# Patient Record
Sex: FEMALE | Race: WHITE | NOT HISPANIC OR LATINO | Employment: OTHER | URBAN - METROPOLITAN AREA
[De-identification: names, ages, dates, MRNs, and addresses within clinical notes are randomized per-mention and may not be internally consistent; named-entity substitution may affect disease eponyms.]

---

## 2017-02-01 RX ORDER — LEVOCETIRIZINE DIHYDROCHLORIDE 5 MG/1
TABLET, FILM COATED ORAL
Qty: 90 TAB | Refills: 3 | Status: SHIPPED | OUTPATIENT
Start: 2017-02-01 | End: 2017-11-14

## 2017-02-09 DIAGNOSIS — R60.9 EDEMA, UNSPECIFIED TYPE: ICD-10-CM

## 2017-02-09 RX ORDER — FUROSEMIDE 20 MG/1
10 TABLET ORAL DAILY
Qty: 45 TAB | Refills: 3 | Status: CANCELLED | OUTPATIENT
Start: 2017-02-09

## 2017-02-09 RX ORDER — FUROSEMIDE 20 MG/1
10 TABLET ORAL DAILY
Qty: 45 TAB | Refills: 3 | OUTPATIENT
Start: 2017-02-09 | End: 2018-04-02 | Stop reason: SDUPTHER

## 2017-04-11 ENCOUNTER — HOSPITAL ENCOUNTER (OUTPATIENT)
Dept: LAB | Facility: MEDICAL CENTER | Age: 77
End: 2017-04-11
Attending: NURSE PRACTITIONER
Payer: MEDICARE

## 2017-04-11 DIAGNOSIS — I10 ESSENTIAL HYPERTENSION: ICD-10-CM

## 2017-04-11 DIAGNOSIS — Z00.00 ROUTINE HEALTH MAINTENANCE: ICD-10-CM

## 2017-04-11 DIAGNOSIS — I25.119 CORONARY ARTERY DISEASE INVOLVING NATIVE CORONARY ARTERY OF NATIVE HEART WITH ANGINA PECTORIS (HCC): ICD-10-CM

## 2017-04-11 DIAGNOSIS — R79.89 ELEVATED TSH: ICD-10-CM

## 2017-04-11 LAB
25(OH)D3 SERPL-MCNC: 31 NG/ML (ref 30–100)
ALBUMIN SERPL BCP-MCNC: 4.3 G/DL (ref 3.2–4.9)
ALBUMIN/GLOB SERPL: 1.7 G/DL
ALP SERPL-CCNC: 51 U/L (ref 30–99)
ALT SERPL-CCNC: 16 U/L (ref 2–50)
ANION GAP SERPL CALC-SCNC: 6 MMOL/L (ref 0–11.9)
AST SERPL-CCNC: 20 U/L (ref 12–45)
BASOPHILS # BLD AUTO: 0.6 % (ref 0–1.8)
BASOPHILS # BLD: 0.05 K/UL (ref 0–0.12)
BILIRUB SERPL-MCNC: 0.5 MG/DL (ref 0.1–1.5)
BUN SERPL-MCNC: 28 MG/DL (ref 8–22)
CALCIUM SERPL-MCNC: 9.9 MG/DL (ref 8.5–10.5)
CHLORIDE SERPL-SCNC: 106 MMOL/L (ref 96–112)
CHOLEST SERPL-MCNC: 176 MG/DL (ref 100–199)
CO2 SERPL-SCNC: 27 MMOL/L (ref 20–33)
CREAT SERPL-MCNC: 1.18 MG/DL (ref 0.5–1.4)
EOSINOPHIL # BLD AUTO: 0.19 K/UL (ref 0–0.51)
EOSINOPHIL NFR BLD: 2.4 % (ref 0–6.9)
ERYTHROCYTE [DISTWIDTH] IN BLOOD BY AUTOMATED COUNT: 50.4 FL (ref 35.9–50)
GFR SERPL CREATININE-BSD FRML MDRD: 44 ML/MIN/1.73 M 2
GLOBULIN SER CALC-MCNC: 2.5 G/DL (ref 1.9–3.5)
GLUCOSE SERPL-MCNC: 104 MG/DL (ref 65–99)
HCT VFR BLD AUTO: 38.8 % (ref 37–47)
HDLC SERPL-MCNC: 79 MG/DL
HGB BLD-MCNC: 12.3 G/DL (ref 12–16)
IMM GRANULOCYTES # BLD AUTO: 0.03 K/UL (ref 0–0.11)
IMM GRANULOCYTES NFR BLD AUTO: 0.4 % (ref 0–0.9)
LDLC SERPL CALC-MCNC: 77 MG/DL
LYMPHOCYTES # BLD AUTO: 2.54 K/UL (ref 1–4.8)
LYMPHOCYTES NFR BLD: 31.9 % (ref 22–41)
MCH RBC QN AUTO: 29.1 PG (ref 27–33)
MCHC RBC AUTO-ENTMCNC: 31.7 G/DL (ref 33.6–35)
MCV RBC AUTO: 91.7 FL (ref 81.4–97.8)
MONOCYTES # BLD AUTO: 0.76 K/UL (ref 0–0.85)
MONOCYTES NFR BLD AUTO: 9.5 % (ref 0–13.4)
NEUTROPHILS # BLD AUTO: 4.4 K/UL (ref 2–7.15)
NEUTROPHILS NFR BLD: 55.2 % (ref 44–72)
NRBC # BLD AUTO: 0 K/UL
NRBC BLD AUTO-RTO: 0 /100 WBC
PLATELET # BLD AUTO: 190 K/UL (ref 164–446)
PMV BLD AUTO: 11.6 FL (ref 9–12.9)
POTASSIUM SERPL-SCNC: 5.1 MMOL/L (ref 3.6–5.5)
PROT SERPL-MCNC: 6.8 G/DL (ref 6–8.2)
RBC # BLD AUTO: 4.23 M/UL (ref 4.2–5.4)
SODIUM SERPL-SCNC: 139 MMOL/L (ref 135–145)
TRIGL SERPL-MCNC: 99 MG/DL (ref 0–149)
TSH SERPL DL<=0.005 MIU/L-ACNC: 3.34 UIU/ML (ref 0.3–3.7)
WBC # BLD AUTO: 8 K/UL (ref 4.8–10.8)

## 2017-04-11 PROCEDURE — 80053 COMPREHEN METABOLIC PANEL: CPT

## 2017-04-11 PROCEDURE — 84443 ASSAY THYROID STIM HORMONE: CPT

## 2017-04-11 PROCEDURE — 82306 VITAMIN D 25 HYDROXY: CPT | Mod: GZ

## 2017-04-11 PROCEDURE — 36415 COLL VENOUS BLD VENIPUNCTURE: CPT

## 2017-04-11 PROCEDURE — 80061 LIPID PANEL: CPT

## 2017-04-11 PROCEDURE — 85025 COMPLETE CBC W/AUTO DIFF WBC: CPT

## 2017-04-18 ENCOUNTER — OFFICE VISIT (OUTPATIENT)
Dept: MEDICAL GROUP | Facility: PHYSICIAN GROUP | Age: 77
End: 2017-04-18
Payer: MEDICARE

## 2017-04-18 VITALS
RESPIRATION RATE: 12 BRPM | HEART RATE: 47 BPM | TEMPERATURE: 96.1 F | WEIGHT: 160 LBS | BODY MASS INDEX: 31.41 KG/M2 | SYSTOLIC BLOOD PRESSURE: 156 MMHG | DIASTOLIC BLOOD PRESSURE: 70 MMHG | OXYGEN SATURATION: 94 % | HEIGHT: 60 IN

## 2017-04-18 DIAGNOSIS — Z91.81 AT RISK FOR FALLS: ICD-10-CM

## 2017-04-18 DIAGNOSIS — R00.1 BRADYCARDIA WITH 41-50 BEATS PER MINUTE: ICD-10-CM

## 2017-04-18 DIAGNOSIS — Z59.9 FINANCIAL DIFFICULTIES: ICD-10-CM

## 2017-04-18 DIAGNOSIS — E66.9 OBESITY (BMI 30.0-34.9): ICD-10-CM

## 2017-04-18 DIAGNOSIS — R79.89 ELEVATED TSH: ICD-10-CM

## 2017-04-18 DIAGNOSIS — E78.5 DYSLIPIDEMIA: ICD-10-CM

## 2017-04-18 DIAGNOSIS — R73.01 ELEVATED FASTING GLUCOSE: ICD-10-CM

## 2017-04-18 DIAGNOSIS — I10 ESSENTIAL HYPERTENSION: ICD-10-CM

## 2017-04-18 LAB
HBA1C MFR BLD: 6 % (ref ?–5.8)
INT CON NEG: ABNORMAL
INT CON POS: ABNORMAL

## 2017-04-18 PROCEDURE — 83036 HEMOGLOBIN GLYCOSYLATED A1C: CPT | Performed by: NURSE PRACTITIONER

## 2017-04-18 PROCEDURE — 4040F PNEUMOC VAC/ADMIN/RCVD: CPT | Performed by: NURSE PRACTITIONER

## 2017-04-18 PROCEDURE — 99214 OFFICE O/P EST MOD 30 MIN: CPT | Performed by: NURSE PRACTITIONER

## 2017-04-18 PROCEDURE — G8419 CALC BMI OUT NRM PARAM NOF/U: HCPCS | Performed by: NURSE PRACTITIONER

## 2017-04-18 PROCEDURE — 3288F FALL RISK ASSESSMENT DOCD: CPT | Mod: 8P | Performed by: NURSE PRACTITIONER

## 2017-04-18 PROCEDURE — 1036F TOBACCO NON-USER: CPT | Performed by: NURSE PRACTITIONER

## 2017-04-18 PROCEDURE — G8432 DEP SCR NOT DOC, RNG: HCPCS | Performed by: NURSE PRACTITIONER

## 2017-04-18 PROCEDURE — 0518F FALL PLAN OF CARE DOCD: CPT | Mod: 8P | Performed by: NURSE PRACTITIONER

## 2017-04-18 PROCEDURE — G8599 NO ASA/ANTIPLAT THER USE RNG: HCPCS | Performed by: NURSE PRACTITIONER

## 2017-04-18 PROCEDURE — 1100F PTFALLS ASSESS-DOCD GE2>/YR: CPT | Performed by: NURSE PRACTITIONER

## 2017-04-18 RX ORDER — LISINOPRIL 20 MG/1
20 TABLET ORAL
Refills: 3 | COMMUNITY
Start: 2017-03-08 | End: 2018-03-30

## 2017-04-18 SDOH — ECONOMIC STABILITY - INCOME SECURITY: PROBLEM RELATED TO HOUSING AND ECONOMIC CIRCUMSTANCES, UNSPECIFIED: Z59.9

## 2017-04-18 ASSESSMENT — PATIENT HEALTH QUESTIONNAIRE - PHQ9: CLINICAL INTERPRETATION OF PHQ2 SCORE: 0

## 2017-04-18 NOTE — ASSESSMENT & PLAN NOTE
Reports she has not been able to walk much this winter d/t feeling tired and the weather. She does not have a kitchen, therefore is difficult to have meals. I have contacted  for assistance already for nutrition help, but meals on wheels was not an option.

## 2017-04-18 NOTE — ASSESSMENT & PLAN NOTE
Chronic problem well controlled with high intensity atorvastatin 80 mg daily without myalgias.     Ref. Range 4/14/2016 10:54 4/11/2017 11:30   Cholesterol,Tot Latest Ref Range: 100-199 mg/dL 154 176   Triglycerides Latest Ref Range: 0-149 mg/dL 71 99   HDL Latest Ref Range: >=40 mg/dL 75 79   LDL Latest Ref Range: <100 mg/dL 65 77

## 2017-04-18 NOTE — MR AVS SNAPSHOT
Valorie Sierra   2017 1:00 PM   Office Visit   MRN: [id number]    Department:  [facility]   Dept Phone:  [phone]    Description:  Female : 1940   Provider:  HAFSA Najera           Reason for Visit     Follow-Up 6 months      Allergies as of 2017     Allergen Noted Reactions    Iodine 2014   Itching    Oxycodone-Acetaminophen 2014   Hives    Sulfa Drugs 2014   Vomiting      You were diagnosed with     Essential hypertension   [1186134]       Elevated fasting glucose   [506708]       Dyslipidemia   [201854]       Elevated TSH   [637318]       Risk for falls   [194465]       At risk for falls   [860452]       Obesity (BMI 30.0-34.9)   [833912]       Financial difficulties   [145214]         Vital Signs     Blood Pressure Pulse Temperature Respirations Height Weight    156/70 mmHg 47 35.6 °C (96.1 °F) 12 1.524 m (5') 72.576 kg (160 lb)    Body Mass Index Oxygen Saturation Smoking Status             31.25 kg/m2 94% Former Smoker         Basic Information     Date Of Birth Sex Race Ethnicity Preferred Language    1940 Female White Non- English      Your appointments     2017  1:00 PM   FOLLOW UP with Kyle Stanley M.D.   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   2014 - Present    Compression fracture of vertebrae (CMS-HCC) M48.50XA   2014 - Present    Coronary artery disease involving native coronary artery of native heart with angina pectoris (CMS-HCC) I25.119   2016 - Present    Edema R60.9   2016 -  Present    Obesity (BMI 30.0-34.9) E66.9   10/18/2016 - Present    Elevated TSH R94.6   10/18/2016 - Present    Osteopenia M85.80   11/1/2016 - Present    Risk for falls Z91.81   4/18/2017 - Present    At risk for falls Z91.81   4/18/2017 - Present    Financial difficulties Z59.8   4/18/2017 - Present      Health Maintenance        Date Due Completion Dates    BONE DENSITY 9/22/2018 9/22/2016 (Done)    Override on 9/22/2016: Done    IMM DTaP/Tdap/Td Vaccine (2 - Td) 8/23/2022 8/23/2012            Current Immunizations     13-VALENT PCV PREVNAR 9/9/2015    Influenza TIV (IM) 9/1/2013    Influenza Vaccine Quad Inj (Preserved) 9/3/2016    Pneumococcal polysaccharide vaccine (PPSV-23) 8/31/2005    SHINGLES VACCINE 8/23/2012    Tdap Vaccine 8/23/2012      Below and/or attached are the medications your provider expects you to take. Review all of your home medications and newly ordered medications with your provider and/or pharmacist. Follow medication instructions as directed by your provider and/or pharmacist. Please keep your medication list with you and share with your provider. Update the information when medications are discontinued, doses are changed, or new medications (including over-the-counter products) are added; and carry medication information at all times in the event of emergency situations     Allergies:  IODINE - Itching     OXYCODONE-ACETAMINOPHEN - Hives     SULFA DRUGS - Vomiting               Medications  Valid as of: April 18, 2017 -  1:38 PM    Generic Name Brand Name Tablet Size Instructions for use    Aspirin (Tablet Delayed Response) ECOTRIN 81 MG Take 1 Tab by mouth every day.        Atorvastatin Calcium (Tab) LIPITOR 80 MG Take 1 Tab by mouth every day. TAKE 1 TAB BY MOUTH EVERY DAY.        Budesonide-Formoterol Fumarate (Aerosol) SYMBICORT 80-4.5 MCG/ACT Inhale 2 Puffs by mouth 2 Times a Day.        Calcium Carbonate-Vit D-Min   Take  by mouth.        Cholecalciferol (Tab) cholecalciferol 1000  UNIT Take 1,000 Units by mouth every day.        Furosemide (Tab) LASIX 20 MG Take 0.5 Tabs by mouth every day.        Lactobacillus Rhamnosus (GG)   Take  by mouth every day.        Levocetirizine Dihydrochloride (Tab) Levocetirizine Dihydrochloride 5 MG TAKE 1 TAB BY MOUTH EVERY BEDTIME.        Lisinopril (Tab) PRINIVIL 20 MG 20 mg every day.        Metoprolol Tartrate (Tab) LOPRESSOR 25 MG Take 0.5 Tabs by mouth 2 Times a Day.        Potassium Chloride Luz CR (Tab CR) Kdur 20 MEQ Take 1 Tab by mouth every day.        .                 Medicines prescribed today were sent to:     [facility]Y #[id number] - [place], [place] - [address] [facility] AT [address] [place] [address]    Phone: [phone] Fax: [phone]    Open 24 Hours?: No      Medication refill instructions:       If your prescription bottle indicates you have medication refills left, it is not necessary to call your provider’s office. Please contact your pharmacy and they will refill your medication.    If your prescription bottle indicates you do not have any refills left, you may request refills at any time through one of the following ways: The online [employer] system (except Urgent Care), by calling your provider’s office, or by asking your pharmacy to contact your provider’s office with a refill request. Medication refills are processed only during regular business hours and may not be available until the next business day. Your provider may request additional information or to have a follow-up visit with you prior to refilling your medication.   *Please Note: Medication refills are assigned a new Rx number when refilled electronically. Your pharmacy may indicate that no refills were authorized even though a new prescription for the same medication is available at the pharmacy. Please request the medicine by name with the pharmacy before contacting your provider for a refill.        Other Notes About Your Plan     SIGNED  OA:   10/09/2014  LAST UDS: 11/11/2014             MyChart Status: Patient Declined

## 2017-04-18 NOTE — ASSESSMENT & PLAN NOTE
Established problem currently managed with lisinopril 20 mg and metoprolol 25 mg BID. Does monitor bp at home and states it is frequently in 150s. Lisinopril was recently increased. Denies any lightheadedness, headache, vision changes, or LE edema.

## 2017-04-18 NOTE — ASSESSMENT & PLAN NOTE
New problem. Denies any polyuria, polydipsia, polyphagia, blurred or cloudy vision, rash or thrush, or numbness or tingling of feet. Does not exercise regularly and does not have good diet intake because she relies on eating out and eating very cheap food   Ref. Range 4/18/2017 13:40   Glycohemoglobin Unknown 6.0

## 2017-04-18 NOTE — ASSESSMENT & PLAN NOTE
Established problem that has resolved.     Ref. Range 4/14/2016 10:54 4/11/2017 11:30   TSH Latest Ref Range: 0.300-3.700 uIU/mL 4.340 (H) 3.340

## 2017-04-19 ENCOUNTER — TELEPHONE (OUTPATIENT)
Dept: MEDICAL GROUP | Facility: PHYSICIAN GROUP | Age: 77
End: 2017-04-19

## 2017-04-19 NOTE — ASSESSMENT & PLAN NOTE
Patient has been having a lot of financial difficulties, especially now that her rent has been increased with her fixed income. She has hardly been in touch with the  to look for other option, but they were not really able to address anything that worked for the patient

## 2017-04-19 NOTE — TELEPHONE ENCOUNTER
Please call patient to ask her if [facility] is able to bring her to her May appointment with myself. If not, we need to discuss with Susan and/or aPrul about getting a taxi voucher as I really need patient to get back to clinic for follow up. Thank you.    LUCIE Najera.

## 2017-04-19 NOTE — TELEPHONE ENCOUNTER
----- Message from HAFSA Najera sent at 4/18/2017  1:26 PM PDT -----  Check if can get ride or if need taxi voucher

## 2017-04-19 NOTE — PROGRESS NOTES
Subjective:     Chief Complaint   Patient presents with   • Follow-Up     6 months       HPI  Valorie Sierra is a 76 y.o. female here today for routine follow up.     Dyslipidemia  Chronic problem well controlled with high intensity atorvastatin 80 mg daily without myalgias.     Ref. Range 4/14/2016 10:54 4/11/2017 11:30   Cholesterol,Tot Latest Ref Range: 100-199 mg/dL 154 176   Triglycerides Latest Ref Range: 0-149 mg/dL 71 99   HDL Latest Ref Range: >=40 mg/dL 75 79   LDL Latest Ref Range: <100 mg/dL 65 77       Elevated TSH  Established problem that has resolved.     Ref. Range 4/14/2016 10:54 4/11/2017 11:30   TSH Latest Ref Range: 0.300-3.700 uIU/mL 4.340 (H) 3.340       Essential hypertension  Established problem currently managed with lisinopril 20 mg and metoprolol 25 mg BID. Does monitor bp at home and states it is frequently in 150s. Lisinopril was recently increased. Denies any lightheadedness, headache, vision changes, or LE edema.     Obesity (BMI 30.0-34.9)  Reports she has not been able to walk much this winter d/t feeling tired and the weather. She does not have a kitchen, therefore is difficult to have meals. I have contacted  for assistance already for nutrition help, but meals on wheels was not an option.     Elevated fasting glucose  New problem. Denies any polyuria, polydipsia, polyphagia, blurred or cloudy vision, rash or thrush, or numbness or tingling of feet. Does not exercise regularly and does not have good diet intake because she relies on eating out and eating very cheap food   Ref. Range 4/18/2017 13:40   Glycohemoglobin Unknown 6.0       Risk for falls  Patient lives in a hotel. She does have a walker for assistive device. She has generalized weakness    Financial difficulties  Patient has been having a lot of financial difficulties, especially now that her rent has been increased with her fixed income. She has hardly been in touch with the  to look for  other option, but they were not really able to address anything that worked for the patient         Diagnoses of Essential hypertension, Bradycardia with 41-50 beats per minute, Dyslipidemia, Elevated fasting glucose, Elevated TSH, At risk for falls, Financial difficulties, and Obesity (BMI 30.0-34.9) were pertinent to this visit.    Allergies: Iodine; Oxycodone-acetaminophen; and Sulfa drugs  Current medicines (including changes today)  Current Outpatient Prescriptions   Medication Sig Dispense Refill   • lisinopril (PRINIVIL) 20 MG Tab 20 mg every day.  3   • metoprolol (LOPRESSOR) 25 MG Tab Take 0.5 Tabs by mouth 2 Times a Day. 60 Tab 11   • furosemide (LASIX) 20 MG Tab Take 0.5 Tabs by mouth every day. 45 Tab 3   • Levocetirizine Dihydrochloride 5 MG Tab TAKE 1 TAB BY MOUTH EVERY BEDTIME. 90 Tab 3   • atorvastatin (LIPITOR) 80 MG tablet Take 1 Tab by mouth every day. TAKE 1 TAB BY MOUTH EVERY DAY. 90 Tab 3   • vitamin D (CHOLECALCIFEROL) 1000 UNIT Tab Take 1,000 Units by mouth every day.     • Calcium Carbonate-Vit D-Min (CALCIUM 1200 PO) Take  by mouth.     • budesonide-formoterol (SYMBICORT) 80-4.5 MCG/ACT Aerosol Inhale 2 Puffs by mouth 2 Times a Day. 1 Inhaler 11   • potassium chloride SA (K-DUR) 20 MEQ Tab CR Take 1 Tab by mouth every day. 90 Tab 3   • Lactobacillus Rhamnosus, GG, (CULTURELLE PO) Take  by mouth every day.     • aspirin EC (ECOTRIN) 81 MG TBEC Take 1 Tab by mouth every day. 90 Tab 3     No current facility-administered medications for this visit.       She  has a past medical history of Anemia; Anxiety; Depression; Arrhythmia; Arthritis; Blood transfusion, without reported diagnosis; Unspecified cataract; CHF (congestive heart failure) (CMS-[facility]); Emphysema; Chronic airway obstruction, not elsewhere classified (CMS-[facility]); GERD (gastroesophageal reflux disease); Headache, classical migraine; Heart attack (CMS-[facility]); Heart murmur; Hyperlipidemia; Hypertension; IBD (inflammatory bowel disease);  Kidney disease; and Ulcer (CMS-HCC).    Health Maintenance: UTD    ROS  As stated in HPI and additionally  General: +fatigue. No fevers, chills, weight loss  CV: Denies any chest pain, palpitations,  THOMAS, syncope, pre-syncope, or LE edema.   Pulm: No sob or dyspnea       Objective:     Blood pressure 156/70, pulse 47, temperature 35.6 °C (96.1 °F), resp. rate 12, height 1.524 m (5'), weight 72.576 kg (160 lb), SpO2 94 %. Body mass index is 31.25 kg/(m^2).  Physical Exam:  General: Alert, oriented, in no acute distress.  Eye contact is good, speech goal directed, affect calm and pleasant   HEENT: conjunctiva non-injected, sclera non-icteric, EOMs intact.  Oral mucous membranes pink and moist with no lesions. Oropharynx without erythema, or exudate.   Neck: No adenopathy or masses in the cervical or supraclavicular regions.   Lungs: unlabored. clear to auscultation bilaterally with good excursion.  CV: bradycardic rate and rhythm. No murmurs  Ext: no edema, normal color and temperature.   Gait steady with walker.         Assessment and Plan:   Assessment/Plan:  1. Essential hypertension  bp slightly elevated today. We will first address bradycardia and re-eval blood pressure in 3 weeks as I suspect we will need to add additional medication   - metoprolol (LOPRESSOR) 25 MG Tab; Take 0.5 Tabs by mouth 2 Times a Day.  Dispense: 60 Tab; Refill: 11    2. Bradycardia with 41-50 beats per minute  Decrease metoprolol from 25 mg BID to 12.5 mg BID for 3 weeks then re-eval     3. Dyslipidemia  controlled    4. Elevated fasting glucose  New problem. Discussion on education will be deferred to next visit in 3 weeks  - POCT Hemoglobin A1C    5. Elevated TSH  resolved    6. At risk for falls  Patient declines physical therapy for strengthening stating she cannot afford co-pays   - Patient identified as fall risk.  Appropriate orders and counseling given.    7. Financial difficulties  Already brought case to attention of social  worker. Problem persists. Will assist patient with medications and taxi vouchers when possible  - Patient identified as having weight management issue.  Appropriate orders and counseling given.    8. Obesity BMI 30-39.9  Obesity counseling. Difficult d/t financial constraints and generalized weakness        Follow up:  Return in about 3 weeks (around 5/9/2017).    Educated in proper administration of medication(s) ordered today including safety, possible SE, risks, benefits, rationale and alternatives to therapy.       Please note that this dictation was created using voice recognition software. I have made every reasonable attempt to correct obvious errors, but I expect that there are errors of grammar and possibly content that I did not discover before finalizing the note.    Followup: Return in about 3 weeks (around 5/9/2017). sooner should new symptoms or problems arise.

## 2017-04-19 NOTE — ASSESSMENT & PLAN NOTE
Patient lives in a hotel. She does have a walker for assistive device. She has generalized weakness

## 2017-04-28 DIAGNOSIS — R60.9 EDEMA, UNSPECIFIED TYPE: ICD-10-CM

## 2017-04-28 RX ORDER — DILTIAZEM HYDROCHLORIDE 60 MG/1
TABLET, FILM COATED ORAL
Qty: 10.2 INHALER | Refills: 3 | Status: SHIPPED | OUTPATIENT
Start: 2017-04-28 | End: 2017-10-09 | Stop reason: SDUPTHER

## 2017-04-28 RX ORDER — POTASSIUM CHLORIDE 20 MEQ/1
20 TABLET, EXTENDED RELEASE ORAL DAILY
Qty: 90 TAB | Refills: 2 | Status: CANCELLED | OUTPATIENT
Start: 2017-04-28

## 2017-05-01 DIAGNOSIS — R60.1 GENERALIZED EDEMA: ICD-10-CM

## 2017-05-01 RX ORDER — POTASSIUM CHLORIDE 20 MEQ/1
20 TABLET, EXTENDED RELEASE ORAL DAILY
Qty: 90 TAB | Refills: 2 | OUTPATIENT
Start: 2017-05-01 | End: 2017-11-14

## 2017-05-09 ENCOUNTER — OFFICE VISIT (OUTPATIENT)
Dept: MEDICAL GROUP | Facility: PHYSICIAN GROUP | Age: 77
End: 2017-05-09
Payer: MEDICARE

## 2017-05-09 VITALS
HEIGHT: 60 IN | HEART RATE: 53 BPM | BODY MASS INDEX: 31.41 KG/M2 | DIASTOLIC BLOOD PRESSURE: 72 MMHG | SYSTOLIC BLOOD PRESSURE: 138 MMHG | TEMPERATURE: 97 F | OXYGEN SATURATION: 95 % | WEIGHT: 160 LBS | RESPIRATION RATE: 16 BRPM

## 2017-05-09 DIAGNOSIS — R00.1 BRADYCARDIA: ICD-10-CM

## 2017-05-09 DIAGNOSIS — R73.03 PREDIABETES: ICD-10-CM

## 2017-05-09 DIAGNOSIS — I10 ESSENTIAL HYPERTENSION: ICD-10-CM

## 2017-05-09 PROCEDURE — 99213 OFFICE O/P EST LOW 20 MIN: CPT | Performed by: NURSE PRACTITIONER

## 2017-05-09 PROCEDURE — 1100F PTFALLS ASSESS-DOCD GE2>/YR: CPT | Performed by: NURSE PRACTITIONER

## 2017-05-09 PROCEDURE — G8432 DEP SCR NOT DOC, RNG: HCPCS | Performed by: NURSE PRACTITIONER

## 2017-05-09 PROCEDURE — 1036F TOBACCO NON-USER: CPT | Performed by: NURSE PRACTITIONER

## 2017-05-09 PROCEDURE — 0518F FALL PLAN OF CARE DOCD: CPT | Mod: 8P | Performed by: NURSE PRACTITIONER

## 2017-05-09 PROCEDURE — G8419 CALC BMI OUT NRM PARAM NOF/U: HCPCS | Performed by: NURSE PRACTITIONER

## 2017-05-09 PROCEDURE — G8599 NO ASA/ANTIPLAT THER USE RNG: HCPCS | Performed by: NURSE PRACTITIONER

## 2017-05-09 PROCEDURE — 3288F FALL RISK ASSESSMENT DOCD: CPT | Mod: 8P | Performed by: NURSE PRACTITIONER

## 2017-05-09 PROCEDURE — 4040F PNEUMOC VAC/ADMIN/RCVD: CPT | Performed by: NURSE PRACTITIONER

## 2017-05-09 NOTE — MR AVS SNAPSHOT
Valorie Patricialamont Sierra   2017 2:00 PM   Office Visit   MRN: [id number]    Department:  [facility]   Dept Phone:  [phone]    Description:  Female : 1940   Provider:  HAFSA Najera           Reason for Visit     Follow-Up 3 weeks      Allergies as of 2017     Allergen Noted Reactions    Iodine 2014   Itching    Oxycodone-Acetaminophen 2014   Hives    Sulfa Drugs 2014   Vomiting      You were diagnosed with     Essential hypertension   [4039553]       Bradycardia   [660572]       Prediabetes   [763116]         Vital Signs     Blood Pressure Pulse Temperature Respirations Height Weight    138/72 mmHg 53 36.1 °C (97 °F) 16 1.524 m (5') 72.576 kg (160 lb)    Body Mass Index Oxygen Saturation Smoking Status             31.25 kg/m2 95% Former Smoker         Basic Information     Date Of Birth Sex Race Ethnicity Preferred Language    1940 Female White Non- English      Your appointments     2017  1:00 PM   FOLLOW UP with Kyle Stanley M.D.   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   2014 - Present    Compression fracture of vertebrae (CMS-HCC) M48.50XA   2014 - Present    Coronary artery disease involving native coronary artery of native heart with angina pectoris (CMS-HCC) I25.119   2016 - Present    Edema R60.9   2016 - Present    Obesity (BMI 30.0-34.9) E66.9   10/18/2016 - Present    Elevated TSH R94.6   10/18/2016 - Present    Osteopenia M85.80   2016 - Present    Risk for falls Z91.81   2017 -  Present    Financial difficulties Z59.8   4/18/2017 - Present    Prediabetes R73.03   4/18/2017 - Present    Bradycardia R00.1   5/9/2017 - Present      Health Maintenance        Date Due Completion Dates    BONE DENSITY 9/22/2018 9/22/2016 (Done)    Override on 9/22/2016: Done    IMM DTaP/Tdap/Td Vaccine (2 - Td) 8/23/2022 8/23/2012            Current Immunizations     13-VALENT PCV PREVNAR 9/9/2015    Influenza TIV (IM) 9/1/2013    Influenza Vaccine Quad Inj (Preserved) 9/3/2016    Pneumococcal polysaccharide vaccine (PPSV-23) 8/31/2005    SHINGLES VACCINE 8/23/2012    Tdap Vaccine 8/23/2012      Below and/or attached are the medications your provider expects you to take. Review all of your home medications and newly ordered medications with your provider and/or pharmacist. Follow medication instructions as directed by your provider and/or pharmacist. Please keep your medication list with you and share with your provider. Update the information when medications are discontinued, doses are changed, or new medications (including over-the-counter products) are added; and carry medication information at all times in the event of emergency situations     Allergies:  IODINE - Itching     OXYCODONE-ACETAMINOPHEN - Hives     SULFA DRUGS - Vomiting               Medications  Valid as of: May 09, 2017 -  2:31 PM    Generic Name Brand Name Tablet Size Instructions for use    Aspirin (Tablet Delayed Response) ECOTRIN 81 MG Take 1 Tab by mouth every day.        Atorvastatin Calcium (Tab) LIPITOR 80 MG Take 1 Tab by mouth every day. TAKE 1 TAB BY MOUTH EVERY DAY.        Budesonide-Formoterol Fumarate (Aerosol) SYMBICORT 80-4.5 MCG/ACT INHALE 2 PUFFS BY MOUTH 2 TIMES A DAY.        Calcium Carbonate-Vit D-Min   Take  by mouth.        Cholecalciferol (Tab) cholecalciferol 1000 UNIT Take 1,000 Units by mouth every day.        Furosemide (Tab) LASIX 20 MG Take 0.5 Tabs by mouth every day.        Lactobacillus Rhamnosus (GG)   Take   by mouth every day.        Levocetirizine Dihydrochloride (Tab) Levocetirizine Dihydrochloride 5 MG TAKE 1 TAB BY MOUTH EVERY BEDTIME.        Lisinopril (Tab) PRINIVIL 20 MG 20 mg every day.        Metoprolol Tartrate (Tab) LOPRESSOR 25 MG Take 0.5 Tabs by mouth 2 Times a Day.        Potassium Chloride Luz CR (Tab CR) Kdur 20 MEQ Take 1 Tab by mouth every day.        .                 Medicines prescribed today were sent to:     [facility]Y #[id number] - [place], [place] - [address] AT [address] [place] [address]    Phone: [phone] Fax: [phone]    Open 24 Hours?: No      Medication refill instructions:       If your prescription bottle indicates you have medication refills left, it is not necessary to call your provider’s office. Please contact your pharmacy and they will refill your medication.    If your prescription bottle indicates you do not have any refills left, you may request refills at any time through one of the following ways: The online [employer] system (except Urgent Care), by calling your provider’s office, or by asking your pharmacy to contact your provider’s office with a refill request. Medication refills are processed only during regular business hours and may not be available until the next business day. Your provider may request additional information or to have a follow-up visit with you prior to refilling your medication.   *Please Note: Medication refills are assigned a new Rx number when refilled electronically. Your pharmacy may indicate that no refills were authorized even though a new prescription for the same medication is available at the pharmacy. Please request the medicine by name with the pharmacy before contacting your provider for a refill.        Your To Do List     Future Labs/Procedures Complete By Expires    COMP METABOLIC PANEL  As directed 5/10/2018    HEMOGLOBIN A1C  As directed 5/10/2018      Other Notes About Your Plan     SIGNED  OA:   10/09/2014  LAST UDS: 11/11/2014             MyChart Status: Patient Declined

## 2017-05-09 NOTE — ASSESSMENT & PLAN NOTE
She was decreased from metoprolol 25 mg BID to now 12.5 mg BID d/t HR 47 last visit. She is doing well with this. HR is stable. Denies any dizziness, lightheadedness, or excessive fatigue.

## 2017-05-10 NOTE — ASSESSMENT & PLAN NOTE
New problem with April 2017 A1C of 6.0. Has poor diet d/t eating out frequently with current situation. Does not exercise. Denies any polyuria, polydipsia, polyphagia.

## 2017-05-10 NOTE — PROGRESS NOTES
Subjective:     Chief Complaint   Patient presents with   • Follow-Up     3 weeks       HPI  Valorie Sierra is a 76 y.o. female here today for 3 week f/u after med changes     Bradycardia  She was decreased from metoprolol 25 mg BID to now 12.5 mg BID d/t HR 47 last visit. She is doing well with this. HR is stable. Denies any dizziness, lightheadedness, or excessive fatigue.     Prediabetes  New problem with April 2017 A1C of 6.0. Has poor diet d/t eating out frequently with current situation. Does not exercise. Denies any polyuria, polydipsia, polyphagia.     Essential hypertension  3 weeks ago metoprolol was decreased. bp remains stable with lisinopril 20 mg daily.        Diagnoses of Essential hypertension, Bradycardia, and Prediabetes were pertinent to this visit.    Allergies: Iodine; Oxycodone-acetaminophen; and Sulfa drugs  Current medicines (including changes today)  Current Outpatient Prescriptions   Medication Sig Dispense Refill   • potassium chloride SA (KDUR) 20 MEQ Tab CR Take 1 Tab by mouth every day. 90 Tab 2   • SYMBICORT 80-4.5 MCG/ACT Aerosol INHALE 2 PUFFS BY MOUTH 2 TIMES A DAY. 10.2 Inhaler 3   • lisinopril (PRINIVIL) 20 MG Tab 20 mg every day.  3   • metoprolol (LOPRESSOR) 25 MG Tab Take 0.5 Tabs by mouth 2 Times a Day. 60 Tab 11   • furosemide (LASIX) 20 MG Tab Take 0.5 Tabs by mouth every day. 45 Tab 3   • Levocetirizine Dihydrochloride 5 MG Tab TAKE 1 TAB BY MOUTH EVERY BEDTIME. 90 Tab 3   • atorvastatin (LIPITOR) 80 MG tablet Take 1 Tab by mouth every day. TAKE 1 TAB BY MOUTH EVERY DAY. 90 Tab 3   • vitamin D (CHOLECALCIFEROL) 1000 UNIT Tab Take 1,000 Units by mouth every day.     • Calcium Carbonate-Vit D-Min (CALCIUM 1200 PO) Take  by mouth.     • Lactobacillus Rhamnosus, GG, (CULTURELLE PO) Take  by mouth every day.     • aspirin EC (ECOTRIN) 81 MG TBEC Take 1 Tab by mouth every day. 90 Tab 3     No current facility-administered medications for this visit.       She  has a past  medical history of Anemia; Anxiety; Depression; Arrhythmia; Arthritis; Blood transfusion, without reported diagnosis; Unspecified cataract; CHF (congestive heart failure) (CMS-[facility]); Emphysema; Chronic airway obstruction, not elsewhere classified; GERD (gastroesophageal reflux disease); Headache, classical migraine; Heart attack (CMS-[facility]); Heart murmur; Hyperlipidemia; Hypertension; IBD (inflammatory bowel disease); Kidney disease; and Ulcer (CMS-HCC).    Health Maintenance: UTD    ROS  As stated in HPI      Objective:     Blood pressure 138/72, pulse 53, temperature 36.1 °C (97 °F), resp. rate 16, height 1.524 m (5'), weight 72.576 kg (160 lb), SpO2 95 %. Body mass index is 31.25 kg/(m^2).  Physical Exam:  General: Alert, oriented, in no acute distress.  Eye contact is good, speech goal directed, affect calm  HEENT: conjunctiva non-injected, sclera non-icteric, EOMs intact. Gross hearing intact.  CV: sindhu rate and rhythm. No murmurs.   Ext: no edema  Gait steady.     Assessment and Plan:   Assessment/Plan:  1. Essential hypertension  Controlled on current medications.  - COMP METABOLIC PANEL; Future    2. Bradycardia  Improved. Continue metoprolol 12.5 mg BID    3. Prediabetes  Discussed diet/exercise changes. Monitor in 6 months.   - HEMOGLOBIN A1C; Future    The total time spent seeing the patient in consultation, and formulating an action plan for this visit was 15 minutes.  Greater than 50% of this time was spent face to face counseling, discussing problems documented above, coordinating care and answering questions by the provider.        Follow up:  Return in about 6 months (around 11/9/2017).    Educated in proper administration of medication(s) ordered today including safety, possible SE, risks, benefits, rationale and alternatives to therapy.   Supportive care, differential diagnoses, and indications for immediate follow-up discussed with patient.    Pathogenesis of diagnosis discussed including typical  length and natural progression.    Instructed to return to clinic or nearest emergency department for any change in condition, further concerns, or worsening of symptoms.  Patient states understanding of the plan of care and discharge instructions.      Please note that this dictation was created using voice recognition software. I have made every reasonable attempt to correct obvious errors, but I expect that there are errors of grammar and possibly content that I did not discover before finalizing the note.    Followup: Return in about 6 months (around 11/9/2017). sooner should new symptoms or problems arise.

## 2017-07-06 ENCOUNTER — OFFICE VISIT (OUTPATIENT)
Dept: CARDIOLOGY | Facility: MEDICAL CENTER | Age: 77
End: 2017-07-06
Payer: MEDICARE

## 2017-07-06 VITALS
SYSTOLIC BLOOD PRESSURE: 140 MMHG | WEIGHT: 157 LBS | HEART RATE: 54 BPM | DIASTOLIC BLOOD PRESSURE: 80 MMHG | OXYGEN SATURATION: 96 % | BODY MASS INDEX: 30.82 KG/M2 | HEIGHT: 60 IN

## 2017-07-06 DIAGNOSIS — I10 ESSENTIAL HYPERTENSION: ICD-10-CM

## 2017-07-06 DIAGNOSIS — Z95.1 S/P CABG X 5: ICD-10-CM

## 2017-07-06 DIAGNOSIS — E78.5 DYSLIPIDEMIA: ICD-10-CM

## 2017-07-06 PROCEDURE — 99214 OFFICE O/P EST MOD 30 MIN: CPT | Performed by: INTERNAL MEDICINE

## 2017-07-06 ASSESSMENT — ENCOUNTER SYMPTOMS
PALPITATIONS: 0
FEVER: 0
VOMITING: 0
BACK PAIN: 1
HEARTBURN: 0
BRUISES/BLEEDS EASILY: 0
SHORTNESS OF BREATH: 0
NEUROLOGICAL NEGATIVE: 1
NAUSEA: 0
WEIGHT LOSS: 0
NERVOUS/ANXIOUS: 1

## 2017-07-06 NOTE — PROGRESS NOTES
Subjective:   Valorie Sierra is a 76 y.o. female who presents today for follow-up of coronary artery disease and CABG. She underwent bypass surgery ×5 vessels in April, 2015. She's had no angina. Most recent lab reviewed with her LDL is at target. Potassium is high at 5.1. She has a difficult living situation. She recently staining in a weekly renting motel as they lost her apartment. She cannot afford to get into an apartment right now and so has limited ability to cook for herself.  She uses a cane for ambulation because of balance and back problems.  Her primary care provider recently reduced her metoprolol because of relative bradycardia.  Past Medical History   Diagnosis Date   • Anemia    • Anxiety    • Depression    • Arrhythmia    • Arthritis    • Blood transfusion, without reported diagnosis    • Unspecified cataract    • CHF (congestive heart failure) (CMS-HCC)    • Emphysema    • Chronic airway obstruction, not elsewhere classified    • GERD (gastroesophageal reflux disease)    • Headache, classical migraine    • Heart attack (CMS-HCC)    • Heart murmur    • Hyperlipidemia    • Hypertension    • IBD (inflammatory bowel disease)    • Kidney disease    • Ulcer (CMS-HCC)      Past Surgical History   Procedure Laterality Date   • Multiple coronary artery bypass endo vein harvest  9/1/2014     Performed by Stephen Nowak M.D. at SURGERY [facility] ORS   • Abdominal hysterectomy total       fibroids   • Bladder neck contracture exicision  1970   • Ganglion excision  1969     left wrist   • Abdominal exploration     • Appendectomy     • Dental extraction(s)       Family History   Problem Relation Age of Onset   • Stroke Mother    • Hyperlipidemia Mother    • Hypertension Mother    • Lung Disease Mother      smoker   • Heart Disease Father    • Hypertension Father    • Hyperlipidemia Father    • Lung Disease Father      smoker   • Alcohol/Drug Father      etoh   • Other Father      aneurysm   • Genetic  Maternal Aunt      Parkinsons   • Heart Disease Paternal Aunt    • Hypertension Paternal Aunt    • Hyperlipidemia Paternal Aunt    • Psychiatry Paternal Aunt      dementia   • Lung Disease Paternal Aunt      smoker   • Heart Disease Paternal Uncle    • Hypertension Paternal Uncle    • Hyperlipidemia Paternal Uncle    • Arthritis Maternal Grandmother      osteoporosis   • Lung Disease Maternal Grandmother      smoker   • Heart Disease Paternal Grandmother    • Hypertension Paternal Grandmother    • Hyperlipidemia Paternal Grandmother    • Stroke Paternal Grandmother      dvt     History   Smoking status   • Former Smoker -- 3.00 packs/day for 55 years   • Types: Cigarettes   • Quit date: 08/29/2007   Smokeless tobacco   • Never Used     Allergies   Allergen Reactions   • Iodine Itching   • Oxycodone-Acetaminophen Hives   • Sulfa Drugs Vomiting     Outpatient Encounter Prescriptions as of 7/6/2017   Medication Sig Dispense Refill   • potassium chloride SA (KDUR) 20 MEQ Tab CR Take 1 Tab by mouth every day. 90 Tab 2   • SYMBICORT 80-4.5 MCG/ACT Aerosol INHALE 2 PUFFS BY MOUTH 2 TIMES A DAY. 10.2 Inhaler 3   • lisinopril (PRINIVIL) 20 MG Tab 20 mg every day.  3   • metoprolol (LOPRESSOR) 25 MG Tab Take 0.5 Tabs by mouth 2 Times a Day. 60 Tab 11   • furosemide (LASIX) 20 MG Tab Take 0.5 Tabs by mouth every day. 45 Tab 3   • Levocetirizine Dihydrochloride 5 MG Tab TAKE 1 TAB BY MOUTH EVERY BEDTIME. 90 Tab 3   • atorvastatin (LIPITOR) 80 MG tablet Take 1 Tab by mouth every day. TAKE 1 TAB BY MOUTH EVERY DAY. 90 Tab 3   • vitamin D (CHOLECALCIFEROL) 1000 UNIT Tab Take 1,000 Units by mouth every day.     • Calcium Carbonate-Vit D-Min (CALCIUM 1200 PO) Take  by mouth.     • Lactobacillus Rhamnosus, GG, (CULTURELLE PO) Take  by mouth every day.     • aspirin EC (ECOTRIN) 81 MG TBEC Take 1 Tab by mouth every day. 90 Tab 3     No facility-administered encounter medications on file as of 7/6/2017.     Review of Systems    Constitutional: Positive for malaise/fatigue. Negative for fever and weight loss.   HENT: Negative for hearing loss.    Respiratory: Negative for shortness of breath.    Cardiovascular: Negative for chest pain and palpitations.   Gastrointestinal: Negative for heartburn, nausea and vomiting.   Musculoskeletal: Positive for back pain.   Skin: Negative for rash.   Neurological: Negative.    Endo/Heme/Allergies: Does not bruise/bleed easily.   Psychiatric/Behavioral: The patient is nervous/anxious.         Objective:   /80 mmHg  Pulse 54  Ht 1.524 m (5')  Wt 71.215 kg (157 lb)  BMI 30.66 kg/m2  SpO2 96%    Physical Exam   Constitutional: She is oriented to person, place, and time. She appears well-developed and well-nourished. No distress.   HENT:   Head: Atraumatic.   Eyes: Conjunctivae and EOM are normal. Pupils are equal, round, and reactive to light.   Neck: Neck supple. No JVD present.   Cardiovascular: Normal rate and regular rhythm.    No murmur heard.  Pulmonary/Chest: Effort normal and breath sounds normal. No respiratory distress. She has no wheezes. She has no rales.   Abdominal: Soft. There is no tenderness.   Musculoskeletal: She exhibits no edema.   Mild kyphosis   Neurological: She is alert and oriented to person, place, and time.   Skin: Skin is warm and dry. She is not diaphoretic.       Assessment:     1. S/P CABG x 5     2. Essential hypertension     3. Dyslipidemia         Medical Decision Making:  Today's Assessment / Status / Plan:     The patient is doing well at little over 3 years post CABG. No angina. LDL is at target. Blood pressure is controlled. At this time would recommend she stop potassium altogether as her potassium is 5.1 patient is also given written instructions to reduce her furosemide to Monday, Wednesday, and Friday only. Her heart rate is 60 bpm on the lower dose of metoprolol. Continue current medications. Return 6 months.

## 2017-07-06 NOTE — Clinical Note
[facility]   [address], [address]  [place] [address]  Phone: [phone]  Fax: [phone]              Valorie Sierra  1940    Encounter Date: 7/6/2017    Kyle Stanley M.D.          PROGRESS NOTE:  Subjective:   Valorie Sierra is a 76 y.o. female who presents today for follow-up of coronary artery disease and CABG. She underwent bypass surgery ×5 vessels in April, 2015. She's had no angina. Most recent lab reviewed with her LDL is at target. Potassium is high at 5.1. She has a difficult living situation. She recently staining in a weekly renting motel as they lost her apartment. She cannot afford to get into an apartment right now and so has limited ability to cook for herself.  She uses a cane for ambulation because of balance and back problems.  Her primary care provider recently reduced her metoprolol because of relative bradycardia.  Past Medical History   Diagnosis Date   • Anemia    • Anxiety    • Depression    • Arrhythmia    • Arthritis    • Blood transfusion, without reported diagnosis    • Unspecified cataract    • CHF (congestive heart failure) (CMS-[facility])    • Emphysema    • Chronic airway obstruction, not elsewhere classified    • GERD (gastroesophageal reflux disease)    • Headache, classical migraine    • Heart attack (CMS-[facility])    • Heart murmur    • Hyperlipidemia    • Hypertension    • IBD (inflammatory bowel disease)    • Kidney disease    • Ulcer (CMS-[facility])      Past Surgical History   Procedure Laterality Date   • Multiple coronary artery bypass endo vein harvest  9/1/2014     Performed by Stephen Nowak M.D. at SURGERY [facility] ORS   • Abdominal hysterectomy total       fibroids   • Bladder neck contracture exicision  1970   • Ganglion excision  1969     left wrist   • Abdominal exploration     • Appendectomy     • Dental extraction(s)       Family History   Problem Relation Age of Onset   • Stroke Mother    • Hyperlipidemia  Mother    • Hypertension Mother    • Lung Disease Mother      smoker   • Heart Disease Father    • Hypertension Father    • Hyperlipidemia Father    • Lung Disease Father      smoker   • Alcohol/Drug Father      etoh   • Other Father      aneurysm   • Genetic Maternal Aunt      Parkinsons   • Heart Disease Paternal Aunt    • Hypertension Paternal Aunt    • Hyperlipidemia Paternal Aunt    • Psychiatry Paternal Aunt      dementia   • Lung Disease Paternal Aunt      smoker   • Heart Disease Paternal Uncle    • Hypertension Paternal Uncle    • Hyperlipidemia Paternal Uncle    • Arthritis Maternal Grandmother      osteoporosis   • Lung Disease Maternal Grandmother      smoker   • Heart Disease Paternal Grandmother    • Hypertension Paternal Grandmother    • Hyperlipidemia Paternal Grandmother    • Stroke Paternal Grandmother      dvt     History   Smoking status   • Former Smoker -- 3.00 packs/day for 55 years   • Types: Cigarettes   • Quit date: 08/29/2007   Smokeless tobacco   • Never Used     Allergies   Allergen Reactions   • Iodine Itching   • Oxycodone-Acetaminophen Hives   • Sulfa Drugs Vomiting     Outpatient Encounter Prescriptions as of 7/6/2017   Medication Sig Dispense Refill   • potassium chloride SA (KDUR) 20 MEQ Tab CR Take 1 Tab by mouth every day. 90 Tab 2   • SYMBICORT 80-4.5 MCG/ACT Aerosol INHALE 2 PUFFS BY MOUTH 2 TIMES A DAY. 10.2 Inhaler 3   • lisinopril (PRINIVIL) 20 MG Tab 20 mg every day.  3   • metoprolol (LOPRESSOR) 25 MG Tab Take 0.5 Tabs by mouth 2 Times a Day. 60 Tab 11   • furosemide (LASIX) 20 MG Tab Take 0.5 Tabs by mouth every day. 45 Tab 3   • Levocetirizine Dihydrochloride 5 MG Tab TAKE 1 TAB BY MOUTH EVERY BEDTIME. 90 Tab 3   • atorvastatin (LIPITOR) 80 MG tablet Take 1 Tab by mouth every day. TAKE 1 TAB BY MOUTH EVERY DAY. 90 Tab 3   • vitamin D (CHOLECALCIFEROL) 1000 UNIT Tab Take 1,000 Units by mouth every day.     • Calcium Carbonate-Vit D-Min (CALCIUM 1200 PO) Take  by mouth.       • Lactobacillus Rhamnosus, GG, (CULTURELLE PO) Take  by mouth every day.     • aspirin EC (ECOTRIN) 81 MG TBEC Take 1 Tab by mouth every day. 90 Tab 3     No facility-administered encounter medications on file as of 7/6/2017.     Review of Systems   Constitutional: Positive for malaise/fatigue. Negative for fever and weight loss.   HENT: Negative for hearing loss.    Respiratory: Negative for shortness of breath.    Cardiovascular: Negative for chest pain and palpitations.   Gastrointestinal: Negative for heartburn, nausea and vomiting.   Musculoskeletal: Positive for back pain.   Skin: Negative for rash.   Neurological: Negative.    Endo/Heme/Allergies: Does not bruise/bleed easily.   Psychiatric/Behavioral: The patient is nervous/anxious.         Objective:   /80 mmHg  Pulse 54  Ht 1.524 m (5')  Wt 71.215 kg (157 lb)  BMI 30.66 kg/m2  SpO2 96%    Physical Exam   Constitutional: She is oriented to person, place, and time. She appears well-developed and well-nourished. No distress.   HENT:   Head: Atraumatic.   Eyes: Conjunctivae and EOM are normal. Pupils are equal, round, and reactive to light.   Neck: Neck supple. No JVD present.   Cardiovascular: Normal rate and regular rhythm.    No murmur heard.  Pulmonary/Chest: Effort normal and breath sounds normal. No respiratory distress. She has no wheezes. She has no rales.   Abdominal: Soft. There is no tenderness.   Musculoskeletal: She exhibits no edema.   Mild kyphosis   Neurological: She is alert and oriented to person, place, and time.   Skin: Skin is warm and dry. She is not diaphoretic.       Assessment:     1. S/P CABG x 5     2. Essential hypertension     3. Dyslipidemia         Medical Decision Making:  Today's Assessment / Status / Plan:     The patient is doing well at little over 3 years post CABG. No angina. LDL is at target. Blood pressure is controlled. At this time would recommend she stop potassium altogether as her potassium is 5.1  patient is also given written instructions to reduce her furosemide to Monday, Wednesday, and Friday only. Her heart rate is 60 bpm on the lower dose of metoprolol. Continue current medications. Return 6 months.      HAFSA Najera  [address] [address]  VIA In Basket

## 2017-07-06 NOTE — MR AVS SNAPSHOT
Valorie Sierra   2017 1:00 PM   Office Visit   MRN: [id number]    Department:  [facility]   Dept Phone:  [phone]    Description:  Female : 1940   Provider:  Kyle Stanley M.D.           Reason for Visit     Follow-Up           Allergies as of 2017     Allergen Noted Reactions    Iodine 2014   Itching    Oxycodone-Acetaminophen 2014   Hives    Sulfa Drugs 2014   Vomiting      Vital Signs     Blood Pressure Pulse Height Weight Body Mass Index Oxygen Saturation    140/80 mmHg 54 1.524 m (5') 71.215 kg (157 lb) 30.66 kg/m2 96%    Smoking Status                   Former Smoker           Basic Information     Date Of Birth Sex Race Ethnicity Preferred Language    1940 Female White Non- English      Your appointments     2017 12:00 PM   Established Patient with HAFSA Najera   [facility]    [address] [place] [id number] [phone]           You will be receiving a confirmation call a few days before your appointment from our automated call confirmation system.            2018  1:20 PM   FOLLOW UP with Kyle Stanley M.D.   [facility] (--)    [address], [facility] [address]  [facility] [address] [phone]              Problem List              ICD-10-CM Priority Class Noted - Resolved    Diastolic CHF (CMS-HCC) I50.30   9/10/2014 - Present    Dyslipidemia E78.5   2014 - Present    COPD (chronic obstructive pulmonary disease) (CMS-HCC) J44.9   2014 - Present    Essential hypertension I10   2014 - Present    S/P CABG x 5 Z95.1   2014 - Present    Compression fracture of vertebrae (CMS-HCC) M48.50XA   2014 - Present    Coronary artery disease involving native coronary artery of native heart with angina pectoris (CMS-HCC) I25.119   2016 - Present    Edema R60.9   2016 - Present    Obesity (BMI 30.0-34.9) E66.9   10/18/2016 -  Present    Elevated TSH R94.6   10/18/2016 - Present    Osteopenia M85.80   11/1/2016 - Present    Risk for falls Z91.81   4/18/2017 - Present    Financial difficulties Z59.8   4/18/2017 - Present    Prediabetes R73.03   4/18/2017 - Present    Bradycardia R00.1   5/9/2017 - Present      Health Maintenance        Date Due Completion Dates    IMM INFLUENZA (1) 9/1/2017 9/3/2016, 9/1/2013    BONE DENSITY 9/22/2018 9/22/2016 (Done)    Override on 9/22/2016: Done    IMM DTaP/Tdap/Td Vaccine (2 - Td) 8/23/2022 8/23/2012            Current Immunizations     13-VALENT PCV PREVNAR 9/9/2015    Influenza TIV (IM) 9/1/2013    Influenza Vaccine Quad Inj (Preserved) 9/3/2016    Pneumococcal polysaccharide vaccine (PPSV-23) 8/31/2005    SHINGLES VACCINE 8/23/2012    Tdap Vaccine 8/23/2012      Below and/or attached are the medications your provider expects you to take. Review all of your home medications and newly ordered medications with your provider and/or pharmacist. Follow medication instructions as directed by your provider and/or pharmacist. Please keep your medication list with you and share with your provider. Update the information when medications are discontinued, doses are changed, or new medications (including over-the-counter products) are added; and carry medication information at all times in the event of emergency situations     Allergies:  IODINE - Itching     OXYCODONE-ACETAMINOPHEN - Hives     SULFA DRUGS - Vomiting               Medications  Valid as of: July 06, 2017 -  1:09 PM    Generic Name Brand Name Tablet Size Instructions for use    Aspirin (Tablet Delayed Response) ECOTRIN 81 MG Take 1 Tab by mouth every day.        Atorvastatin Calcium (Tab) LIPITOR 80 MG Take 1 Tab by mouth every day. TAKE 1 TAB BY MOUTH EVERY DAY.        Budesonide-Formoterol Fumarate (Aerosol) SYMBICORT 80-4.5 MCG/ACT INHALE 2 PUFFS BY MOUTH 2 TIMES A DAY.        Calcium Carbonate-Vit D-Min   Take  by mouth.         Cholecalciferol (Tab) cholecalciferol 1000 UNIT Take 1,000 Units by mouth every day.        Furosemide (Tab) LASIX 20 MG Take 0.5 Tabs by mouth every day.        Lactobacillus Rhamnosus (GG)   Take  by mouth every day.        Levocetirizine Dihydrochloride (Tab) Levocetirizine Dihydrochloride 5 MG TAKE 1 TAB BY MOUTH EVERY BEDTIME.        Lisinopril (Tab) PRINIVIL 20 MG 20 mg every day.        Metoprolol Tartrate (Tab) LOPRESSOR 25 MG Take 0.5 Tabs by mouth 2 Times a Day.        Potassium Chloride Luz CR (Tab CR) Kdur 20 MEQ Take 1 Tab by mouth every day.        .                 Medicines prescribed today were sent to:     [id number] - [place], [place] - [address] AT [address] [place] [address]    Phone: [phone] Fax: [phone]    Open 24 Hours?: No      Medication refill instructions:       If your prescription bottle indicates you have medication refills left, it is not necessary to call your provider’s office. Please contact your pharmacy and they will refill your medication.    If your prescription bottle indicates you do not have any refills left, you may request refills at any time through one of the following ways: The online [employer] system (except Urgent Care), by calling your provider’s office, or by asking your pharmacy to contact your provider’s office with a refill request. Medication refills are processed only during regular business hours and may not be available until the next business day. Your provider may request additional information or to have a follow-up visit with you prior to refilling your medication.   *Please Note: Medication refills are assigned a new Rx number when refilled electronically. Your pharmacy may indicate that no refills were authorized even though a new prescription for the same medication is available at the pharmacy. Please request the medicine by name with the pharmacy before contacting your provider for a refill.           Other Notes About Your Plan     SIGNED OA:   10/09/2014  LAST UDS: 11/11/2014             MyChart Status: Patient Declined

## 2017-10-09 RX ORDER — DILTIAZEM HYDROCHLORIDE 60 MG/1
TABLET, FILM COATED ORAL
Qty: 1 INHALER | Refills: 3 | Status: SHIPPED | OUTPATIENT
Start: 2017-10-09 | End: 2017-10-16 | Stop reason: SDUPTHER

## 2017-10-16 RX ORDER — BUDESONIDE AND FORMOTEROL FUMARATE DIHYDRATE 80; 4.5 UG/1; UG/1
AEROSOL RESPIRATORY (INHALATION)
Qty: 10.2 INHALER | Refills: 3 | Status: SHIPPED | OUTPATIENT
Start: 2017-10-16 | End: 2018-03-30

## 2017-10-30 DIAGNOSIS — E78.5 DYSLIPIDEMIA: ICD-10-CM

## 2017-10-31 RX ORDER — ATORVASTATIN CALCIUM 80 MG/1
TABLET, FILM COATED ORAL
Qty: 90 TAB | Refills: 3 | Status: SHIPPED | OUTPATIENT
Start: 2017-10-31 | End: 2018-10-26 | Stop reason: SDUPTHER

## 2017-11-07 ENCOUNTER — HOSPITAL ENCOUNTER (OUTPATIENT)
Dept: LAB | Facility: MEDICAL CENTER | Age: 77
End: 2017-11-07
Attending: NURSE PRACTITIONER
Payer: MEDICARE

## 2017-11-07 DIAGNOSIS — R73.03 PREDIABETES: ICD-10-CM

## 2017-11-07 DIAGNOSIS — I10 ESSENTIAL HYPERTENSION: ICD-10-CM

## 2017-11-07 LAB
ALBUMIN SERPL BCP-MCNC: 4.2 G/DL (ref 3.2–4.9)
ALBUMIN/GLOB SERPL: 1.8 G/DL
ALP SERPL-CCNC: 46 U/L (ref 30–99)
ALT SERPL-CCNC: 14 U/L (ref 2–50)
ANION GAP SERPL CALC-SCNC: 9 MMOL/L (ref 0–11.9)
AST SERPL-CCNC: 16 U/L (ref 12–45)
BILIRUB SERPL-MCNC: 0.5 MG/DL (ref 0.1–1.5)
BUN SERPL-MCNC: 24 MG/DL (ref 8–22)
CALCIUM SERPL-MCNC: 9.4 MG/DL (ref 8.5–10.5)
CHLORIDE SERPL-SCNC: 106 MMOL/L (ref 96–112)
CO2 SERPL-SCNC: 25 MMOL/L (ref 20–33)
CREAT SERPL-MCNC: 0.92 MG/DL (ref 0.5–1.4)
EST. AVERAGE GLUCOSE BLD GHB EST-MCNC: 131 MG/DL
GFR SERPL CREATININE-BSD FRML MDRD: 59 ML/MIN/1.73 M 2
GLOBULIN SER CALC-MCNC: 2.4 G/DL (ref 1.9–3.5)
GLUCOSE SERPL-MCNC: 96 MG/DL (ref 65–99)
HBA1C MFR BLD: 6.2 % (ref 0–5.6)
POTASSIUM SERPL-SCNC: 4.5 MMOL/L (ref 3.6–5.5)
PROT SERPL-MCNC: 6.6 G/DL (ref 6–8.2)
SODIUM SERPL-SCNC: 140 MMOL/L (ref 135–145)

## 2017-11-07 PROCEDURE — 80053 COMPREHEN METABOLIC PANEL: CPT

## 2017-11-07 PROCEDURE — 83036 HEMOGLOBIN GLYCOSYLATED A1C: CPT | Mod: GA

## 2017-11-07 PROCEDURE — 36415 COLL VENOUS BLD VENIPUNCTURE: CPT

## 2017-11-14 ENCOUNTER — OFFICE VISIT (OUTPATIENT)
Dept: MEDICAL GROUP | Facility: PHYSICIAN GROUP | Age: 77
End: 2017-11-14
Payer: MEDICARE

## 2017-11-14 VITALS
OXYGEN SATURATION: 60 % | HEIGHT: 60 IN | WEIGHT: 155 LBS | DIASTOLIC BLOOD PRESSURE: 86 MMHG | TEMPERATURE: 98 F | RESPIRATION RATE: 20 BRPM | SYSTOLIC BLOOD PRESSURE: 138 MMHG | BODY MASS INDEX: 30.43 KG/M2 | HEART RATE: 95 BPM

## 2017-11-14 DIAGNOSIS — E66.9 OBESITY (BMI 30.0-34.9): ICD-10-CM

## 2017-11-14 DIAGNOSIS — R13.10 PAINFUL SWALLOWING: ICD-10-CM

## 2017-11-14 DIAGNOSIS — Z59.89 INSURANCE COVERAGE PROBLEMS: ICD-10-CM

## 2017-11-14 DIAGNOSIS — I10 ESSENTIAL HYPERTENSION: ICD-10-CM

## 2017-11-14 DIAGNOSIS — R49.0 HOARSENESS OF VOICE: ICD-10-CM

## 2017-11-14 DIAGNOSIS — R73.02 IMPAIRED GLUCOSE TOLERANCE: ICD-10-CM

## 2017-11-14 DIAGNOSIS — J43.9 PULMONARY EMPHYSEMA, UNSPECIFIED EMPHYSEMA TYPE (HCC): ICD-10-CM

## 2017-11-14 DIAGNOSIS — E78.5 DYSLIPIDEMIA: ICD-10-CM

## 2017-11-14 DIAGNOSIS — R00.1 BRADYCARDIA: ICD-10-CM

## 2017-11-14 DIAGNOSIS — N28.9 RENAL INSUFFICIENCY: ICD-10-CM

## 2017-11-14 DIAGNOSIS — R79.89 ELEVATED TSH: ICD-10-CM

## 2017-11-14 PROCEDURE — 99214 OFFICE O/P EST MOD 30 MIN: CPT | Performed by: NURSE PRACTITIONER

## 2017-11-14 RX ORDER — FLUTICASONE PROPIONATE 50 MCG
2 SPRAY, SUSPENSION (ML) NASAL DAILY
Qty: 16 G | Refills: 0 | Status: SHIPPED | OUTPATIENT
Start: 2017-11-14 | End: 2019-06-07

## 2017-11-14 RX ORDER — OMEPRAZOLE 20 MG/1
20 CAPSULE, DELAYED RELEASE ORAL DAILY
Qty: 30 CAP | Refills: 0 | Status: SHIPPED | OUTPATIENT
Start: 2017-11-14 | End: 2017-12-12 | Stop reason: SDUPTHER

## 2017-11-14 SDOH — ECONOMIC STABILITY - INCOME SECURITY: OTHER PROBLEMS RELATED TO HOUSING AND ECONOMIC CIRCUMSTANCES: Z59.89

## 2017-11-14 NOTE — ASSESSMENT & PLAN NOTE
Chronic problem well controlled on current medications. Does not monitor blood pressure at home.

## 2017-11-14 NOTE — ASSESSMENT & PLAN NOTE
New problem this year. Not on glucose lowering therapy. Has poor diet as she cannot control diet well d/t finances. She has been making efforts to eat less simple sugar though    Ref. Range 4/18/2017 13:40 11/7/2017 13:21   Glycohemoglobin Latest Ref Range: 0.0 - 5.6 % 6.0 6.2 (H)   Estim. Avg Glu Latest Units: mg/dL  131

## 2017-11-14 NOTE — ASSESSMENT & PLAN NOTE
Has had hoarseness of voice since heart surgery in 2014. It comes after talking a lot. She does have chronic runny nose. Denies any dysphagia or globus sensation, but states at least once a week during eating, swallowing food causes severe pain in the esophageal region when food gets stuck

## 2017-11-14 NOTE — ASSESSMENT & PLAN NOTE
Ongoing chronic problem that remains well controlled with Symbicort. Doing well. Does not use rescue inhaler at all. Due for PFT

## 2017-11-14 NOTE — ASSESSMENT & PLAN NOTE
Ongoing problem, improved since Lasix has been decreased to only three times weekly.    Ref. Range 4/11/2017 11:30 11/7/2017 13:21   Bun Latest Ref Range: 8 - 22 mg/dL 28 (H) 24 (H)   Creatinine Latest Ref Range: 0.50 - 1.40 mg/dL 1.18 0.92   GFR If  Latest Ref Range: >60 mL/min/1.73 m 2 54 (A) >60   GFR If Non  Latest Ref Range: >60 mL/min/1.73 m 2 44 (A) 59 (A)

## 2017-11-15 ENCOUNTER — PATIENT OUTREACH (OUTPATIENT)
Dept: HEALTH INFORMATION MANAGEMENT | Facility: OTHER | Age: 77
End: 2017-11-15

## 2017-11-15 NOTE — ASSESSMENT & PLAN NOTE
Ongoing problem. She has been in touch with  in regards to this matter and getting food for her. She currently has many questions about insurance and is unsure on who to call and how to get in touch with people in regards to changing her insurance plan.

## 2017-11-15 NOTE — PROGRESS NOTES
Subjective:     Chief Complaint   Patient presents with   • Follow-Up     6 month fv lab review       HPI  Valorie Sierra is a 77 y.o. female here today for routine f/u     Impaired glucose tolerance  New problem this year. Not on glucose lowering therapy. Has poor diet as she cannot control diet well d/t finances. She has been making efforts to eat less simple sugar though    Ref. Range 4/18/2017 13:40 11/7/2017 13:21   Glycohemoglobin Latest Ref Range: 0.0 - 5.6 % 6.0 6.2 (H)   Estim. Avg Glu Latest Units: mg/dL  131       Renal insufficiency  Ongoing problem, improved since Lasix has been decreased to only three times weekly.    Ref. Range 4/11/2017 11:30 11/7/2017 13:21   Bun Latest Ref Range: 8 - 22 mg/dL 28 (H) 24 (H)   Creatinine Latest Ref Range: 0.50 - 1.40 mg/dL 1.18 0.92   GFR If  Latest Ref Range: >60 mL/min/1.73 m 2 54 (A) >60   GFR If Non  Latest Ref Range: >60 mL/min/1.73 m 2 44 (A) 59 (A)       Essential hypertension  Chronic problem well controlled on current medications. Does not monitor blood pressure at home.       Bradycardia  This has resolved since metoprolol was decreased to 12.5 mg BID    COPD (chronic obstructive pulmonary disease)  Ongoing chronic problem that remains well controlled with Symbicort. Doing well. Does not use rescue inhaler at all. Due for PFT    Obesity (BMI 30.0-34.9)  Ongoing problem remains stable, but diet is poor. She does not exercise regularly.     Hoarseness of voice  Has had hoarseness of voice since heart surgery in 2014. It comes after talking a lot. She does have chronic runny nose. Denies any dysphagia or globus sensation, but states at least once a week during eating, swallowing food causes severe pain in the esophageal region when food gets stuck     Financial difficulties  Ongoing problem. She has been in touch with  in regards to this matter and getting food for her. She currently has many questions about  insurance and is unsure on who to call and how to get in touch with people in regards to changing her insurance plan.       Diagnoses of Impaired glucose tolerance, Renal insufficiency, Essential hypertension, Bradycardia, Pulmonary emphysema, unspecified emphysema type (CMS-[facility]), Hoarseness of voice, Obesity (BMI 30.0-34.9), Elevated TSH, Dyslipidemia, and Insurance coverage problems were pertinent to this visit.    Allergies: Iodine; Oxycodone-acetaminophen; and Sulfa drugs  Current medicines (including changes today)  Current Outpatient Prescriptions   Medication Sig Dispense Refill   • fluticasone (FLONASE) 50 MCG/ACT nasal spray Spray 2 Sprays in nose every day. 16 g 0   • omeprazole (PRILOSEC) 20 MG delayed-release capsule Take 1 Cap by mouth every day. 30 Cap 0   • atorvastatin (LIPITOR) 80 MG tablet TAKE 1 TABLET BY MOUTH ONCE DAILY 90 Tab 3   • budesonide-formoterol (SYMBICORT) 80-4.5 MCG/ACT Aerosol INHALE 2 PUFFS BY MOUTH 2 TIMES A DAY. 10.2 Inhaler 3   • lisinopril (PRINIVIL) 20 MG Tab 20 mg every day.  3   • metoprolol (LOPRESSOR) 25 MG Tab Take 0.5 Tabs by mouth 2 Times a Day. 60 Tab 11   • furosemide (LASIX) 20 MG Tab Take 0.5 Tabs by mouth every day. 45 Tab 3   • vitamin D (CHOLECALCIFEROL) 1000 UNIT Tab Take 1,000 Units by mouth every day.     • Calcium Carbonate-Vit D-Min (CALCIUM 1200 PO) Take  by mouth.     • Lactobacillus Rhamnosus, GG, (CULTURELLE PO) Take  by mouth every day.     • aspirin EC (ECOTRIN) 81 MG TBEC Take 1 Tab by mouth every day. 90 Tab 3     No current facility-administered medications for this visit.        She  has a past medical history of Anemia; Anxiety; Arrhythmia; Arthritis; Blood transfusion, without reported diagnosis; CHF (congestive heart failure) (CMS-[facility]); Chronic airway obstruction, not elsewhere classified; Depression; Emphysema; GERD (gastroesophageal reflux disease); Headache, classical migraine; Heart attack; Heart murmur; Hyperlipidemia; Hypertension; IBD  (inflammatory bowel disease); Kidney disease; Ulcer (CMS-HCC); and Unspecified cataract.    Health Maintenance: PFT due     ROS  As stated in HPI and additionally  Head/Neck: No unusual headache, dizziness  Nose: No discharge, sinus pain  Mouth/Throat: +hoarseness. No sores or lesions, sore throat  Lungs: No cough, sob, dyspnea, hemoptysis  Cardiac: No chest pain, palpitations, syncope or near syncope, THOMAS, LE edema  Endo: No polydipsia, polyuria       Objective:     Blood pressure 138/86, pulse 95, temperature 36.7 °C (98 °F), resp. rate 20, height 1.524 m (5'), weight 70.3 kg (155 lb), SpO2 (!) 60 %. Body mass index is 30.27 kg/m².  Physical Exam:  General: Alert, oriented, in no acute distress.  Eye contact is good, speech goal directed, affect calm  CNs grossly intact.  HEENT: conjunctiva non-injected, sclera non-icteric, EOMs intact. No lid edema or eye drainage.   Gross hearing intact.  Oral mucous membranes pink and moist with no lesions. Oropharynx with erythema, but no exudate.   Lungs: unlabored. clear to auscultation bilaterally with good excursion.  CV: regular rate and rhythm. No murmurs. No carotid bruits.   Ext: no edema, normal color and temperature.   Skin: No rashes or lesions in visible areas  Gait steady.     Assessment and Plan:   Assessment/Plan:  1. Impaired glucose tolerance  Stable. Continue without glucose lowering therapy. Continue with dietary modifications. Follow-up A1c in 5 months  - HEMOGLOBIN A1C; Future    2. Renal insufficiency  Stable. Continue avoidance of NSAIDs and adequate hydration. Continue follow-up with nephrology as recommended    3. Essential hypertension  Stable on current meds. Goal BP less than 150/90  - COMP METABOLIC PANEL; Future    4. Bradycardia  Resolved    5. Pulmonary emphysema, unspecified emphysema type (CMS-HCC)  Stable on medication. Pulmonary function test due   - CBC WITH DIFFERENTIAL; Future    6. Hoarseness of voice  Unsure of etiology, but concern  about episodes of painful eating. Start omeprazole daily. Start flonase. Refer to GI for consideration of endoscopy  - fluticasone (FLONASE) 50 MCG/ACT nasal spray; Spray 2 Sprays in nose every day.  Dispense: 16 g; Refill: 0  - omeprazole (PRILOSEC) 20 MG delayed-release capsule; Take 1 Cap by mouth every day.  Dispense: 30 Cap; Refill: 0  - CBC WITH DIFFERENTIAL; Future  - Referral to GI    7. Obesity (BMI 30.0-34.9)  - Patient identified as having weight management issue.  Appropriate orders and counseling given.    8. Elevated TSH  Not discussed but labs will be due in 5 months   - TSH WITH REFLEX TO FT4; Future    9. Dyslipidemia  Not discussed but labs will be due in 5 months   - LIPID PROFILE; Future    10. Insurance coverage problems  Refer to social work to help her get correct contact information to get insurance plan stabilized.   - REFERRAL TO COMPLEX CARE MANAGEMENT Services Requested: Care Manager to Evaluate and Recommend,        Follow up:  Return in about 5 months (around 4/14/2018).    Educated in proper administration of medication(s) ordered today including safety, possible SE, risks, benefits, rationale and alternatives to therapy.   Supportive care, differential diagnoses, and indications for immediate follow-up discussed with patient.    Pathogenesis of diagnosis discussed including typical length and natural progression.    Instructed to return to clinic or nearest emergency department for any change in condition, further concerns, or worsening of symptoms.  Patient states understanding of the plan of care and discharge instructions.      Please note that this dictation was created using voice recognition software. I have made every reasonable attempt to correct obvious errors, but I expect that there are errors of grammar and possibly content that I did not discover before finalizing the note.    Followup: Return in about 5 months (around 4/14/2018). sooner should new symptoms  or problems arise.

## 2017-11-22 DIAGNOSIS — J43.9 PULMONARY EMPHYSEMA, UNSPECIFIED EMPHYSEMA TYPE (HCC): ICD-10-CM

## 2017-12-12 ENCOUNTER — HOSPITAL ENCOUNTER (OUTPATIENT)
Dept: OTHER | Facility: MEDICAL CENTER | Age: 77
End: 2017-12-12
Attending: NURSE PRACTITIONER
Payer: MEDICARE

## 2017-12-12 DIAGNOSIS — R49.0 HOARSENESS OF VOICE: ICD-10-CM

## 2017-12-12 PROCEDURE — 94060 EVALUATION OF WHEEZING: CPT | Mod: 26 | Performed by: INTERNAL MEDICINE

## 2017-12-12 PROCEDURE — 94726 PLETHYSMOGRAPHY LUNG VOLUMES: CPT

## 2017-12-12 PROCEDURE — 94729 DIFFUSING CAPACITY: CPT

## 2017-12-12 PROCEDURE — 94060 EVALUATION OF WHEEZING: CPT

## 2017-12-12 PROCEDURE — 94729 DIFFUSING CAPACITY: CPT | Mod: 26 | Performed by: INTERNAL MEDICINE

## 2017-12-12 PROCEDURE — 94726 PLETHYSMOGRAPHY LUNG VOLUMES: CPT | Mod: 26 | Performed by: INTERNAL MEDICINE

## 2017-12-12 RX ORDER — OMEPRAZOLE 20 MG/1
20 CAPSULE, DELAYED RELEASE ORAL DAILY
Qty: 90 CAP | Refills: 3 | Status: SHIPPED | OUTPATIENT
Start: 2017-12-12 | End: 2019-01-03 | Stop reason: SDUPTHER

## 2017-12-12 ASSESSMENT — PULMONARY FUNCTION TESTS
FVC_PERCENT_PREDICTED: 71
FEV1/FVC: 69.57
FEV1/FVC_PERCENT_PREDICTED: 95
FEV1/FVC_PERCENT_PREDICTED: 92
FEV1_PREDICTED: 1.72
FEV1_PERCENT_CHANGE: 27
FEV1_PERCENT_PREDICTED: 65
FEV1: 1.12
FEV1: .91
FEV1/FVC: 72
FVC: 1.61
FVC: 1.27
FVC_PREDICTED: 2.24
FEV1_PERCENT_CHANGE: 22
FEV1_PERCENT_PREDICTED: 53
FVC_PERCENT_PREDICTED: 56
FEV1/FVC_PERCENT_PREDICTED: 77
FEV1/FVC_PERCENT_CHANGE: 81

## 2017-12-14 NOTE — PROCEDURES
TECHNICIAN NOTE:  The patient had good effort and cooperation.  The results of   the test meet ATS standards for acceptability and repeatability.    SPIROMETRY:  1.  FVC was 1.61 liters, 71% of predicted.  2.  FEV1 was 1.12 liters, 65% of predicted.  3.  FEV1/FVC ratio was 69%.  4.  There was very good response to bronchodilators with FEV1 increased by   22%.    LUNG VOLUMES:  1.  Total lung capacity was 3.84 liters, 86% of predicted.  2.  Residual volume was 2.03 liters, 94% of predicted.    Diffusion capacity was low normal at 84% of predicted.    IMPRESSION:  The patient has moderate obstructive ventilatory defect with FEV1   of 65% predicted and FEV1/FVC ratio was 69%.  These findings are consistent   with the patient's listed diagnosis of COPD.  Clinical correlation is   required.       ____________________________________     MD JESSICA Leigh / DARRYL    DD:  12/13/2017 17:07:08  DT:  12/13/2017 18:39:26    D#:  [id number]  Job#:  [id number]

## 2018-01-04 DIAGNOSIS — I10 ESSENTIAL HYPERTENSION: ICD-10-CM

## 2018-01-04 RX ORDER — LISINOPRIL 20 MG/1
TABLET ORAL
Qty: 90 TAB | Refills: 0 | Status: SHIPPED | OUTPATIENT
Start: 2018-01-04 | End: 2018-03-30 | Stop reason: SDUPTHER

## 2018-01-23 ENCOUNTER — OFFICE VISIT (OUTPATIENT)
Dept: CARDIOLOGY | Facility: MEDICAL CENTER | Age: 78
End: 2018-01-23
Payer: MEDICARE

## 2018-01-23 VITALS
HEART RATE: 64 BPM | HEIGHT: 60 IN | WEIGHT: 150 LBS | BODY MASS INDEX: 29.45 KG/M2 | DIASTOLIC BLOOD PRESSURE: 80 MMHG | SYSTOLIC BLOOD PRESSURE: 136 MMHG

## 2018-01-23 DIAGNOSIS — R00.1 BRADYCARDIA: ICD-10-CM

## 2018-01-23 DIAGNOSIS — E78.5 DYSLIPIDEMIA: ICD-10-CM

## 2018-01-23 DIAGNOSIS — I10 ESSENTIAL HYPERTENSION: ICD-10-CM

## 2018-01-23 DIAGNOSIS — Z95.1 S/P CABG X 5: ICD-10-CM

## 2018-01-23 DIAGNOSIS — J43.1 PANLOBULAR EMPHYSEMA (HCC): ICD-10-CM

## 2018-01-23 PROCEDURE — 99214 OFFICE O/P EST MOD 30 MIN: CPT | Performed by: INTERNAL MEDICINE

## 2018-01-23 ASSESSMENT — ENCOUNTER SYMPTOMS
VOMITING: 0
WEIGHT LOSS: 0
SHORTNESS OF BREATH: 0
BRUISES/BLEEDS EASILY: 0
BACK PAIN: 1
NERVOUS/ANXIOUS: 1
NAUSEA: 0
HEARTBURN: 0
PALPITATIONS: 0
NEUROLOGICAL NEGATIVE: 1
FEVER: 0

## 2018-01-23 NOTE — PROGRESS NOTES
Subjective:   Chief complaint: Prior CABG. Valorie Sierra is a 76 y.o. female who presents today for follow-up of coronary artery disease and CABG. She underwent bypass surgery ×5 vessels in April, 2015. She's had no chest pain, dizziness, or dyspnea. Last visit, her furosemide was cut down to thrice weekly. She has occasional mild edema but no major issue. Since last visit she's had pulmonary studies and has been diagnosed with COPD. Labs from November was reviewed with her   Past Medical History:   Diagnosis Date   • Anemia    • Anxiety    • Arrhythmia    • Arthritis    • Blood transfusion, without reported diagnosis    • CHF (congestive heart failure) (CMS-[facility])    • Chronic airway obstruction, not elsewhere classified    • Depression    • Emphysema    • GERD (gastroesophageal reflux disease)    • Headache, classical migraine    • Heart attack    • Heart murmur    • Hyperlipidemia    • Hypertension    • IBD (inflammatory bowel disease)    • Kidney disease    • Ulcer (CMS-[facility])    • Unspecified cataract      Past Surgical History:   Procedure Laterality Date   • MULTIPLE CORONARY ARTERY BYPASS ENDO VEIN HARVEST  9/1/2014    Performed by Stephen Nowak M.D. at SURGERY [facility] ORS   • BLADDER NECK CONTRACTURE EXICISION  1970   • GANGLION EXCISION  1969    left wrist   • ABDOMINAL EXPLORATION     • ABDOMINAL HYSTERECTOMY TOTAL      fibroids   • APPENDECTOMY     • DENTAL EXTRACTION(S)       Family History   Problem Relation Age of Onset   • Stroke Mother    • Hyperlipidemia Mother    • Hypertension Mother    • Lung Disease Mother      smoker   • Heart Disease Father    • Hypertension Father    • Hyperlipidemia Father    • Lung Disease Father      smoker   • Alcohol/Drug Father      etoh   • Other Father      aneurysm   • Genetic Maternal Aunt      Parkinsons   • Heart Disease Paternal Aunt    • Hypertension Paternal Aunt    • Hyperlipidemia Paternal Aunt    • Psychiatry Paternal Aunt      dementia   • Lung  Disease Paternal Aunt      smoker   • Heart Disease Paternal Uncle    • Hypertension Paternal Uncle    • Hyperlipidemia Paternal Uncle    • Arthritis Maternal Grandmother      osteoporosis   • Lung Disease Maternal Grandmother      smoker   • Heart Disease Paternal Grandmother    • Hypertension Paternal Grandmother    • Hyperlipidemia Paternal Grandmother    • Stroke Paternal Grandmother      dvt     History   Smoking Status   • Former Smoker   • Packs/day: 3.00   • Years: 55.00   • Types: Cigarettes   • Quit date: 8/29/2007   Smokeless Tobacco   • Never Used     Allergies   Allergen Reactions   • Iodine Itching   • Oxycodone-Acetaminophen Hives   • Sulfa Drugs Vomiting     Outpatient Encounter Prescriptions as of 1/23/2018   Medication Sig Dispense Refill   • lisinopril (PRINIVIL) 20 MG Tab TAKE 1 TABLET EVERY DAY 90 Tab 0   • omeprazole (PRILOSEC) 20 MG delayed-release capsule TAKE 1 CAP BY MOUTH EVERY DAY. 90 Cap 3   • fluticasone (FLONASE) 50 MCG/ACT nasal spray Spray 2 Sprays in nose every day. 16 g 0   • atorvastatin (LIPITOR) 80 MG tablet TAKE 1 TABLET BY MOUTH ONCE DAILY 90 Tab 3   • budesonide-formoterol (SYMBICORT) 80-4.5 MCG/ACT Aerosol INHALE 2 PUFFS BY MOUTH 2 TIMES A DAY. 10.2 Inhaler 3   • lisinopril (PRINIVIL) 20 MG Tab 20 mg every day.  3   • metoprolol (LOPRESSOR) 25 MG Tab Take 0.5 Tabs by mouth 2 Times a Day. 60 Tab 11   • furosemide (LASIX) 20 MG Tab Take 0.5 Tabs by mouth every day. 45 Tab 3   • vitamin D (CHOLECALCIFEROL) 1000 UNIT Tab Take 1,000 Units by mouth every day.     • Calcium Carbonate-Vit D-Min (CALCIUM 1200 PO) Take  by mouth.     • Lactobacillus Rhamnosus, GG, (CULTURELLE PO) Take  by mouth every day.     • aspirin EC (ECOTRIN) 81 MG TBEC Take 1 Tab by mouth every day. 90 Tab 3     No facility-administered encounter medications on file as of 1/23/2018.      Review of Systems   Constitutional: Positive for malaise/fatigue. Negative for fever and weight loss.   HENT: Negative for  hearing loss.    Respiratory: Negative for shortness of breath.    Cardiovascular: Negative for chest pain and palpitations.   Gastrointestinal: Negative for heartburn, nausea and vomiting.   Musculoskeletal: Positive for back pain.   Skin: Negative for rash.   Neurological: Negative.    Endo/Heme/Allergies: Does not bruise/bleed easily.   Psychiatric/Behavioral: The patient is nervous/anxious.         Objective:   /80   Pulse 64   Ht 1.524 m (5')   Wt 68 kg (150 lb)   BMI 29.29 kg/m²     Physical Exam   Constitutional: She is oriented to person, place, and time. She appears well-developed and well-nourished. No distress.   HENT:   Head: Atraumatic.   Eyes: Conjunctivae and EOM are normal. Pupils are equal, round, and reactive to light.   Neck: Neck supple. No JVD present.   Cardiovascular: Normal rate and regular rhythm.    No murmur heard.  Pulmonary/Chest: Effort normal and breath sounds normal. No respiratory distress. She has no wheezes. She has no rales.   Abdominal: Soft. There is no tenderness.   Musculoskeletal: She exhibits no edema.   Mild kyphosis   Neurological: She is alert and oriented to person, place, and time.   Skin: Skin is warm and dry. She is not diaphoretic.       Assessment:     1. Bradycardia  ECHOCARDIOGRAM COMP W/O CONT   2. S/P CABG x 5  ECHOCARDIOGRAM COMP W/O CONT   3. Essential hypertension  ECHOCARDIOGRAM COMP W/O CONT   4. Dyslipidemia  ECHOCARDIOGRAM COMP W/O CONT   5. Panlobular emphysema (CMS-HCC)  ECHOCARDIOGRAM COMP W/O CONT       Medical Decision Making:  Today's Assessment / Status / Plan:     She is now almost 3 and half years post CABG and doing well. Blood pressure is under good control. Last visit, her furosemide was reduced and 3 times a week without any problems. Her metoprolol has been reduced as well due to bradycardia. We'll consider reducing her medications further   An echo be obtained to reevaluate you wait her LV function and assess her pulmonary artery  pressures. For the moment, and occasional B The same. Return 6 months.

## 2018-03-30 DIAGNOSIS — I10 ESSENTIAL HYPERTENSION: ICD-10-CM

## 2018-03-30 RX ORDER — DILTIAZEM HYDROCHLORIDE 60 MG/1
TABLET, FILM COATED ORAL
Qty: 10.2 INHALER | Refills: 5 | Status: SHIPPED | OUTPATIENT
Start: 2018-03-30 | End: 2018-10-24 | Stop reason: SDUPTHER

## 2018-03-30 RX ORDER — LISINOPRIL 20 MG/1
TABLET ORAL
Qty: 90 TAB | Refills: 3 | Status: SHIPPED | OUTPATIENT
Start: 2018-03-30 | End: 2019-03-20 | Stop reason: SDUPTHER

## 2018-04-02 DIAGNOSIS — R60.9 EDEMA, UNSPECIFIED TYPE: ICD-10-CM

## 2018-04-03 RX ORDER — FUROSEMIDE 20 MG/1
TABLET ORAL
Qty: 45 TAB | Refills: 3 | Status: SHIPPED | OUTPATIENT
Start: 2018-04-03 | End: 2018-06-14 | Stop reason: SDUPTHER

## 2018-04-11 ENCOUNTER — HOSPITAL ENCOUNTER (OUTPATIENT)
Dept: LAB | Facility: MEDICAL CENTER | Age: 78
End: 2018-04-11
Attending: NURSE PRACTITIONER
Payer: MEDICARE

## 2018-04-11 DIAGNOSIS — E78.5 DYSLIPIDEMIA: ICD-10-CM

## 2018-04-11 DIAGNOSIS — R49.0 HOARSENESS OF VOICE: ICD-10-CM

## 2018-04-11 DIAGNOSIS — I10 ESSENTIAL HYPERTENSION: ICD-10-CM

## 2018-04-11 DIAGNOSIS — R79.89 ELEVATED TSH: ICD-10-CM

## 2018-04-11 DIAGNOSIS — R73.02 IMPAIRED GLUCOSE TOLERANCE: ICD-10-CM

## 2018-04-11 DIAGNOSIS — J43.9 PULMONARY EMPHYSEMA, UNSPECIFIED EMPHYSEMA TYPE (HCC): ICD-10-CM

## 2018-04-11 LAB
ALBUMIN SERPL BCP-MCNC: 4.1 G/DL (ref 3.2–4.9)
ALBUMIN/GLOB SERPL: 1.5 G/DL
ALP SERPL-CCNC: 73 U/L (ref 30–99)
ALT SERPL-CCNC: 16 U/L (ref 2–50)
ANION GAP SERPL CALC-SCNC: 8 MMOL/L (ref 0–11.9)
AST SERPL-CCNC: 19 U/L (ref 12–45)
BASOPHILS # BLD AUTO: 0.8 % (ref 0–1.8)
BASOPHILS # BLD: 0.06 K/UL (ref 0–0.12)
BILIRUB SERPL-MCNC: 0.6 MG/DL (ref 0.1–1.5)
BUN SERPL-MCNC: 23 MG/DL (ref 8–22)
CALCIUM SERPL-MCNC: 9.5 MG/DL (ref 8.5–10.5)
CHLORIDE SERPL-SCNC: 107 MMOL/L (ref 96–112)
CHOLEST SERPL-MCNC: 160 MG/DL (ref 100–199)
CO2 SERPL-SCNC: 27 MMOL/L (ref 20–33)
CREAT SERPL-MCNC: 0.97 MG/DL (ref 0.5–1.4)
EOSINOPHIL # BLD AUTO: 0.19 K/UL (ref 0–0.51)
EOSINOPHIL NFR BLD: 2.5 % (ref 0–6.9)
ERYTHROCYTE [DISTWIDTH] IN BLOOD BY AUTOMATED COUNT: 51.2 FL (ref 35.9–50)
EST. AVERAGE GLUCOSE BLD GHB EST-MCNC: 128 MG/DL
GLOBULIN SER CALC-MCNC: 2.7 G/DL (ref 1.9–3.5)
GLUCOSE SERPL-MCNC: 90 MG/DL (ref 65–99)
HBA1C MFR BLD: 6.1 % (ref 0–5.6)
HCT VFR BLD AUTO: 40.6 % (ref 37–47)
HDLC SERPL-MCNC: 62 MG/DL
HGB BLD-MCNC: 12.8 G/DL (ref 12–16)
IMM GRANULOCYTES # BLD AUTO: 0.02 K/UL (ref 0–0.11)
IMM GRANULOCYTES NFR BLD AUTO: 0.3 % (ref 0–0.9)
LDLC SERPL CALC-MCNC: 85 MG/DL
LYMPHOCYTES # BLD AUTO: 2.49 K/UL (ref 1–4.8)
LYMPHOCYTES NFR BLD: 32.7 % (ref 22–41)
MCH RBC QN AUTO: 29.3 PG (ref 27–33)
MCHC RBC AUTO-ENTMCNC: 31.5 G/DL (ref 33.6–35)
MCV RBC AUTO: 92.9 FL (ref 81.4–97.8)
MONOCYTES # BLD AUTO: 0.65 K/UL (ref 0–0.85)
MONOCYTES NFR BLD AUTO: 8.5 % (ref 0–13.4)
NEUTROPHILS # BLD AUTO: 4.2 K/UL (ref 2–7.15)
NEUTROPHILS NFR BLD: 55.2 % (ref 44–72)
NRBC # BLD AUTO: 0 K/UL
NRBC BLD-RTO: 0 /100 WBC
PLATELET # BLD AUTO: 252 K/UL (ref 164–446)
PMV BLD AUTO: 11.4 FL (ref 9–12.9)
POTASSIUM SERPL-SCNC: 4.4 MMOL/L (ref 3.6–5.5)
PROT SERPL-MCNC: 6.8 G/DL (ref 6–8.2)
RBC # BLD AUTO: 4.37 M/UL (ref 4.2–5.4)
SODIUM SERPL-SCNC: 142 MMOL/L (ref 135–145)
TRIGL SERPL-MCNC: 64 MG/DL (ref 0–149)
TSH SERPL DL<=0.005 MIU/L-ACNC: 3.5 UIU/ML (ref 0.38–5.33)
WBC # BLD AUTO: 7.6 K/UL (ref 4.8–10.8)

## 2018-04-11 PROCEDURE — 36415 COLL VENOUS BLD VENIPUNCTURE: CPT

## 2018-04-11 PROCEDURE — 80053 COMPREHEN METABOLIC PANEL: CPT

## 2018-04-11 PROCEDURE — 80061 LIPID PANEL: CPT

## 2018-04-11 PROCEDURE — 84443 ASSAY THYROID STIM HORMONE: CPT

## 2018-04-11 PROCEDURE — 85025 COMPLETE CBC W/AUTO DIFF WBC: CPT

## 2018-04-11 PROCEDURE — 83036 HEMOGLOBIN GLYCOSYLATED A1C: CPT | Mod: GA

## 2018-04-18 ENCOUNTER — APPOINTMENT (OUTPATIENT)
Dept: MEDICAL GROUP | Facility: PHYSICIAN GROUP | Age: 78
End: 2018-04-18
Payer: MEDICARE

## 2018-04-19 ENCOUNTER — OFFICE VISIT (OUTPATIENT)
Dept: MEDICAL GROUP | Facility: PHYSICIAN GROUP | Age: 78
End: 2018-04-19
Payer: MEDICARE

## 2018-04-19 VITALS
SYSTOLIC BLOOD PRESSURE: 128 MMHG | TEMPERATURE: 97.8 F | OXYGEN SATURATION: 95 % | DIASTOLIC BLOOD PRESSURE: 76 MMHG | HEART RATE: 51 BPM | BODY MASS INDEX: 29.84 KG/M2 | WEIGHT: 152 LBS | HEIGHT: 60 IN

## 2018-04-19 DIAGNOSIS — E78.5 DYSLIPIDEMIA: ICD-10-CM

## 2018-04-19 DIAGNOSIS — I50.32 CHRONIC DIASTOLIC CONGESTIVE HEART FAILURE (HCC): ICD-10-CM

## 2018-04-19 DIAGNOSIS — R73.02 IMPAIRED GLUCOSE TOLERANCE: ICD-10-CM

## 2018-04-19 DIAGNOSIS — I25.119 CORONARY ARTERY DISEASE INVOLVING NATIVE CORONARY ARTERY OF NATIVE HEART WITH ANGINA PECTORIS (HCC): ICD-10-CM

## 2018-04-19 DIAGNOSIS — J43.9 PULMONARY EMPHYSEMA, UNSPECIFIED EMPHYSEMA TYPE (HCC): ICD-10-CM

## 2018-04-19 DIAGNOSIS — F32.A MILD DEPRESSION: ICD-10-CM

## 2018-04-19 DIAGNOSIS — R79.89 ELEVATED TSH: ICD-10-CM

## 2018-04-19 PROBLEM — N28.9 RENAL INSUFFICIENCY: Status: RESOLVED | Noted: 2017-11-14 | Resolved: 2018-04-19

## 2018-04-19 PROBLEM — R49.0 HOARSENESS OF VOICE: Status: RESOLVED | Noted: 2017-11-14 | Resolved: 2018-04-19

## 2018-04-19 PROCEDURE — 99214 OFFICE O/P EST MOD 30 MIN: CPT | Performed by: NURSE PRACTITIONER

## 2018-04-19 NOTE — PROGRESS NOTES
Subjective:     Chief Complaint   Patient presents with   • Diabetes     lab review       HPI  Valorie Sierra is a 77 y.o. female here today for routine f/u.     Impaired glucose tolerance  Chronic problem that is stable without glucose lowering therapy. Denies any polyuria or polydipsia. She does try to manage her diet, but due to her living and finances, she does eat out most of the time as she lives in a hotel.    Ref. Range 4/18/2017 13:40 11/7/2017 13:21 4/11/2018 11:27   Glycohemoglobin Latest Ref Range: 0.0 - 5.6 % 6.0 6.2 (H) 6.1 (H)   Estim. Avg Glu Latest Units: mg/dL  131 128       Elevated TSH  This has resolved.    Ref. Range 4/11/2017 11:30 4/11/2018 11:27   TSH Latest Ref Range: 0.380 - 5.330 uIU/mL 3.340 3.500       Dyslipidemia  Chronic problem that is well controlled with atorvastatin. No myalgias.    Ref. Range 4/11/2017 11:30 4/11/2018 11:27   Cholesterol,Tot Latest Ref Range: 100 - 199 mg/dL 176 160   Triglycerides Latest Ref Range: 0 - 149 mg/dL 99 64   HDL Latest Ref Range: >=40 mg/dL 79 62   LDL Latest Ref Range: <100 mg/dL 77 85       COPD (chronic obstructive pulmonary disease)  Chronic problem that is currently managed with Symbicort. She does not use rescue inhaler. Denies sob, dyspnea, cough, wheezing, sputum, or hemoptysis. Denies any chest pain, LE, or THOMAS.     SPIROMETRY:  1.  FVC was 1.61 liters, 71% of predicted.  2.  FEV1 was 1.12 liters, 65% of predicted.  3.  FEV1/FVC ratio was 69%.  4.  There was very good response to bronchodilators with FEV1 increased by 22%.  LUNG VOLUMES:  1.  Total lung capacity was 3.84 liters, 86% of predicted.  2.  Residual volume was 2.03 liters, 94% of predicted.  Diffusion capacity was low normal at 84% of predicted.  IMPRESSION:  The patient has moderate obstructive ventilatory defect with FEV1of 65% predicted and FEV1/FVC ratio was 69%. These findings are consistent with the patient's listed diagnosis of COPD.  Clinical correlation is  required.    Coronary artery disease involving native coronary artery of native heart with angina pectoris  Chronic problem with hx of CABG managed with aspirin, atorvastatin, metoprolol, and lisinopril. Denies any episodes of chest pain since starting omeprazole. No chest pain, THOMAS, sob, or LE edema. Followed by cardiology     Diastolic CHF  Chronic problem that remains stable with lisinopril, metoprolol, and furosemide. This is followed by cardiology. She gets occasional edema of LE. No abnormal weight gain, sob, or dyspnea.     Mild depression (CMS-HCC)  Reports that she sometimes does experience depression and feels saddened about her life and getting older, but she states she can easily talk herself out of this.  She does not feel like she needs any medications for treatment. Denies any suicidal thoughts or ideations.        Diagnoses of Pulmonary emphysema, unspecified emphysema type (CMS-HCC), Impaired glucose tolerance, Coronary artery disease involving native coronary artery of native heart with angina pectoris (CMS-HCC), Chronic diastolic congestive heart failure (CMS-HCC), Dyslipidemia, Elevated TSH, and Mild depression (CMS-HCC) were pertinent to this visit.    Allergies: Iodine; Oxycodone-acetaminophen; and Sulfa drugs  Current medicines (including changes today)  Current Outpatient Prescriptions   Medication Sig Dispense Refill   • furosemide (LASIX) 20 MG Tab TAKE 1/2 TABLET BY MOUTH ONCE DAILY 45 Tab 3   • lisinopril (PRINIVIL) 20 MG Tab TAKE 1 TABLET BY MOUTH EVERY DAY 90 Tab 3   • SYMBICORT 80-4.5 MCG/ACT Aerosol INHALE 2 PUFFS BY MOUTH 2 TIMES A DAY. 10.2 Inhaler 5   • omeprazole (PRILOSEC) 20 MG delayed-release capsule TAKE 1 CAP BY MOUTH EVERY DAY. 90 Cap 3   • atorvastatin (LIPITOR) 80 MG tablet TAKE 1 TABLET BY MOUTH ONCE DAILY 90 Tab 3   • metoprolol (LOPRESSOR) 25 MG Tab Take 0.5 Tabs by mouth 2 Times a Day. 60 Tab 11   • vitamin D (CHOLECALCIFEROL) 1000 UNIT Tab Take 1,000 Units by mouth  every day.     • Calcium Carbonate-Vit D-Min (CALCIUM 1200 PO) Take  by mouth.     • Lactobacillus Rhamnosus, GG, (CULTURELLE PO) Take  by mouth every day.     • aspirin EC (ECOTRIN) 81 MG TBEC Take 1 Tab by mouth every day. 90 Tab 3   • fluticasone (FLONASE) 50 MCG/ACT nasal spray Spray 2 Sprays in nose every day. 16 g 0     No current facility-administered medications for this visit.        She  has a past medical history of Anemia; Anxiety; Arrhythmia; Arthritis; Blood transfusion, without reported diagnosis; CHF (congestive heart failure) (CMS-HCC); Chronic airway obstruction, not elsewhere classified; Depression; Emphysema; GERD (gastroesophageal reflux disease); Headache, classical migraine; Heart attack; Heart murmur; Hyperlipidemia; Hypertension; IBD (inflammatory bowel disease); Kidney disease; Ulcer (CMS-HCC); and Unspecified cataract.    Health Maintenance: UTD    ROS  As stated in HPI and additionally  Neuro: No unusual headache or dizziness  CV: see HPI   Pulm: see HPI   Endo: see HPI      Objective:     Blood pressure 128/76, pulse (!) 51, temperature 36.6 °C (97.8 °F), height 1.524 m (5'), weight 68.9 kg (152 lb), SpO2 95 %. Body mass index is 29.69 kg/m².  Physical Exam:  General: Alert, oriented, in no acute distress.  Eye contact is good, speech goal directed, affect calm  CNs grossly intact.  HEENT: conjunctiva non-injected, sclera non-icteric, EOMs intact. Glasses in place  Gross hearing intact.  Lungs: unlabored. clear to auscultation bilaterally with good excursion.  CV: sindhu rate and regular rhythm. No murmurs. No carotid bruits.   Ext: trace NP edema, normal color and temperature.   Skin: No rashes or lesions in visible areas  Gait steady with cane.     Assessment and Plan:   Assessment/Plan:  1. Pulmonary emphysema, unspecified emphysema type (CMS-HCC)  Stable continue Symbicort     2. Impaired glucose tolerance  Stable monitor A1C next visit. Continue monitoring carb intake    3. Coronary  artery disease involving native coronary artery of native heart with angina pectoris (CMS-[facility])  Stable continue f/u with cardiology and all current meds    4. Chronic diastolic congestive heart failure (CMS-[facility])  Stable continue f/u with cardiology and all current meds    5. Dyslipidemia  Stable continue statin therapy lipids due in one year    6. Elevated TSH  Resolved.     7. Mild depression (CMS-HCC)  Stable. Monitor.        Follow up:  Return in about 6 months (around 10/19/2018).    Educated in proper administration of medication(s) ordered today including safety, possible SE, risks, benefits, rationale and alternatives to therapy.   Supportive care, differential diagnoses, and indications for immediate follow-up discussed with patient.    Pathogenesis of diagnosis discussed including typical length and natural progression.    Instructed to return to clinic or nearest emergency department for any change in condition, further concerns, or worsening of symptoms.  Patient states understanding of the plan of care and discharge instructions.      Please note that this dictation was created using voice recognition software. I have made every reasonable attempt to correct obvious errors, but I expect that there are errors of grammar and possibly content that I did not discover before finalizing the note.    Followup: Return in about 6 months (around 10/19/2018). sooner should new symptoms or problems arise.

## 2018-04-19 NOTE — ASSESSMENT & PLAN NOTE
Chronic problem with hx of CABG managed with aspirin, atorvastatin, metoprolol, and lisinopril. Denies any episodes of chest pain since starting omeprazole. No chest pain, THOMAS, sob, or LE edema. Followed by cardiology

## 2018-04-19 NOTE — ASSESSMENT & PLAN NOTE
Reports that she sometimes does experience depression and feels saddened about her life and getting older, but she states she can easily talk herself out of this.  She does not feel like she needs any medications for treatment. Denies any suicidal thoughts or ideations.

## 2018-04-19 NOTE — ASSESSMENT & PLAN NOTE
Chronic problem that is well controlled with atorvastatin. No myalgias.    Ref. Range 4/11/2017 11:30 4/11/2018 11:27   Cholesterol,Tot Latest Ref Range: 100 - 199 mg/dL 176 160   Triglycerides Latest Ref Range: 0 - 149 mg/dL 99 64   HDL Latest Ref Range: >=40 mg/dL 79 62   LDL Latest Ref Range: <100 mg/dL 77 85

## 2018-04-19 NOTE — ASSESSMENT & PLAN NOTE
This has resolved.    Ref. Range 4/11/2017 11:30 4/11/2018 11:27   TSH Latest Ref Range: 0.380 - 5.330 uIU/mL 3.340 3.500

## 2018-04-19 NOTE — ASSESSMENT & PLAN NOTE
Chronic problem that remains stable with lisinopril, metoprolol, and furosemide. This is followed by cardiology. She gets occasional edema of LE. No abnormal weight gain, sob, or dyspnea.

## 2018-04-19 NOTE — ASSESSMENT & PLAN NOTE
Chronic problem that is currently managed with Symbicort. She does not use rescue inhaler. Denies sob, dyspnea, cough, wheezing, sputum, or hemoptysis. Denies any chest pain, LE, or THOMAS.     SPIROMETRY:  1.  FVC was 1.61 liters, 71% of predicted.  2.  FEV1 was 1.12 liters, 65% of predicted.  3.  FEV1/FVC ratio was 69%.  4.  There was very good response to bronchodilators with FEV1 increased by 22%.  LUNG VOLUMES:  1.  Total lung capacity was 3.84 liters, 86% of predicted.  2.  Residual volume was 2.03 liters, 94% of predicted.  Diffusion capacity was low normal at 84% of predicted.  IMPRESSION:  The patient has moderate obstructive ventilatory defect with FEV1of 65% predicted and FEV1/FVC ratio was 69%. These findings are consistent with the patient's listed diagnosis of COPD.  Clinical correlation is required.

## 2018-04-19 NOTE — ASSESSMENT & PLAN NOTE
Chronic problem that is stable without glucose lowering therapy. Denies any polyuria or polydipsia. She does try to manage her diet, but due to her living and finances, she does eat out most of the time as she lives in a hotel.    Ref. Range 4/18/2017 13:40 11/7/2017 13:21 4/11/2018 11:27   Glycohemoglobin Latest Ref Range: 0.0 - 5.6 % 6.0 6.2 (H) 6.1 (H)   Estim. Avg Glu Latest Units: mg/dL  131 128

## 2018-05-17 ENCOUNTER — HOSPITAL ENCOUNTER (OUTPATIENT)
Dept: CARDIOLOGY | Facility: MEDICAL CENTER | Age: 78
End: 2018-05-17
Attending: INTERNAL MEDICINE
Payer: MEDICARE

## 2018-05-17 DIAGNOSIS — R00.1 BRADYCARDIA: ICD-10-CM

## 2018-05-17 DIAGNOSIS — I10 ESSENTIAL HYPERTENSION: ICD-10-CM

## 2018-05-17 DIAGNOSIS — E78.5 DYSLIPIDEMIA: ICD-10-CM

## 2018-05-17 DIAGNOSIS — Z95.1 S/P CABG X 5: ICD-10-CM

## 2018-05-17 DIAGNOSIS — J43.1 PANLOBULAR EMPHYSEMA (HCC): ICD-10-CM

## 2018-05-17 PROCEDURE — 93306 TTE W/DOPPLER COMPLETE: CPT | Mod: 26 | Performed by: INTERNAL MEDICINE

## 2018-05-17 PROCEDURE — 93306 TTE W/DOPPLER COMPLETE: CPT

## 2018-05-20 LAB
LV EJECT FRACT  99904: 60
LV EJECT FRACT MOD 2C 99903: 61.51
LV EJECT FRACT MOD 4C 99902: 69
LV EJECT FRACT MOD BP 99901: 66.58

## 2018-06-14 ENCOUNTER — TELEPHONE (OUTPATIENT)
Dept: CARDIOLOGY | Facility: MEDICAL CENTER | Age: 78
End: 2018-06-14

## 2018-06-14 DIAGNOSIS — R60.9 EDEMA, UNSPECIFIED TYPE: ICD-10-CM

## 2018-06-14 RX ORDER — FUROSEMIDE 20 MG/1
TABLET ORAL
Qty: 45 TAB | Refills: 0
Start: 2018-06-14 | End: 2020-09-28

## 2018-06-14 NOTE — TELEPHONE ENCOUNTER
----- Message from Kyle Stanley M.D. sent at 6/13/2018  3:00 PM PDT -----  Echo shows good LV function, mild mitral insufficiency. Pulmonary artery pressure is elevated due to COPD.  If no Edema on  Her low dose lasix, she can stop it., and take it only when she develops edema

## 2018-06-14 NOTE — TELEPHONE ENCOUNTER
Called pt. To advise. She said Dr. Stanley told her to take Lasix 20mg 1/2 tablet 3 times a week for her swelling which seems to work well for her. She will continue this.

## 2018-07-31 ENCOUNTER — OFFICE VISIT (OUTPATIENT)
Dept: CARDIOLOGY | Facility: MEDICAL CENTER | Age: 78
End: 2018-07-31
Payer: MEDICARE

## 2018-07-31 VITALS
DIASTOLIC BLOOD PRESSURE: 80 MMHG | SYSTOLIC BLOOD PRESSURE: 118 MMHG | OXYGEN SATURATION: 56 % | HEIGHT: 60 IN | BODY MASS INDEX: 29.84 KG/M2 | HEART RATE: 94 BPM | WEIGHT: 152 LBS

## 2018-07-31 DIAGNOSIS — J43.1 PANLOBULAR EMPHYSEMA (HCC): ICD-10-CM

## 2018-07-31 DIAGNOSIS — I34.0 NON-RHEUMATIC MITRAL REGURGITATION: ICD-10-CM

## 2018-07-31 DIAGNOSIS — Z95.1 S/P CABG X 5: ICD-10-CM

## 2018-07-31 DIAGNOSIS — I27.20 PULMONARY HYPERTENSION (HCC): ICD-10-CM

## 2018-07-31 DIAGNOSIS — I10 ESSENTIAL HYPERTENSION: ICD-10-CM

## 2018-07-31 PROCEDURE — 99213 OFFICE O/P EST LOW 20 MIN: CPT | Performed by: INTERNAL MEDICINE

## 2018-07-31 ASSESSMENT — ENCOUNTER SYMPTOMS
BACK PAIN: 1
NEUROLOGICAL NEGATIVE: 1
FEVER: 0
VOMITING: 0
WEIGHT LOSS: 0
PALPITATIONS: 0
NAUSEA: 0
HEARTBURN: 0
BRUISES/BLEEDS EASILY: 0
SHORTNESS OF BREATH: 0

## 2018-07-31 NOTE — PROGRESS NOTES
Chief Complaint   Patient presents with   • Coronary Artery Bypass Graft (CABG)       Subjective:   Valorie Sierra is a 77 y.o. female who presents today for follow-up of CABG.  She underwent bypass graft surgery September 2014, with SOCRATES to diagonal, second diagonal was bypassed with a saphenous vein graft and the distal LAD was also bypassed with a saphenous vein graft.  There are also separate saphenous vein graft to the PDA and distal OM.  Recent echo shows preserved LV systolic function with EF of 60% and moderate mitral insufficiency.  Pulmonary artery pressures are elevated at 65 mmHg.  She is not to physically active but denies dyspnea or chest pain.  She has occasional very mild edema.  The patient is taking furosemide thrice weekly.    Past Medical History:   Diagnosis Date   • Anemia    • Anxiety    • Arrhythmia    • Arthritis    • Blood transfusion, without reported diagnosis    • CHF (congestive heart failure) (HCC)    • Chronic airway obstruction, not elsewhere classified    • Depression    • Emphysema    • GERD (gastroesophageal reflux disease)    • Headache, classical migraine    • Heart attack (HCC)    • Heart murmur    • Hyperlipidemia    • Hypertension    • IBD (inflammatory bowel disease)    • Kidney disease    • Ulcer    • Unspecified cataract      Past Surgical History:   Procedure Laterality Date   • MULTIPLE CORONARY ARTERY BYPASS ENDO VEIN HARVEST  9/1/2014    Performed by Stephen Nowak M.D. at SURGERY [facility] ORS   • BLADDER NECK CONTRACTURE EXICISION  1970   • GANGLION EXCISION  1969    left wrist   • ABDOMINAL EXPLORATION     • ABDOMINAL HYSTERECTOMY TOTAL      fibroids   • APPENDECTOMY     • DENTAL EXTRACTION(S)       Family History   Problem Relation Age of Onset   • Stroke Mother    • Hyperlipidemia Mother    • Hypertension Mother    • Lung Disease Mother         smoker   • Heart Disease Father    • Hypertension Father    • Hyperlipidemia Father    • Lung Disease Father          smoker   • Alcohol/Drug Father         etoh   • Other Father         aneurysm   • Genetic Maternal Aunt         Parkinsons   • Heart Disease Paternal Aunt    • Hypertension Paternal Aunt    • Hyperlipidemia Paternal Aunt    • Psychiatry Paternal Aunt         dementia   • Lung Disease Paternal Aunt         smoker   • Heart Disease Paternal Uncle    • Hypertension Paternal Uncle    • Hyperlipidemia Paternal Uncle    • Arthritis Maternal Grandmother         osteoporosis   • Lung Disease Maternal Grandmother         smoker   • Heart Disease Paternal Grandmother    • Hypertension Paternal Grandmother    • Hyperlipidemia Paternal Grandmother    • Stroke Paternal Grandmother         dvt     Social History     Social History   • Marital status:      Spouse name: N/A   • Number of children: N/A   • Years of education: N/A     Occupational History   • Not on file.     Social History Main Topics   • Smoking status: Former Smoker     Packs/day: 3.00     Years: 55.00     Types: Cigarettes     Quit date: 8/29/2007   • Smokeless tobacco: Never Used   • Alcohol use No   • Drug use: No   • Sexual activity: No     Other Topics Concern   • Not on file     Social History Narrative   • No narrative on file     Allergies   Allergen Reactions   • Iodine Itching   • Oxycodone-Acetaminophen Hives   • Sulfa Drugs Vomiting     Outpatient Encounter Prescriptions as of 7/31/2018   Medication Sig Dispense Refill   • furosemide (LASIX) 20 MG Tab Take 1/2 tablet 3 times a week 45 Tab 0   • metoprolol (LOPRESSOR) 25 MG Tab TAKE 1/2 TABLET BY MOUTH TWICE A DAY 90 Tab 3   • lisinopril (PRINIVIL) 20 MG Tab TAKE 1 TABLET BY MOUTH EVERY DAY 90 Tab 3   • SYMBICORT 80-4.5 MCG/ACT Aerosol INHALE 2 PUFFS BY MOUTH 2 TIMES A DAY. 10.2 Inhaler 5   • omeprazole (PRILOSEC) 20 MG delayed-release capsule TAKE 1 CAP BY MOUTH EVERY DAY. 90 Cap 3   • fluticasone (FLONASE) 50 MCG/ACT nasal spray Spray 2 Sprays in nose every day. 16 g 0   • atorvastatin  (LIPITOR) 80 MG tablet TAKE 1 TABLET BY MOUTH ONCE DAILY 90 Tab 3   • vitamin D (CHOLECALCIFEROL) 1000 UNIT Tab Take 1,000 Units by mouth every day.     • Calcium Carbonate-Vit D-Min (CALCIUM 1200 PO) Take  by mouth.     • Lactobacillus Rhamnosus, GG, (CULTURELLE PO) Take  by mouth every day.     • aspirin EC (ECOTRIN) 81 MG TBEC Take 1 Tab by mouth every day. 90 Tab 3     No facility-administered encounter medications on file as of 7/31/2018.      Review of Systems   Constitutional: Negative for fever, malaise/fatigue and weight loss.   HENT: Negative for hearing loss.    Respiratory: Negative for shortness of breath.    Cardiovascular: Negative for chest pain and palpitations.   Gastrointestinal: Negative for heartburn, nausea and vomiting.   Musculoskeletal: Positive for back pain.   Skin: Negative for rash.   Neurological: Negative.    Endo/Heme/Allergies: Does not bruise/bleed easily.        Objective:   /80   Pulse 94   Ht 1.524 m (5')   Wt 68.9 kg (152 lb)   SpO2 (!) 56%   BMI 29.69 kg/m²     Physical Exam   Constitutional: She is oriented to person, place, and time. No distress.   Walks with a cane   HENT:   Head: Normocephalic and atraumatic.   Eyes: Pupils are equal, round, and reactive to light. No scleral icterus.   Neck: No JVD present.   Cardiovascular: Normal rate and regular rhythm.    Murmur heard.  Pulmonary/Chest: No respiratory distress. She has no wheezes.   Abdominal: Soft. She exhibits no distension. There is no tenderness.   Musculoskeletal: She exhibits no edema.   Neurological: She is alert and oriented to person, place, and time.   Skin: Skin is warm.   Psychiatric: She has a normal mood and affect.       Assessment:     1. S/P CABG x 5     2. Panlobular emphysema (HCC)     3. Essential hypertension     4. Pulmonary hypertension (HCC)     5. Non-rheumatic mitral regurgitation         Medical Decision Making:  Today's Assessment / Status / Plan:   Status post CABG: She is now  nearly 4 years post bypass graft surgery and doing well.  No angina.    Mitral insufficiency: Recent echo personally reviewed.  The mitral insufficiency is moderate severity.    Pulmonary hypertension: Likely secondary to combination of valvular disease and COPD.  Continue current medical regimen.  She is not significantly symptomatic at this point.    Hypertension: Under good control.  Continue current medical regimen.  The lisinopril should help with afterload reduction and hopefully lessen the amount of mitral insufficiency.  Return in 6 months.

## 2018-07-31 NOTE — LETTER
[facility]   [address], [address]  [place] [address]  Phone: [phone]  Fax: [phone]              Valorie Sierra  1940    Encounter Date: 7/31/2018    Kyle Stanley M.D.          PROGRESS NOTE:  Chief Complaint   Patient presents with   • Coronary Artery Bypass Graft (CABG)       Subjective:   Valorie Sierra is a 77 y.o. female who presents today for follow-up of CABG.  She underwent bypass graft surgery September 2014, with SOCRATES to diagonal, second diagonal was bypassed with a saphenous vein graft and the distal LAD was also bypassed with a saphenous vein graft.  There are also separate saphenous vein graft to the PDA and distal OM.  Recent echo shows preserved LV systolic function with EF of 60% and moderate mitral insufficiency.  Pulmonary artery pressures are elevated at 65 mmHg.  She is not to physically active but denies dyspnea or chest pain.  She has occasional very mild edema.  The patient is taking furosemide thrice weekly.    Past Medical History:   Diagnosis Date   • Anemia    • Anxiety    • Arrhythmia    • Arthritis    • Blood transfusion, without reported diagnosis    • CHF (congestive heart failure) (HCC)    • Chronic airway obstruction, not elsewhere classified    • Depression    • Emphysema    • GERD (gastroesophageal reflux disease)    • Headache, classical migraine    • Heart attack (HCC)    • Heart murmur    • Hyperlipidemia    • Hypertension    • IBD (inflammatory bowel disease)    • Kidney disease    • Ulcer    • Unspecified cataract      Past Surgical History:   Procedure Laterality Date   • MULTIPLE CORONARY ARTERY BYPASS ENDO VEIN HARVEST  9/1/2014    Performed by Stephen Nowak M.D. at SURGERY [facility] ORS   • BLADDER NECK CONTRACTURE EXICISION  1970   • GANGLION EXCISION  1969    left wrist   • ABDOMINAL EXPLORATION     • ABDOMINAL HYSTERECTOMY TOTAL      fibroids   • APPENDECTOMY     • DENTAL EXTRACTION(S)            Family History   Problem Relation Age of Onset   • Stroke Mother    • Hyperlipidemia Mother    • Hypertension Mother    • Lung Disease Mother         smoker   • Heart Disease Father    • Hypertension Father    • Hyperlipidemia Father    • Lung Disease Father         smoker   • Alcohol/Drug Father         etoh   • Other Father         aneurysm   • Genetic Maternal Aunt         Parkinsons   • Heart Disease Paternal Aunt    • Hypertension Paternal Aunt    • Hyperlipidemia Paternal Aunt    • Psychiatry Paternal Aunt         dementia   • Lung Disease Paternal Aunt         smoker   • Heart Disease Paternal Uncle    • Hypertension Paternal Uncle    • Hyperlipidemia Paternal Uncle    • Arthritis Maternal Grandmother         osteoporosis   • Lung Disease Maternal Grandmother         smoker   • Heart Disease Paternal Grandmother    • Hypertension Paternal Grandmother    • Hyperlipidemia Paternal Grandmother    • Stroke Paternal Grandmother         dvt     Social History     Social History   • Marital status:      Spouse name: N/A   • Number of children: N/A   • Years of education: N/A     Occupational History   • Not on file.     Social History Main Topics   • Smoking status: Former Smoker     Packs/day: 3.00     Years: 55.00     Types: Cigarettes     Quit date: 8/29/2007   • Smokeless tobacco: Never Used   • Alcohol use No   • Drug use: No   • Sexual activity: No     Other Topics Concern   • Not on file     Social History Narrative   • No narrative on file     Allergies   Allergen Reactions   • Iodine Itching   • Oxycodone-Acetaminophen Hives   • Sulfa Drugs Vomiting     Outpatient Encounter Prescriptions as of 7/31/2018   Medication Sig Dispense Refill   • furosemide (LASIX) 20 MG Tab Take 1/2 tablet 3 times a week 45 Tab 0   • metoprolol (LOPRESSOR) 25 MG Tab TAKE 1/2 TABLET BY MOUTH TWICE A DAY 90 Tab 3   • lisinopril (PRINIVIL) 20 MG Tab TAKE 1 TABLET BY MOUTH EVERY DAY 90 Tab 3   • SYMBICORT 80-4.5 MCG/ACT  Aerosol INHALE 2 PUFFS BY MOUTH 2 TIMES A DAY. 10.2 Inhaler 5   • omeprazole (PRILOSEC) 20 MG delayed-release capsule TAKE 1 CAP BY MOUTH EVERY DAY. 90 Cap 3   • fluticasone (FLONASE) 50 MCG/ACT nasal spray Spray 2 Sprays in nose every day. 16 g 0   • atorvastatin (LIPITOR) 80 MG tablet TAKE 1 TABLET BY MOUTH ONCE DAILY 90 Tab 3   • vitamin D (CHOLECALCIFEROL) 1000 UNIT Tab Take 1,000 Units by mouth every day.     • Calcium Carbonate-Vit D-Min (CALCIUM 1200 PO) Take  by mouth.     • Lactobacillus Rhamnosus, GG, (CULTURELLE PO) Take  by mouth every day.     • aspirin EC (ECOTRIN) 81 MG TBEC Take 1 Tab by mouth every day. 90 Tab 3     No facility-administered encounter medications on file as of 7/31/2018.      Review of Systems   Constitutional: Negative for fever, malaise/fatigue and weight loss.   HENT: Negative for hearing loss.    Respiratory: Negative for shortness of breath.    Cardiovascular: Negative for chest pain and palpitations.   Gastrointestinal: Negative for heartburn, nausea and vomiting.   Musculoskeletal: Positive for back pain.   Skin: Negative for rash.   Neurological: Negative.    Endo/Heme/Allergies: Does not bruise/bleed easily.        Objective:   /80   Pulse 94   Ht 1.524 m (5')   Wt 68.9 kg (152 lb)   SpO2 (!) 56%   BMI 29.69 kg/m²      Physical Exam   Constitutional: She is oriented to person, place, and time. No distress.   Walks with a cane   HENT:   Head: Normocephalic and atraumatic.   Eyes: Pupils are equal, round, and reactive to light. No scleral icterus.   Neck: No JVD present.   Cardiovascular: Normal rate and regular rhythm.    Murmur heard.  Pulmonary/Chest: No respiratory distress. She has no wheezes.   Abdominal: Soft. She exhibits no distension. There is no tenderness.   Musculoskeletal: She exhibits no edema.   Neurological: She is alert and oriented to person, place, and time.   Skin: Skin is warm.   Psychiatric: She has a normal mood and affect.       Assessment:       1. S/P CABG x 5     2. Panlobular emphysema (HCC)     3. Essential hypertension     4. Pulmonary hypertension (HCC)     5. Non-rheumatic mitral regurgitation         Medical Decision Making:  Today's Assessment / Status / Plan:   Status post CABG: She is now nearly 4 years post bypass graft surgery and doing well.  No angina.    Mitral insufficiency: Recent echo personally reviewed.  The mitral insufficiency is moderate severity.    Pulmonary hypertension: Likely secondary to combination of valvular disease and COPD.  Continue current medical regimen.  She is not significantly symptomatic at this point.    Hypertension: Under good control.  Continue current medical regimen.  The lisinopril should help with afterload reduction and hopefully lessen the amount of mitral insufficiency.  Return in 6 months.      Lavon Rogers, SULYP.R.N.  [address] [address]  VIA In Basket

## 2018-10-24 RX ORDER — DILTIAZEM HYDROCHLORIDE 60 MG/1
TABLET, FILM COATED ORAL
Qty: 10.2 INHALER | Refills: 5 | Status: SHIPPED | OUTPATIENT
Start: 2018-10-24 | End: 2019-05-21 | Stop reason: SDUPTHER

## 2018-10-26 DIAGNOSIS — E78.5 DYSLIPIDEMIA: ICD-10-CM

## 2018-10-28 NOTE — TELEPHONE ENCOUNTER
Was the patient seen in the last year in this department? Yes 05/02/18    Does patient have an active prescription for medications requested? No     Received Request Via: Pharmacy

## 2018-10-29 RX ORDER — ATORVASTATIN CALCIUM 80 MG/1
TABLET, FILM COATED ORAL
Qty: 90 TAB | Refills: 3 | Status: SHIPPED | OUTPATIENT
Start: 2018-10-29 | End: 2019-05-21 | Stop reason: SDUPTHER

## 2019-01-03 DIAGNOSIS — R49.0 HOARSENESS OF VOICE: ICD-10-CM

## 2019-01-03 RX ORDER — OMEPRAZOLE 20 MG/1
20 CAPSULE, DELAYED RELEASE ORAL DAILY
Qty: 90 CAP | Refills: 3 | Status: SHIPPED | OUTPATIENT
Start: 2019-01-03 | End: 2020-01-13

## 2019-03-20 DIAGNOSIS — I10 ESSENTIAL HYPERTENSION: ICD-10-CM

## 2019-03-20 RX ORDER — LISINOPRIL 20 MG/1
TABLET ORAL
Qty: 90 TAB | Refills: 0 | Status: SHIPPED | OUTPATIENT
Start: 2019-03-20 | End: 2019-05-21 | Stop reason: SDUPTHER

## 2019-04-26 DIAGNOSIS — I10 ESSENTIAL HYPERTENSION: ICD-10-CM

## 2019-05-21 ENCOUNTER — OFFICE VISIT (OUTPATIENT)
Dept: MEDICAL GROUP | Facility: PHYSICIAN GROUP | Age: 79
End: 2019-05-21
Payer: MEDICARE

## 2019-05-21 ENCOUNTER — HOSPITAL ENCOUNTER (OUTPATIENT)
Dept: LAB | Facility: MEDICAL CENTER | Age: 79
End: 2019-05-21
Attending: NURSE PRACTITIONER
Payer: MEDICARE

## 2019-05-21 VITALS
OXYGEN SATURATION: 93 % | SYSTOLIC BLOOD PRESSURE: 126 MMHG | HEIGHT: 60 IN | BODY MASS INDEX: 30.23 KG/M2 | HEART RATE: 80 BPM | TEMPERATURE: 97.6 F | WEIGHT: 154 LBS | DIASTOLIC BLOOD PRESSURE: 80 MMHG

## 2019-05-21 DIAGNOSIS — J43.1 PANLOBULAR EMPHYSEMA (HCC): ICD-10-CM

## 2019-05-21 DIAGNOSIS — R73.02 IMPAIRED GLUCOSE TOLERANCE: ICD-10-CM

## 2019-05-21 DIAGNOSIS — E78.5 DYSLIPIDEMIA: ICD-10-CM

## 2019-05-21 DIAGNOSIS — I10 ESSENTIAL HYPERTENSION: ICD-10-CM

## 2019-05-21 PROBLEM — R00.1 BRADYCARDIA: Status: RESOLVED | Noted: 2017-05-09 | Resolved: 2019-05-21

## 2019-05-21 LAB
ALBUMIN SERPL BCP-MCNC: 4.1 G/DL (ref 3.2–4.9)
ALBUMIN/GLOB SERPL: 1.6 G/DL
ALP SERPL-CCNC: 61 U/L (ref 30–99)
ALT SERPL-CCNC: 15 U/L (ref 2–50)
ANION GAP SERPL CALC-SCNC: 7 MMOL/L (ref 0–11.9)
AST SERPL-CCNC: 20 U/L (ref 12–45)
BASOPHILS # BLD AUTO: 0.5 % (ref 0–1.8)
BASOPHILS # BLD: 0.04 K/UL (ref 0–0.12)
BILIRUB SERPL-MCNC: 0.5 MG/DL (ref 0.1–1.5)
BUN SERPL-MCNC: 24 MG/DL (ref 8–22)
CALCIUM SERPL-MCNC: 9.8 MG/DL (ref 8.5–10.5)
CHLORIDE SERPL-SCNC: 106 MMOL/L (ref 96–112)
CHOLEST SERPL-MCNC: 154 MG/DL (ref 100–199)
CO2 SERPL-SCNC: 27 MMOL/L (ref 20–33)
CREAT SERPL-MCNC: 1.02 MG/DL (ref 0.5–1.4)
CREAT UR-MCNC: 95 MG/DL
EOSINOPHIL # BLD AUTO: 0.17 K/UL (ref 0–0.51)
EOSINOPHIL NFR BLD: 2 % (ref 0–6.9)
ERYTHROCYTE [DISTWIDTH] IN BLOOD BY AUTOMATED COUNT: 51.8 FL (ref 35.9–50)
FASTING STATUS PATIENT QL REPORTED: NORMAL
GLOBULIN SER CALC-MCNC: 2.5 G/DL (ref 1.9–3.5)
GLUCOSE SERPL-MCNC: 102 MG/DL (ref 65–99)
HCT VFR BLD AUTO: 39.8 % (ref 37–47)
HDLC SERPL-MCNC: 72 MG/DL
HGB BLD-MCNC: 12.9 G/DL (ref 12–16)
IMM GRANULOCYTES # BLD AUTO: 0.01 K/UL (ref 0–0.11)
IMM GRANULOCYTES NFR BLD AUTO: 0.1 % (ref 0–0.9)
LDLC SERPL CALC-MCNC: 69 MG/DL
LYMPHOCYTES # BLD AUTO: 2.92 K/UL (ref 1–4.8)
LYMPHOCYTES NFR BLD: 34.6 % (ref 22–41)
MCH RBC QN AUTO: 31.2 PG (ref 27–33)
MCHC RBC AUTO-ENTMCNC: 32.4 G/DL (ref 33.6–35)
MCV RBC AUTO: 96.1 FL (ref 81.4–97.8)
MICROALBUMIN UR-MCNC: 4.9 MG/DL
MICROALBUMIN/CREAT UR: 52 MG/G (ref 0–30)
MONOCYTES # BLD AUTO: 0.8 K/UL (ref 0–0.85)
MONOCYTES NFR BLD AUTO: 9.5 % (ref 0–13.4)
NEUTROPHILS # BLD AUTO: 4.5 K/UL (ref 2–7.15)
NEUTROPHILS NFR BLD: 53.3 % (ref 44–72)
NRBC # BLD AUTO: 0 K/UL
NRBC BLD-RTO: 0 /100 WBC
PLATELET # BLD AUTO: 236 K/UL (ref 164–446)
PMV BLD AUTO: 11.3 FL (ref 9–12.9)
POTASSIUM SERPL-SCNC: 4.7 MMOL/L (ref 3.6–5.5)
PROT SERPL-MCNC: 6.6 G/DL (ref 6–8.2)
RBC # BLD AUTO: 4.14 M/UL (ref 4.2–5.4)
SODIUM SERPL-SCNC: 140 MMOL/L (ref 135–145)
TRIGL SERPL-MCNC: 63 MG/DL (ref 0–149)
WBC # BLD AUTO: 8.4 K/UL (ref 4.8–10.8)

## 2019-05-21 PROCEDURE — 85025 COMPLETE CBC W/AUTO DIFF WBC: CPT

## 2019-05-21 PROCEDURE — 82570 ASSAY OF URINE CREATININE: CPT

## 2019-05-21 PROCEDURE — 83036 HEMOGLOBIN GLYCOSYLATED A1C: CPT | Mod: GA

## 2019-05-21 PROCEDURE — 82043 UR ALBUMIN QUANTITATIVE: CPT

## 2019-05-21 PROCEDURE — 36415 COLL VENOUS BLD VENIPUNCTURE: CPT

## 2019-05-21 PROCEDURE — 99214 OFFICE O/P EST MOD 30 MIN: CPT | Performed by: NURSE PRACTITIONER

## 2019-05-21 PROCEDURE — 80053 COMPREHEN METABOLIC PANEL: CPT

## 2019-05-21 PROCEDURE — 80061 LIPID PANEL: CPT

## 2019-05-21 RX ORDER — LISINOPRIL 20 MG/1
20 TABLET ORAL
Qty: 90 TAB | Refills: 3 | Status: SHIPPED | OUTPATIENT
Start: 2019-05-21 | End: 2020-06-30

## 2019-05-21 RX ORDER — ALBUTEROL SULFATE 90 UG/1
2 AEROSOL, METERED RESPIRATORY (INHALATION) EVERY 6 HOURS PRN
Qty: 8.5 G | Refills: 3 | Status: SHIPPED | OUTPATIENT
Start: 2019-05-21 | End: 2020-12-15

## 2019-05-21 RX ORDER — ATORVASTATIN CALCIUM 80 MG/1
80 TABLET, FILM COATED ORAL EVERY EVENING
Qty: 90 TAB | Refills: 3 | Status: SHIPPED | OUTPATIENT
Start: 2019-05-21 | End: 2020-07-27 | Stop reason: SDUPTHER

## 2019-05-21 RX ORDER — BUDESONIDE AND FORMOTEROL FUMARATE DIHYDRATE 80; 4.5 UG/1; UG/1
2 AEROSOL RESPIRATORY (INHALATION) 2 TIMES DAILY
Qty: 10.2 INHALER | Refills: 11 | Status: SHIPPED | OUTPATIENT
Start: 2019-05-21 | End: 2019-06-07

## 2019-05-21 NOTE — ASSESSMENT & PLAN NOTE
Chronic problem that is currently managed with Symbicort. She does not use rescue inhaler she does not even have one. Denies sob, dyspnea, cough, wheezing, sputum, or hemoptysis. Denies any chest pain, LE, or THOMAS.     SPIROMETRY:  1.  FVC was 1.61 liters, 71% of predicted.  2.  FEV1 was 1.12 liters, 65% of predicted.  3.  FEV1/FVC ratio was 69%.  4.  There was very good response to bronchodilators with FEV1 increased by 22%.  LUNG VOLUMES:  1.  Total lung capacity was 3.84 liters, 86% of predicted.  2.  Residual volume was 2.03 liters, 94% of predicted.  Diffusion capacity was low normal at 84% of predicted.  IMPRESSION:  The patient has moderate obstructive ventilatory defect with FEV1of 65% predicted and FEV1/FVC ratio was 69%. These findings are consistent with the patient's listed diagnosis of COPD.  Clinical correlation is required.

## 2019-05-21 NOTE — ASSESSMENT & PLAN NOTE
Chronic problem that is well controlled with atorvastatin. No myalgias. She does not want to do labs today because she has not fasted and it is difficult for her to get here for appointments   Ref. Range 4/11/2017 11:30 4/11/2018 11:27   Cholesterol,Tot Latest Ref Range: 100 - 199 mg/dL 176 160   Triglycerides Latest Ref Range: 0 - 149 mg/dL 99 64   HDL Latest Ref Range: >=40 mg/dL 79 62   LDL Latest Ref Range: <100 mg/dL 77 85

## 2019-05-21 NOTE — PROGRESS NOTES
Subjective:     Chief Complaint   Patient presents with   • Medication Management     med refills   • Immunizations     as needed       HPI  Valorie Sierra is a 78 y.o. female here today for immunizations (none due) and medication refills.     Impaired glucose tolerance  Chronic problem that is stable without glucose lowering therapy. Denies any polyuria or polydipsia. She does try to manage her diet, but due to her living and finances, she does eat out most of the time as she lives in a hotel.    Ref. Range 4/18/2017 13:40 11/7/2017 13:21 4/11/2018 11:27   Glycohemoglobin Latest Ref Range: 0.0 - 5.6 % 6.0 6.2 (H) 6.1 (H)   Estim. Avg Glu Latest Units: mg/dL  131 128      Ref. Range 4/18/2017 13:40 11/7/2017 13:21 4/11/2018 11:27   Glycohemoglobin Latest Ref Range: 0.0 - 5.6 % 6.0 6.2 (H) 6.1 (H)       Dyslipidemia  Chronic problem that is well controlled with atorvastatin. No myalgias. She does not want to do labs today because she has not fasted and it is difficult for her to get here for appointments   Ref. Range 4/11/2017 11:30 4/11/2018 11:27   Cholesterol,Tot Latest Ref Range: 100 - 199 mg/dL 176 160   Triglycerides Latest Ref Range: 0 - 149 mg/dL 99 64   HDL Latest Ref Range: >=40 mg/dL 79 62   LDL Latest Ref Range: <100 mg/dL 77 85       COPD (chronic obstructive pulmonary disease)  Chronic problem that is currently managed with Symbicort. She does not use rescue inhaler she does not even have one. Denies sob, dyspnea, cough, wheezing, sputum, or hemoptysis. Denies any chest pain, LE, or THOMAS.     SPIROMETRY:  1.  FVC was 1.61 liters, 71% of predicted.  2.  FEV1 was 1.12 liters, 65% of predicted.  3.  FEV1/FVC ratio was 69%.  4.  There was very good response to bronchodilators with FEV1 increased by 22%.  LUNG VOLUMES:  1.  Total lung capacity was 3.84 liters, 86% of predicted.  2.  Residual volume was 2.03 liters, 94% of predicted.  Diffusion capacity was low normal at 84% of predicted.  IMPRESSION:  The  patient has moderate obstructive ventilatory defect with FEV1of 65% predicted and FEV1/FVC ratio was 69%. These findings are consistent with the patient's listed diagnosis of COPD.  Clinical correlation is required.    Essential hypertension  Chronic issue managed with lisinopril 20 mg daily and metoprolol 12.5 mg BID. bp stable.   Vitals 4/19/2018 7/31/2018 5/21/2019   SYSTOLIC 128 118 126   DIASTOLIC 76 80 80   PULSE 51 94 80       BMI 30.0-30.9,adult  Diet poor d/t financial restrictions and lack of availability to cook. Lifestyle sedentary. Weight does not increase though remains stable       Diagnoses of Impaired glucose tolerance, Essential hypertension, Dyslipidemia, Panlobular emphysema (HCC), and BMI 30.0-30.9,adult were pertinent to this visit.    Allergies: Iodine; Oxycodone-acetaminophen; and Sulfa drugs  Current medicines (including changes today)  Current Outpatient Prescriptions   Medication Sig Dispense Refill   • atorvastatin (LIPITOR) 80 MG tablet Take 1 Tab by mouth every evening. 90 Tab 3   • lisinopril (PRINIVIL) 20 MG Tab Take 1 Tab by mouth every day. 90 Tab 3   • metoprolol (LOPRESSOR) 25 MG Tab Take 0.5 Tabs by mouth 2 times a day. 90 Tab 3   • budesonide-formoterol (SYMBICORT) 80-4.5 MCG/ACT Aerosol Inhale 2 Puffs by mouth 2 times a day. 10.2 Inhaler 11   • albuterol 108 (90 Base) MCG/ACT Aero Soln inhalation aerosol Inhale 2 Puffs by mouth every 6 hours as needed for Shortness of Breath. 8.5 g 3   • omeprazole (PRILOSEC) 20 MG delayed-release capsule TAKE 1 CAP BY MOUTH EVERY DAY. 90 Cap 3   • furosemide (LASIX) 20 MG Tab Take 1/2 tablet 3 times a week 45 Tab 0   • fluticasone (FLONASE) 50 MCG/ACT nasal spray Spray 2 Sprays in nose every day. 16 g 0   • vitamin D (CHOLECALCIFEROL) 1000 UNIT Tab Take 1,000 Units by mouth every day.     • Calcium Carbonate-Vit D-Min (CALCIUM 1200 PO) Take  by mouth.     • Lactobacillus Rhamnosus, GG, (CULTURELLE PO) Take  by mouth every day.     • aspirin EC  (ECOTRIN) 81 MG TBEC Take 1 Tab by mouth every day. 90 Tab 3     No current facility-administered medications for this visit.        She  has a past medical history of Anemia; Anxiety; Arrhythmia; Arthritis; Blood transfusion, without reported diagnosis; CHF (congestive heart failure) (HCC); Chronic airway obstruction, not elsewhere classified; Depression; Emphysema; GERD (gastroesophageal reflux disease); Headache, classical migraine; Heart attack (HCC); Heart murmur; Hyperlipidemia; Hypertension; IBD (inflammatory bowel disease); Kidney disease; Ulcer; and Unspecified cataract.        ROS  As stated in HPI and additionally  Gen: +fatigue. No insomnia, weight loss/gain  Neuro; No unusual headache, dizziness  CV: No chest pain, THOMAS, palpitations, LE edema, syncope  GI: No abd pain, N/V     Objective:     /80 (BP Location: Left arm, Patient Position: Sitting)   Pulse 80   Temp 36.4 °C (97.6 °F) (Temporal)   Ht 1.524 m (5')   Wt 69.9 kg (154 lb)   SpO2 93%  Body mass index is 30.08 kg/m².  Physical Exam:  General: Alert, oriented, in no acute distress.  Eye contact is good, speech goal directed, affect calm  CNs grossly intact.  HEENT: conjunctiva non-injected, sclera non-icteric, EOMs intact.  No lid edema or eye drainage.   Gross hearing intact.  Oral mucous membranes pink and moist with no lesions. Oropharynx without erythema, or exudate.   Neck: Supple. No adenopathy or masses in the cervical or supraclavicular regions  Lungs: unlabored. clear to auscultation bilaterally with good excursion with only 2 faint expiratory wheezes heard  CV: regular rate and rhythm.   Ext: no edema, normal color and temperature.   Skin: No rashes or lesions in visible areas  Gait steady.     Assessment and Plan:   Assessment/Plan:  1. Impaired glucose tolerance  Stable. Monitor A1c annually due now   - HEMOGLOBIN A1C; Future  - MICROALBUMIN CREAT RATIO URINE; Future  - Comp Metabolic Panel; Future    2. Essential  hypertension  Stable on medication regimen   - lisinopril (PRINIVIL) 20 MG Tab; Take 1 Tab by mouth every day.  Dispense: 90 Tab; Refill: 3  - metoprolol (LOPRESSOR) 25 MG Tab; Take 0.5 Tabs by mouth 2 times a day.  Dispense: 90 Tab; Refill: 3  - Comp Metabolic Panel; Future    3. Dyslipidemia  Stable. Will skip lipid panel this year as she is not fasting today and cannot get ride back for labs  - atorvastatin (LIPITOR) 80 MG tablet; Take 1 Tab by mouth every evening.  Dispense: 90 Tab; Refill: 3    4. Panlobular emphysema (HCC)  Stable on symbicort.   - CBC WITH DIFFERENTIAL; Future  - budesonide-formoterol (SYMBICORT) 80-4.5 MCG/ACT Aerosol; Inhale 2 Puffs by mouth 2 times a day.  Dispense: 10.2 Inhaler; Refill: 11  - albuterol 108 (90 Base) MCG/ACT Aero Soln inhalation aerosol; Inhale 2 Puffs by mouth every 6 hours as needed for Shortness of Breath.  Dispense: 8.5 g; Refill: 3    5. BMI 30.0-30.9,adult  - Patient identified as having weight management issue.  Appropriate orders and counseling given.       Follow up:  Return establish .    Educated in proper administration of medication(s) ordered today including safety, possible SE, risks, benefits, rationale and alternatives to therapy.   Supportive care, differential diagnoses, and indications for immediate follow-up discussed with patient.    Pathogenesis of diagnosis discussed including typical length and natural progression.    Instructed to return to clinic or nearest emergency department for any change in condition, further concerns, or worsening of symptoms.  Patient states understanding of the plan of care and discharge instructions.      Please note that this dictation was created using voice recognition software. I have made every reasonable attempt to correct obvious errors, but I expect that there are errors of grammar and possibly content that I did not discover before finalizing the note.    Followup: Return establish . sooner should new symptoms  or problems arise.

## 2019-05-21 NOTE — ASSESSMENT & PLAN NOTE
Chronic problem that is stable without glucose lowering therapy. Denies any polyuria or polydipsia. She does try to manage her diet, but due to her living and finances, she does eat out most of the time as she lives in a hotel.    Ref. Range 4/18/2017 13:40 11/7/2017 13:21 4/11/2018 11:27   Glycohemoglobin Latest Ref Range: 0.0 - 5.6 % 6.0 6.2 (H) 6.1 (H)   Estim. Avg Glu Latest Units: mg/dL  131 128      Ref. Range 4/18/2017 13:40 11/7/2017 13:21 4/11/2018 11:27   Glycohemoglobin Latest Ref Range: 0.0 - 5.6 % 6.0 6.2 (H) 6.1 (H)

## 2019-05-21 NOTE — ASSESSMENT & PLAN NOTE
Chronic issue managed with lisinopril 20 mg daily and metoprolol 12.5 mg BID. bp stable.   Vitals 4/19/2018 7/31/2018 5/21/2019   SYSTOLIC 128 118 126   DIASTOLIC 76 80 80   PULSE 51 94 80

## 2019-05-21 NOTE — ASSESSMENT & PLAN NOTE
Diet poor d/t financial restrictions and lack of availability to cook. Lifestyle sedentary. Weight does not increase though remains stable

## 2019-05-22 LAB
EST. AVERAGE GLUCOSE BLD GHB EST-MCNC: 134 MG/DL
HBA1C MFR BLD: 6.3 % (ref 0–5.6)

## 2019-06-07 ENCOUNTER — OFFICE VISIT (OUTPATIENT)
Dept: MEDICAL GROUP | Facility: PHYSICIAN GROUP | Age: 79
End: 2019-06-07
Payer: MEDICARE

## 2019-06-07 VITALS
HEART RATE: 81 BPM | DIASTOLIC BLOOD PRESSURE: 70 MMHG | TEMPERATURE: 97.4 F | OXYGEN SATURATION: 94 % | WEIGHT: 156 LBS | SYSTOLIC BLOOD PRESSURE: 138 MMHG | BODY MASS INDEX: 30.63 KG/M2 | HEIGHT: 60 IN

## 2019-06-07 DIAGNOSIS — J43.1 PANLOBULAR EMPHYSEMA (HCC): ICD-10-CM

## 2019-06-07 DIAGNOSIS — I10 ESSENTIAL HYPERTENSION: ICD-10-CM

## 2019-06-07 DIAGNOSIS — E78.5 DYSLIPIDEMIA: ICD-10-CM

## 2019-06-07 DIAGNOSIS — I50.32 CHRONIC DIASTOLIC CONGESTIVE HEART FAILURE (HCC): ICD-10-CM

## 2019-06-07 DIAGNOSIS — N18.30 CKD (CHRONIC KIDNEY DISEASE) STAGE 3, GFR 30-59 ML/MIN (HCC): ICD-10-CM

## 2019-06-07 PROCEDURE — 99214 OFFICE O/P EST MOD 30 MIN: CPT | Performed by: FAMILY MEDICINE

## 2019-06-07 RX ORDER — DILTIAZEM HYDROCHLORIDE 60 MG/1
TABLET, FILM COATED ORAL
Qty: 1 INHALER | Refills: 0 | Status: SHIPPED | OUTPATIENT
Start: 2019-06-07 | End: 2020-01-30

## 2019-06-07 NOTE — PROGRESS NOTES
CC:Diagnoses of Essential hypertension, Dyslipidemia, Panlobular emphysema (HCC), Chronic diastolic congestive heart failure (HCC), and CKD (chronic kidney disease) stage 3, GFR 30-59 ml/min ([facility]) were pertinent to this visit.    HISTORY OF PRESENT ILLNESS: Patient is a 78 y.o. female patient who presents today to discuss her COPD and albuterol inhaler which was recently prescribed to her.  She is also establishing care.  He is to follow-up with Lavon Rogers.        COPD (chronic obstructive pulmonary disease)  Chronic medical diagnosis.  Continues to take Symbicort 2 puffs twice a day.  Recently started on albuterol as needed.  Oxygen saturation is stable at 94% on room air.  Does not complain of shortness of breath but does complain of occasional cough.    Diastolic CHF  Chronic medical problem for which she continues to follow-up with cardiology, Dr. Stanley.  Previous appointment with him was in July 2018.  Unfortunately she missed her six-month follow-up and is in the process of getting that scheduled.  Chronic medical problem.    Dyslipidemia  Chronic medical condition for which she is currently taking atorvastatin 80 mg daily.      Essential hypertension  Chronic medical diagnosis for which she continues to take Lasix 20 mg,Lisinopril 20 mg, and metoprolol 12.5 mg daily.  Blood pressure today is 138/70.  Denies any chest pain headaches.    CKD (chronic kidney disease) stage 3, GFR 30-59 ml/min ([facility])  Chronic medical problem.  Denies any hematuria or dysuria.  Most recent labs from May 2019 do show GFR 52 and microalbumin creatinine ratio of 52.      Patient Active Problem List    Diagnosis Date Noted   • Dyslipidemia 09/23/2014     Priority: High   • Essential hypertension 09/23/2014     Priority: High   • CKD (chronic kidney disease) stage 3, GFR 30-59 ml/min ([facility]) 06/07/2019   • BMI 30.0-30.9,adult 05/21/2019   • Pulmonary hypertension ([facility]) 07/31/2018   • Mitral insufficiency 07/31/2018   • Mild depression  ([facility]) 04/19/2018   • Risk for falls 04/18/2017   • Financial difficulties 04/18/2017   • Impaired glucose tolerance 04/18/2017   • Osteopenia 11/01/2016   • Coronary artery disease involving native coronary artery of native heart without angina pectoris 04/28/2016   • Compression fracture of vertebrae ([facility]) 12/29/2014   • COPD (chronic obstructive pulmonary disease) ([facility]) 09/23/2014   • Diastolic CHF ([facility]) 09/10/2014      Allergies:Iodine; Oxycodone-acetaminophen; and Sulfa drugs    Current Outpatient Prescriptions   Medication Sig Dispense Refill   • SYMBICORT 80-4.5 MCG/ACT Aerosol INHALE 2 PUFFS BY MOUTH 2 TIMES A DAY. 1 Inhaler 0   • atorvastatin (LIPITOR) 80 MG tablet Take 1 Tab by mouth every evening. 90 Tab 3   • lisinopril (PRINIVIL) 20 MG Tab Take 1 Tab by mouth every day. 90 Tab 3   • metoprolol (LOPRESSOR) 25 MG Tab Take 0.5 Tabs by mouth 2 times a day. 90 Tab 3   • omeprazole (PRILOSEC) 20 MG delayed-release capsule TAKE 1 CAP BY MOUTH EVERY DAY. 90 Cap 3   • furosemide (LASIX) 20 MG Tab Take 1/2 tablet 3 times a week 45 Tab 0   • Calcium Carbonate-Vit D-Min (CALCIUM 1200 PO) Take  by mouth.     • Lactobacillus Rhamnosus, GG, (CULTURELLE PO) Take  by mouth every day.     • aspirin EC (ECOTRIN) 81 MG TBEC Take 1 Tab by mouth every day. 90 Tab 3   • albuterol 108 (90 Base) MCG/ACT Aero Soln inhalation aerosol Inhale 2 Puffs by mouth every 6 hours as needed for Shortness of Breath. 8.5 g 3     No current facility-administered medications for this visit.        Social History   Substance Use Topics   • Smoking status: Former Smoker     Packs/day: 3.00     Years: 55.00     Types: Cigarettes     Quit date: 8/29/2007   • Smokeless tobacco: Never Used   • Alcohol use No      Comment:      Social History     Social History Narrative   • No narrative on file       Family History   Problem Relation Age of Onset   • Stroke Mother    • Hyperlipidemia Mother    • Hypertension Mother    • Lung Disease  Mother         smoker   • Heart Disease Father    • Hypertension Father    • Hyperlipidemia Father    • Lung Disease Father         smoker   • Alcohol/Drug Father         etoh   • Other Father         aneurysm   • Genetic Maternal Aunt         Parkinsons   • Heart Disease Paternal Aunt    • Hypertension Paternal Aunt    • Hyperlipidemia Paternal Aunt    • Psychiatry Paternal Aunt         dementia   • Lung Disease Paternal Aunt         smoker   • Heart Disease Paternal Uncle    • Hypertension Paternal Uncle    • Hyperlipidemia Paternal Uncle    • Arthritis Maternal Grandmother         osteoporosis   • Lung Disease Maternal Grandmother         smoker   • Heart Disease Paternal Grandmother    • Hypertension Paternal Grandmother    • Hyperlipidemia Paternal Grandmother    • Stroke Paternal Grandmother         dvt       Review of Systems:      - Constitutional: Negative for fever, chills, fatigue    - HEENT: Negative for sinus congestion or sore throat    - Respiratory: Negative for cough or SOB    - Cardiovascular: Negative for chest pain, palpitations, LE edema    - Gastrointestinal: Negative for heartburn, nausea, vomiting, abdominal pain,     - Genitourinary: Negative for dysuria,    - Musculoskeletal: Negative for myalgias, back pain, and joint pain.     - Skin: Negative for rash, itching,    - Neurological: Negative for dizziness, tremors, headaches    - Endo/Heme/Allergies: Does not bruise/bleed easily.     - Psychiatric/Behavioral: Negative for depression or anxiety    Exam:    Vitals: /70 (BP Location: Right arm, Patient Position: Sitting, BP Cuff Size: Adult)   Pulse 81   Temp 36.3 °C (97.4 °F) (Temporal)   Ht 1.524 m (5')   Wt 70.8 kg (156 lb)   SpO2 94%   BMI 30.47 kg/m²   General:  Alert, pleasant, NAD  Eyes:  normal inspection of conjunctivae and lids, EOMI,   ENMT:  External ears and nose are normal.   Neck  supple,   Heart:  Regular rate and rhythm,  No LE edema  Respiratory:  Normal  respiratory effort, Clear to auscultation bilaterally.  Abdomen:   soft, Non-distended,   Skin:  Warm, dry, no rashes,   Musculoskeletal: Ambulates with cane., Normal digits and nails.  Neurological: No tremors,   Psych:   Affect/mood is normal, judgement is good, memory is intact, grooming is appropriate.    Assessment/Plan:  1. Essential hypertension  Chronic medical condition.  Stable with meds.    2. Dyslipidemia  Chronic medical condition.  Stable with Lipitor.    3. Panlobular emphysema (HCC)  Chronic medical condition.  Stable on room air.  Continue with Symbicort.  Continue with albuterol as needed.    4. Chronic diastolic congestive heart failure (HCC)  Chronic medical condition.  Stable with meds.    5. CKD (chronic kidney disease) stage 3, GFR 30-59 ml/min (HCC)  Chronic medical condition.  We will continue to monitor.        Return in about 4 months (around 10/7/2019) for f/u Hypertension.      This note was created using voice recognition software (Dragon). The accuracy of the dictation is limited by the abilities of the software. I have reviewed the note prior to signing, however some errors in grammar and context are still possible. If you have any questions related to this note please do not hesitate to contact our office.

## 2019-06-07 NOTE — ASSESSMENT & PLAN NOTE
Chronic medical diagnosis for which she continues to take Lasix 20 mg,Lisinopril 20 mg, and metoprolol 12.5 mg daily.  Blood pressure today is 138/70.  Denies any chest pain headaches.

## 2019-06-07 NOTE — ASSESSMENT & PLAN NOTE
Chronic medical diagnosis.  Continues to take Symbicort 2 puffs twice a day.  Recently started on albuterol as needed.  Oxygen saturation is stable at 94% on room air.  Does not complain of shortness of breath but does complain of occasional cough.

## 2019-06-07 NOTE — ASSESSMENT & PLAN NOTE
Chronic medical problem.  Denies any hematuria or dysuria.  Most recent labs from May 2019 do show GFR 52 and microalbumin creatinine ratio of 52.

## 2019-06-07 NOTE — ASSESSMENT & PLAN NOTE
Chronic medical problem for which she continues to follow-up with cardiology, Dr. Stanley.  Previous appointment with him was in October 2018.  Unfortunately she missed her six-month follow-up and is in the process of getting that scheduled.  Chronic medical problem.

## 2019-09-05 ENCOUNTER — OFFICE VISIT (OUTPATIENT)
Dept: CARDIOLOGY | Facility: MEDICAL CENTER | Age: 79
End: 2019-09-05
Payer: MEDICARE

## 2019-09-05 VITALS
HEART RATE: 68 BPM | WEIGHT: 155 LBS | DIASTOLIC BLOOD PRESSURE: 66 MMHG | OXYGEN SATURATION: 94 % | BODY MASS INDEX: 30.43 KG/M2 | HEIGHT: 60 IN | SYSTOLIC BLOOD PRESSURE: 144 MMHG

## 2019-09-05 DIAGNOSIS — E78.5 DYSLIPIDEMIA: ICD-10-CM

## 2019-09-05 DIAGNOSIS — I27.20 PULMONARY HYPERTENSION (HCC): ICD-10-CM

## 2019-09-05 DIAGNOSIS — I25.10 CORONARY ARTERY DISEASE INVOLVING NATIVE CORONARY ARTERY OF NATIVE HEART WITHOUT ANGINA PECTORIS: ICD-10-CM

## 2019-09-05 DIAGNOSIS — Z95.1 HX OF CABG: ICD-10-CM

## 2019-09-05 PROCEDURE — 99214 OFFICE O/P EST MOD 30 MIN: CPT | Performed by: INTERNAL MEDICINE

## 2019-09-05 ASSESSMENT — ENCOUNTER SYMPTOMS
HEARTBURN: 0
NAUSEA: 0
SHORTNESS OF BREATH: 0
WEIGHT LOSS: 0
PALPITATIONS: 0
VOMITING: 0
BRUISES/BLEEDS EASILY: 0
CONSTITUTIONAL NEGATIVE: 1
CARDIOVASCULAR NEGATIVE: 1
GASTROINTESTINAL NEGATIVE: 1
FEVER: 0
NEUROLOGICAL NEGATIVE: 1
MUSCULOSKELETAL NEGATIVE: 1
RESPIRATORY NEGATIVE: 1

## 2019-09-05 NOTE — PROGRESS NOTES
Chief Complaint   Patient presents with   • Coronary Artery Bypass Graft (CABG)       Subjective:   Valorie Sierra is a 79 y.o. female who presents today for history of coronary disease and follow-up of bypass graft surgery in September, 2014.  She is had no angina at all.  Lab from May was reviewed and her LDL is at target.  She also has a history of pulmonary hypertension presently secondary to lung disease from smoking.  By most recent echo EF is normal but pulmonary pressure 65.    The patient has had no chest pain or palpitations.  She had mild edema in the summer that resolved.  She is not to physically active.  Patient complains of hypersomnolence and sleeps a lot.  She is having some stresses on the home front with her son who lives with her.    Past Medical History:   Diagnosis Date   • Anemia    • Anxiety    • Arrhythmia    • Arthritis    • Blood transfusion, without reported diagnosis    • CHF (congestive heart failure) (HCC)    • Chronic airway obstruction, not elsewhere classified    • Depression    • Emphysema    • GERD (gastroesophageal reflux disease)    • Headache, classical migraine    • Heart attack (HCC)    • Heart murmur    • Hyperlipidemia    • Hypertension    • IBD (inflammatory bowel disease)    • Kidney disease    • Ulcer    • Unspecified cataract      Past Surgical History:   Procedure Laterality Date   • MULTIPLE CORONARY ARTERY BYPASS ENDO VEIN HARVEST  9/1/2014    Performed by Stephen Nowak M.D. at [facility]   • BLADDER NECK CONTRACTURE EXICISION  1970   • GANGLION EXCISION  1969    left wrist   • ABDOMINAL EXPLORATION     • ABDOMINAL HYSTERECTOMY TOTAL      fibroids   • APPENDECTOMY     • DENTAL EXTRACTION(S)       Family History   Problem Relation Age of Onset   • Stroke Mother    • Hyperlipidemia Mother    • Hypertension Mother    • Lung Disease Mother         smoker   • Heart Disease Father    • Hypertension Father    • Hyperlipidemia Father    • Lung Disease  Father         smoker   • Alcohol/Drug Father         etoh   • Other Father         aneurysm   • Genetic Disorder Maternal Aunt         Parkinsons   • Heart Disease Paternal Aunt    • Hypertension Paternal Aunt    • Hyperlipidemia Paternal Aunt    • Psychiatric Illness Paternal Aunt         dementia   • Lung Disease Paternal Aunt         smoker   • Heart Disease Paternal Uncle    • Hypertension Paternal Uncle    • Hyperlipidemia Paternal Uncle    • Arthritis Maternal Grandmother         osteoporosis   • Lung Disease Maternal Grandmother         smoker   • Heart Disease Paternal Grandmother    • Hypertension Paternal Grandmother    • Hyperlipidemia Paternal Grandmother    • Stroke Paternal Grandmother         dvt     Social History     Socioeconomic History   • Marital status:      Spouse name: Not on file   • Number of children: Not on file   • Years of education: Not on file   • Highest education level: Not on file   Occupational History   • Not on file   Social Needs   • Financial resource strain: Not on file   • Food insecurity:     Worry: Not on file     Inability: Not on file   • Transportation needs:     Medical: Not on file     Non-medical: Not on file   Tobacco Use   • Smoking status: Former Smoker     Packs/day: 3.00     Years: 55.00     Pack years: 165.00     Types: Cigarettes     Last attempt to quit: 2007     Years since quittin.0   • Smokeless tobacco: Never Used   Substance and Sexual Activity   • Alcohol use: No     Alcohol/week: 0.0 oz     Comment:    • Drug use: No   • Sexual activity: Never     Comment: lives with son in a motel, dtr  in , ,    Lifestyle   • Physical activity:     Days per week: Not on file     Minutes per session: Not on file   • Stress: Not on file   Relationships   • Social connections:     Talks on phone: Not on file     Gets together: Not on file     Attends [employer] service: Not on file     Active member of club or organization:  Not on file     Attends meetings of clubs or organizations: Not on file     Relationship status: Not on file   • Intimate partner violence:     Fear of current or ex partner: Not on file     Emotionally abused: Not on file     Physically abused: Not on file     Forced sexual activity: Not on file   Other Topics Concern   • Not on file   Social History Narrative   • Not on file     Allergies   Allergen Reactions   • Iodine Itching   • Oxycodone-Acetaminophen Hives   • Sulfa Drugs Vomiting     Outpatient Encounter Medications as of 9/5/2019   Medication Sig Dispense Refill   • SYMBICORT 80-4.5 MCG/ACT Aerosol INHALE 2 PUFFS BY MOUTH 2 TIMES A DAY. 1 Inhaler 0   • atorvastatin (LIPITOR) 80 MG tablet Take 1 Tab by mouth every evening. 90 Tab 3   • lisinopril (PRINIVIL) 20 MG Tab Take 1 Tab by mouth every day. 90 Tab 3   • metoprolol (LOPRESSOR) 25 MG Tab Take 0.5 Tabs by mouth 2 times a day. 90 Tab 3   • albuterol 108 (90 Base) MCG/ACT Aero Soln inhalation aerosol Inhale 2 Puffs by mouth every 6 hours as needed for Shortness of Breath. 8.5 g 3   • omeprazole (PRILOSEC) 20 MG delayed-release capsule TAKE 1 CAP BY MOUTH EVERY DAY. 90 Cap 3   • furosemide (LASIX) 20 MG Tab Take 1/2 tablet 3 times a week 45 Tab 0   • Calcium Carbonate-Vit D-Min (CALCIUM 1200 PO) Take  by mouth.     • Lactobacillus Rhamnosus, GG, (CULTURELLE PO) Take  by mouth every day.     • aspirin EC (ECOTRIN) 81 MG TBEC Take 1 Tab by mouth every day. 90 Tab 3     No facility-administered encounter medications on file as of 9/5/2019.      Review of Systems   Constitutional: Negative.  Negative for fever, malaise/fatigue and weight loss.   HENT: Negative.  Negative for hearing loss.    Respiratory: Negative.  Negative for shortness of breath.    Cardiovascular: Negative.  Negative for chest pain and palpitations.   Gastrointestinal: Negative.  Negative for heartburn, nausea and vomiting.   Musculoskeletal: Negative.    Skin: Negative.  Negative for rash.    Neurological: Negative.    Endo/Heme/Allergies: Negative.  Does not bruise/bleed easily.   Psychiatric/Behavioral:        Patient states she is sleeping a lot.  She has frequent conflicts with her son who lives with her over air conditioning etc.        Objective:   /66 (BP Location: Left arm, Patient Position: Sitting, BP Cuff Size: Adult)   Pulse 68   Ht 1.524 m (5')   Wt 70.3 kg (155 lb)   SpO2 94%   BMI 30.27 kg/m²     Physical Exam   Constitutional: She is oriented to person, place, and time. No distress.   HENT:   Head: Normocephalic and atraumatic.   Eyes: Pupils are equal, round, and reactive to light. Conjunctivae are normal. No scleral icterus.   Neck: No JVD present.   Cardiovascular: Normal rate and regular rhythm.   Murmur heard.  Pulmonary/Chest: No respiratory distress. She has no wheezes.   Abdominal: Soft. She exhibits no distension. There is no tenderness.   Musculoskeletal: She exhibits no edema.   Neurological: She is alert and oriented to person, place, and time.   Skin: Skin is warm.   Psychiatric: She has a normal mood and affect.       Assessment:     1. Pulmonary hypertension (HCC)     2. Dyslipidemia     3. Coronary artery disease involving native coronary artery of native heart without angina pectoris     4. Hx of CABG         Medical Decision Making:  Today's Assessment / Status / Plan:   Status post CABG: She is now 5 years post bypass.  No heart failure on exam.  No angina.    Hyperlipidemia: LDL is at target.    Hypertension: Controlled on current medications.    Pulmonary hypertension: No evidence of cor pulmonale.  Suspect her pulmonary hypertension is secondary to underlying COPD from smoking.  Could also be from diastolic dysfunction.  Continue same medications.  She is doing well on thrice weekly diuretic.    Hypersomnolence: This may be secondary to depression and stress.    From a cardiac standpoint she is doing well.  Return one year

## 2019-09-11 DIAGNOSIS — R60.9 EDEMA, UNSPECIFIED TYPE: ICD-10-CM

## 2019-09-12 RX ORDER — FUROSEMIDE 20 MG/1
TABLET ORAL
Qty: 45 TAB | Refills: 3 | Status: SHIPPED | OUTPATIENT
Start: 2019-09-12 | End: 2019-10-01

## 2019-10-01 ENCOUNTER — OFFICE VISIT (OUTPATIENT)
Dept: MEDICAL GROUP | Facility: PHYSICIAN GROUP | Age: 79
End: 2019-10-01
Payer: MEDICARE

## 2019-10-01 VITALS
HEIGHT: 60 IN | WEIGHT: 159 LBS | DIASTOLIC BLOOD PRESSURE: 62 MMHG | HEART RATE: 60 BPM | TEMPERATURE: 97.3 F | OXYGEN SATURATION: 95 % | RESPIRATION RATE: 16 BRPM | BODY MASS INDEX: 31.22 KG/M2 | SYSTOLIC BLOOD PRESSURE: 124 MMHG

## 2019-10-01 DIAGNOSIS — N18.30 CKD (CHRONIC KIDNEY DISEASE) STAGE 3, GFR 30-59 ML/MIN (HCC): ICD-10-CM

## 2019-10-01 DIAGNOSIS — I50.32 CHRONIC DIASTOLIC CONGESTIVE HEART FAILURE (HCC): ICD-10-CM

## 2019-10-01 DIAGNOSIS — F32.A MILD DEPRESSION: ICD-10-CM

## 2019-10-01 DIAGNOSIS — J43.1 PANLOBULAR EMPHYSEMA (HCC): ICD-10-CM

## 2019-10-01 DIAGNOSIS — R73.02 IMPAIRED GLUCOSE TOLERANCE: ICD-10-CM

## 2019-10-01 DIAGNOSIS — I25.119 CORONARY ARTERY DISEASE INVOLVING NATIVE CORONARY ARTERY OF NATIVE HEART WITH ANGINA PECTORIS (HCC): ICD-10-CM

## 2019-10-01 DIAGNOSIS — I27.20 PULMONARY HYPERTENSION (HCC): ICD-10-CM

## 2019-10-01 PROBLEM — Z59.9 FINANCIAL DIFFICULTIES: Status: RESOLVED | Noted: 2017-04-18 | Resolved: 2019-10-01

## 2019-10-01 PROCEDURE — 99214 OFFICE O/P EST MOD 30 MIN: CPT | Performed by: FAMILY MEDICINE

## 2019-10-01 RX ORDER — INFLUENZA A VIRUS A/MICHIGAN/45/2015 X-275 (H1N1) ANTIGEN (FORMALDEHYDE INACTIVATED), INFLUENZA A VIRUS A/SINGAPORE/INFIMH-16-0019/2016 IVR-186 (H3N2) ANTIGEN (FORMALDEHYDE INACTIVATED), AND INFLUENZA B VIRUS B/MARYLAND/15/2016 BX-69A (A B/COLORADO/6/2017-LIKE VIRUS) ANTIGEN (FORMALDEHYDE INACTIVATED) 60; 60; 60 UG/.5ML; UG/.5ML; UG/.5ML
INJECTION, SUSPENSION INTRAMUSCULAR
Refills: 0 | COMMUNITY
Start: 2019-09-12 | End: 2019-10-01

## 2019-11-07 ENCOUNTER — HOSPITAL ENCOUNTER (EMERGENCY)
Facility: MEDICAL CENTER | Age: 79
End: 2019-11-07
Attending: EMERGENCY MEDICINE
Payer: MEDICARE

## 2019-11-07 ENCOUNTER — APPOINTMENT (OUTPATIENT)
Dept: RADIOLOGY | Facility: MEDICAL CENTER | Age: 79
End: 2019-11-07
Attending: EMERGENCY MEDICINE
Payer: MEDICARE

## 2019-11-07 VITALS
DIASTOLIC BLOOD PRESSURE: 53 MMHG | OXYGEN SATURATION: 95 % | HEIGHT: 60 IN | SYSTOLIC BLOOD PRESSURE: 183 MMHG | BODY MASS INDEX: 30.43 KG/M2 | RESPIRATION RATE: 16 BRPM | TEMPERATURE: 98 F | HEART RATE: 60 BPM | WEIGHT: 155 LBS

## 2019-11-07 DIAGNOSIS — J20.9 ACUTE BRONCHITIS, UNSPECIFIED ORGANISM: ICD-10-CM

## 2019-11-07 LAB
ALBUMIN SERPL BCP-MCNC: 3.5 G/DL (ref 3.2–4.9)
ALBUMIN/GLOB SERPL: 1.3 G/DL
ALP SERPL-CCNC: 64 U/L (ref 30–99)
ALT SERPL-CCNC: 14 U/L (ref 2–50)
ANION GAP SERPL CALC-SCNC: 9 MMOL/L (ref 0–11.9)
AST SERPL-CCNC: 16 U/L (ref 12–45)
BASOPHILS # BLD AUTO: 0.5 % (ref 0–1.8)
BASOPHILS # BLD: 0.05 K/UL (ref 0–0.12)
BILIRUB SERPL-MCNC: 0.5 MG/DL (ref 0.1–1.5)
BUN SERPL-MCNC: 16 MG/DL (ref 8–22)
CALCIUM SERPL-MCNC: 9.2 MG/DL (ref 8.5–10.5)
CHLORIDE SERPL-SCNC: 105 MMOL/L (ref 96–112)
CO2 SERPL-SCNC: 29 MMOL/L (ref 20–33)
CREAT SERPL-MCNC: 1.11 MG/DL (ref 0.5–1.4)
EOSINOPHIL # BLD AUTO: 0.14 K/UL (ref 0–0.51)
EOSINOPHIL NFR BLD: 1.5 % (ref 0–6.9)
ERYTHROCYTE [DISTWIDTH] IN BLOOD BY AUTOMATED COUNT: 51.4 FL (ref 35.9–50)
FLUAV RNA SPEC QL NAA+PROBE: NEGATIVE
FLUBV RNA SPEC QL NAA+PROBE: NEGATIVE
GLOBULIN SER CALC-MCNC: 2.6 G/DL (ref 1.9–3.5)
GLUCOSE SERPL-MCNC: 119 MG/DL (ref 65–99)
HCT VFR BLD AUTO: 34.3 % (ref 37–47)
HGB BLD-MCNC: 11.1 G/DL (ref 12–16)
IMM GRANULOCYTES # BLD AUTO: 0.04 K/UL (ref 0–0.11)
IMM GRANULOCYTES NFR BLD AUTO: 0.4 % (ref 0–0.9)
LACTATE BLD-SCNC: 1.1 MMOL/L (ref 0.5–2)
LYMPHOCYTES # BLD AUTO: 2.29 K/UL (ref 1–4.8)
LYMPHOCYTES NFR BLD: 24.3 % (ref 22–41)
MCH RBC QN AUTO: 30.5 PG (ref 27–33)
MCHC RBC AUTO-ENTMCNC: 32.4 G/DL (ref 33.6–35)
MCV RBC AUTO: 94.2 FL (ref 81.4–97.8)
MONOCYTES # BLD AUTO: 0.87 K/UL (ref 0–0.85)
MONOCYTES NFR BLD AUTO: 9.2 % (ref 0–13.4)
NEUTROPHILS # BLD AUTO: 6.02 K/UL (ref 2–7.15)
NEUTROPHILS NFR BLD: 64.1 % (ref 44–72)
NRBC # BLD AUTO: 0 K/UL
NRBC BLD-RTO: 0 /100 WBC
NT-PROBNP SERPL IA-MCNC: 871 PG/ML (ref 0–125)
PLATELET # BLD AUTO: 347 K/UL (ref 164–446)
PMV BLD AUTO: 9.4 FL (ref 9–12.9)
POTASSIUM SERPL-SCNC: 4.3 MMOL/L (ref 3.6–5.5)
PROT SERPL-MCNC: 6.1 G/DL (ref 6–8.2)
RBC # BLD AUTO: 3.64 M/UL (ref 4.2–5.4)
SODIUM SERPL-SCNC: 143 MMOL/L (ref 135–145)
WBC # BLD AUTO: 9.4 K/UL (ref 4.8–10.8)

## 2019-11-07 PROCEDURE — 80053 COMPREHEN METABOLIC PANEL: CPT

## 2019-11-07 PROCEDURE — 99284 EMERGENCY DEPT VISIT MOD MDM: CPT

## 2019-11-07 PROCEDURE — 83880 ASSAY OF NATRIURETIC PEPTIDE: CPT

## 2019-11-07 PROCEDURE — 87502 INFLUENZA DNA AMP PROBE: CPT

## 2019-11-07 PROCEDURE — 71045 X-RAY EXAM CHEST 1 VIEW: CPT

## 2019-11-07 PROCEDURE — 83605 ASSAY OF LACTIC ACID: CPT

## 2019-11-07 PROCEDURE — 87040 BLOOD CULTURE FOR BACTERIA: CPT | Mod: 91

## 2019-11-07 PROCEDURE — 85025 COMPLETE CBC W/AUTO DIFF WBC: CPT

## 2019-11-07 RX ORDER — AZITHROMYCIN 250 MG/1
TABLET, FILM COATED ORAL
Qty: 6 TAB | Refills: 0 | Status: SHIPPED | OUTPATIENT
Start: 2019-11-07 | End: 2020-01-08

## 2019-11-07 NOTE — ED TRIAGE NOTES
Patient presents via EMS from home with  for a cough for the past month. Yellow phlegm reported. Patient states she feels warm at home but no fever noted. Pt A&Ox4 and ambulatory with cane.   Hx of HTN asthma and emphysema. BP currently 200/47 - states she takes medication as rx but does not know what it normal is.

## 2019-11-08 NOTE — DISCHARGE INSTRUCTIONS

## 2019-11-08 NOTE — ED NOTES
Break RN: Patient hypertensive, has not taken 1600 home BP medication, ERP aware, ok for discharge, 95% on RA, PIV removed, patient given discharge instructions, follow up information, and prescription x 1, discharged to son.

## 2019-11-12 LAB
BACTERIA BLD CULT: NORMAL
BACTERIA BLD CULT: NORMAL
SIGNIFICANT IND 70042: NORMAL
SIGNIFICANT IND 70042: NORMAL
SITE SITE: NORMAL
SITE SITE: NORMAL
SOURCE SOURCE: NORMAL
SOURCE SOURCE: NORMAL

## 2020-01-08 ENCOUNTER — HOSPITAL ENCOUNTER (OUTPATIENT)
Dept: LAB | Facility: MEDICAL CENTER | Age: 80
End: 2020-01-08
Attending: FAMILY MEDICINE
Payer: MEDICARE

## 2020-01-08 ENCOUNTER — OFFICE VISIT (OUTPATIENT)
Dept: MEDICAL GROUP | Facility: PHYSICIAN GROUP | Age: 80
End: 2020-01-08
Payer: MEDICARE

## 2020-01-08 VITALS
TEMPERATURE: 98.6 F | HEIGHT: 60 IN | OXYGEN SATURATION: 97 % | RESPIRATION RATE: 16 BRPM | DIASTOLIC BLOOD PRESSURE: 84 MMHG | WEIGHT: 146 LBS | BODY MASS INDEX: 28.66 KG/M2 | HEART RATE: 62 BPM | SYSTOLIC BLOOD PRESSURE: 130 MMHG

## 2020-01-08 DIAGNOSIS — N18.30 CKD (CHRONIC KIDNEY DISEASE) STAGE 3, GFR 30-59 ML/MIN: ICD-10-CM

## 2020-01-08 DIAGNOSIS — R73.02 IMPAIRED GLUCOSE TOLERANCE: ICD-10-CM

## 2020-01-08 DIAGNOSIS — J43.1 PANLOBULAR EMPHYSEMA (HCC): ICD-10-CM

## 2020-01-08 DIAGNOSIS — I10 ESSENTIAL HYPERTENSION: ICD-10-CM

## 2020-01-08 DIAGNOSIS — E78.5 DYSLIPIDEMIA: ICD-10-CM

## 2020-01-08 LAB
ANION GAP SERPL CALC-SCNC: 11 MMOL/L (ref 0–11.9)
BUN SERPL-MCNC: 24 MG/DL (ref 8–22)
CALCIUM SERPL-MCNC: 9.2 MG/DL (ref 8.5–10.5)
CHLORIDE SERPL-SCNC: 104 MMOL/L (ref 96–112)
CO2 SERPL-SCNC: 25 MMOL/L (ref 20–33)
CREAT SERPL-MCNC: 0.95 MG/DL (ref 0.5–1.4)
GLUCOSE SERPL-MCNC: 94 MG/DL (ref 65–99)
HBA1C MFR BLD: 5.9 % (ref 0–5.6)
INT CON NEG: NEGATIVE
INT CON POS: POSITIVE
POTASSIUM SERPL-SCNC: 4 MMOL/L (ref 3.6–5.5)
SODIUM SERPL-SCNC: 140 MMOL/L (ref 135–145)

## 2020-01-08 PROCEDURE — 82570 ASSAY OF URINE CREATININE: CPT

## 2020-01-08 PROCEDURE — 99214 OFFICE O/P EST MOD 30 MIN: CPT | Performed by: FAMILY MEDICINE

## 2020-01-08 PROCEDURE — 82043 UR ALBUMIN QUANTITATIVE: CPT

## 2020-01-08 PROCEDURE — 36415 COLL VENOUS BLD VENIPUNCTURE: CPT

## 2020-01-08 PROCEDURE — 80048 BASIC METABOLIC PNL TOTAL CA: CPT

## 2020-01-08 PROCEDURE — 83036 HEMOGLOBIN GLYCOSYLATED A1C: CPT | Performed by: FAMILY MEDICINE

## 2020-01-08 NOTE — PROGRESS NOTES
cc: Hospital follow-up and lab results discussion    Subjective:     Valorie Sierra is a 79 y.o. female presenting to discuss her previous lab results    1. Essential hypertension  Chronic medical diagnosis.  Blood pressure today is 130/84.  Denies any headaches or chest pain.  Continues to take Lasix 20 mg and lisinopril 20 mg daily.  Also taking Lopressor 12.5 mg twice a day.    2. Dyslipidemia  Chronic medical diagnosis.  Currently taking atorvastatin 80 mg daily.  Recent labs from May 2019 at total cholesterol improved at 154, triglycerides 63, HDL 72, and LDL 69.    3. CKD (chronic kidney disease) stage 3, GFR 30-59 ml/min (HCC)  Chronic medical diagnosis.  Recent GFR from November was decreased at 47.  Denies any dysuria or hematuria.  Denies taking any NSAIDs.  Urine microalbumin creatinine ratio was elevated at 52.    4. Impaired glucose tolerance  Chronic medical diagnosis.  Previous hemoglobin A1c was 6.3%.  On recheck today it is 5.9%.    5. Panlobular emphysema (HCC)  Chronic medical diagnosis.  She was seen in emergency room on November 7 and diagnosed with acute bronchitis.  She was discharged home on Zithromax.  Since then she has recovered and is doing well.  She continues to take Symbicort twice a day daily.  Mention that she has a hard time affording this medication.. Currently denies any shortness of breath or chest pain.      Review of systems:  See above and negative for fever chills.  Allergies   Allergen Reactions   • Iodine Itching   • Oxycodone-Acetaminophen Hives   • Sulfa Drugs Vomiting   • Food      avocado         Current Outpatient Medications:   •  Probiotic Product (PROBIOTIC DAILY PO), Take  by mouth., Disp: , Rfl:   •  SYMBICORT 80-4.5 MCG/ACT Aerosol, INHALE 2 PUFFS BY MOUTH 2 TIMES A DAY., Disp: 1 Inhaler, Rfl: 0  •  atorvastatin (LIPITOR) 80 MG tablet, Take 1 Tab by mouth every evening., Disp: 90 Tab, Rfl: 3  •  lisinopril (PRINIVIL) 20 MG Tab, Take 1 Tab by mouth every day.,  Disp: 90 Tab, Rfl: 3  •  metoprolol (LOPRESSOR) 25 MG Tab, Take 0.5 Tabs by mouth 2 times a day., Disp: 90 Tab, Rfl: 3  •  omeprazole (PRILOSEC) 20 MG delayed-release capsule, TAKE 1 CAP BY MOUTH EVERY DAY., Disp: 90 Cap, Rfl: 3  •  furosemide (LASIX) 20 MG Tab, Take 1/2 tablet 3 times a week, Disp: 45 Tab, Rfl: 0  •  Calcium Carbonate-Vit D-Min (CALCIUM 1200 PO), Take  by mouth., Disp: , Rfl:   •  aspirin EC (ECOTRIN) 81 MG TBEC, Take 1 Tab by mouth every day., Disp: 90 Tab, Rfl: 3  •  albuterol 108 (90 Base) MCG/ACT Aero Soln inhalation aerosol, Inhale 2 Puffs by mouth every 6 hours as needed for Shortness of Breath. (Patient not taking: Reported on 1/8/2020), Disp: 8.5 g, Rfl: 3    Allergies, past medical history, past surgical history, family history, social history reviewed and updated    Objective:     Vitals: /84   Pulse 62   Temp 37 °C (98.6 °F)   Resp 16   Ht 1.524 m (5')   Wt 66.2 kg (146 lb)   SpO2 97%   BMI 28.51 kg/m²   General:  Alert, pleasant, NAD  Eyes:  normal inspection of conjunctivae and lids, EOMI,   ENMT:  External ears and nose are normal.    Neck  supple,   Heart:  Regular rate and rhythm,  No LE edema  Respiratory:  Normal respiratory effort, Clear to auscultation bilaterally.  Abdomen:   soft, Non-distended,   Skin:  Warm, dry, no rashes,   Musculoskeletal:  Normal gait, Normal digits and nails.  Neurological: No tremors,   Psych:  Affect/mood is normal, judgement is good, memory is intact, grooming is appropriate.    Assessment/Plan:     Valorie was seen today for hospital follow-up.    Diagnoses and all orders for this visit:    Essential hypertension  Chronic medical diagnosis.  Stable.  Continue with current meds.  All lab results discussed with patient in detail and explained to her.    Dyslipidemia  Chronic medical diagnosis.  Stable.  Continue with Lipitor.    CKD (chronic kidney disease) stage 3, GFR 30-59 ml/min ([facility])  Chronic medical diagnosis.  We will recheck  labs.  -     Basic Metabolic Panel; Future  -     MICROALBUMIN CREAT RATIO URINE; Future    Impaired glucose tolerance  Chronic medical diagnosis.  Improving.  Hemoglobin A1c decreased to 5.9%.  -     POCT Hemoglobin A1C    Panlobular emphysema (HCC)  Chronic medical diagnosis.  Stable.  Continue with Symbicort for now.  Encouraged to check with insurance to see what is on her formulary.        No follow-ups on file.  This note was created using voice recognition software (Dragon). The accuracy of the dictation is limited by the abilities of the software. I have reviewed the note prior to signing, however some errors in grammar and context are still possible. If you have any questions related to this note please do not hesitate to contact our office.

## 2020-01-09 ENCOUNTER — TELEPHONE (OUTPATIENT)
Dept: MEDICAL GROUP | Facility: PHYSICIAN GROUP | Age: 80
End: 2020-01-09

## 2020-01-09 LAB
CREAT UR-MCNC: 93.6 MG/DL
MICROALBUMIN UR-MCNC: 3.7 MG/DL
MICROALBUMIN/CREAT UR: 40 MG/G (ref 0–30)

## 2020-01-09 NOTE — TELEPHONE ENCOUNTER
----- Message from Wilolw Cavanaugh M.D. sent at 1/9/2020 11:58 AM PST -----  Please call patient and let her know that her kidney function labs show an improvement.    Thank You,  Willow Cavanaugh M.D.

## 2020-01-12 DIAGNOSIS — R49.0 HOARSENESS OF VOICE: ICD-10-CM

## 2020-01-13 RX ORDER — OMEPRAZOLE 20 MG/1
CAPSULE, DELAYED RELEASE ORAL
Qty: 90 CAP | Refills: 3 | Status: SHIPPED | OUTPATIENT
Start: 2020-01-13 | End: 2021-01-22

## 2020-01-13 NOTE — TELEPHONE ENCOUNTER
Was the patient seen in the last year in this department? Yes LOV 01/08/2020    Does patient have an active prescription for medications requested? No     Received Request Via: Pharmacy

## 2020-01-13 NOTE — TELEPHONE ENCOUNTER
Requested Prescriptions     Pending Prescriptions Disp Refills   • omeprazole (PRILOSEC) 20 MG delayed-release capsule [Pharmacy Med Name: OMEPRAZOLE DR 20 MG CAPSULE] 90 Cap 3     Sig: TAKE 1 CAPSULE BY MOUTH EVERY DAY   Willow Cavanaugh M.D.

## 2020-01-27 ENCOUNTER — TELEPHONE (OUTPATIENT)
Dept: MEDICAL GROUP | Facility: PHYSICIAN GROUP | Age: 80
End: 2020-01-27

## 2020-01-27 DIAGNOSIS — J43.1 PANLOBULAR EMPHYSEMA (HCC): ICD-10-CM

## 2020-01-27 RX ORDER — DOXYCYCLINE HYCLATE 100 MG
100 TABLET ORAL 2 TIMES DAILY
Qty: 14 TAB | Refills: 0 | Status: SHIPPED | OUTPATIENT
Start: 2020-01-27 | End: 2020-04-14

## 2020-01-28 ENCOUNTER — TELEPHONE (OUTPATIENT)
Dept: MEDICAL GROUP | Facility: PHYSICIAN GROUP | Age: 80
End: 2020-01-28

## 2020-01-28 NOTE — TELEPHONE ENCOUNTER
1. Caller Name: Valorie Sierra                                         Call Back Number: [phone] ROOM [address] (home)         Patient approves a detailed voicemail message: yes    2. What are the patient's symptoms (location & severity)? Severe cold symptom rhinorrhea right ear fullness    3. Is this a new symptom Yes    4. When did it start? 2 weeks    5. Action taken per Active Symptom Guide: Same day appointment scheduled Urgent Care recommended Pt has a hard time with transportation and she states she just doesn't feel well exhausted.  Pt is requesting antibiotics if possible. Pt was just seen on 01/08/2020    6. Patient agrees to recommended action per Active Symptom Escalation Protocol.

## 2020-01-28 NOTE — TELEPHONE ENCOUNTER
DOCUMENTATION OF PAR STATUS:    1. Name of Medication & Dose: SYMBICORT 80-4.5 MCG/ACT Aerosolv     2. Name of Prescription Coverage Company & phone #: [employer] [phone]    3. Date Prior Auth Submitted: 01/28/2020    4. What information was given to obtain insurance decision? Dx Cod    5. Prior Auth Status? Pending    6. Patient Notified: N\A    Valorie Sierra (Duarte: [id number])     [employer] has not yet replied to your PA request. You may close this dialog, return to your dashboard, and perform other tasks.  To check for an update later, open this request again from your dashboard.  If [employer] has not replied to your request within 24-72 hours please contact [employer] at [phone].

## 2020-01-28 NOTE — PROGRESS NOTES
Pt informed.  
Requested Prescriptions     Pending Prescriptions Disp Refills   • doxycycline (VIBRAMYCIN) 100 MG Tab 14 Tab 0     Sig: Take 1 Tab by mouth 2 times a day.     Willow Cavanaugh M.D.     Please let patient know that I have sent over a prescription for doxycycline to her pharmacy.  She should start this as soon as she can.  If she is not noticing improvement within 48 hours or has fevers or increased shortness of breath, she should go to the emergency room immediately.    Willow Cavanaugh M.D.  
[id number] [facility]

## 2020-01-30 DIAGNOSIS — J43.1 PANLOBULAR EMPHYSEMA (HCC): ICD-10-CM

## 2020-01-30 RX ORDER — BUDESONIDE AND FORMOTEROL FUMARATE DIHYDRATE 80; 4.5 UG/1; UG/1
2 AEROSOL RESPIRATORY (INHALATION) 2 TIMES DAILY
Qty: 1 INHALER | Refills: 2 | Status: SHIPPED | OUTPATIENT
Start: 2020-01-30 | End: 2020-01-31

## 2020-01-30 NOTE — TELEPHONE ENCOUNTER
FINAL PRIOR AUTHORIZATION STATUS:    1.  Name of Medication & Dose: Symbicort 80-4.5 mcg/Act Aerosol     2. Prior Auth Status: Denied.  Reason: Denied on January 29 Your provider has decided to change your prescription to one of [employer]s covered alternatives. The originally requested drug is not on [facility] preferred drug list (formulary). Approval of the originally requested drug (called a formulary exception) requires that you first try some of the preferred drugs on [facility] list (formul[facility]), or tell us if they were/are not right for you. The drugs on [employer] drug list include: Symbicort HFA aerosol inhaler. Your providers statement didnt say the drugs on [employer] drug list: wont or havent worked, or that they would cause a bad drug reaction or incorrect usage. Your original request doesnt meet the coverage criteria for a formulary exception, but your provider has decided to change your prescription to one of [employer]s covered alternatives. Please discuss this letter, and the new drug, with your provider. This determination was based on the [facility] Non-Formulary Exceptions Coverage Policy.    3. Action Taken: Pharmacy Notified: N\A Patient Notified: N\A

## 2020-01-30 NOTE — TELEPHONE ENCOUNTER
Dr. Cavanaugh Please send in a Prescription for the New Medication Symbicort HFA Aerosol Per Insurance.  Thank you

## 2020-01-30 NOTE — PROGRESS NOTES
Requested Prescriptions     Signed Prescriptions Disp Refills   • budesonide-formoterol (SYMBICORT) 80-4.5 MCG/ACT Aerosol 1 Inhaler 2     Sig: Inhale 2 Puffs by mouth 2 Times a Day.   Willow Cavanaugh M.D.

## 2020-01-30 NOTE — TELEPHONE ENCOUNTER
Called [employer] to get Tier Exception on her Inhaler Symbicort.   Spoke with Toribio Wright Auth Representative.    Needed answer question Tried and failed other medication that treats current condition, DX Code,   Will receive decision   Within  72 hours  Reference # [id number]   need additional assistance or to get final decision call [phone] option 1

## 2020-01-31 ENCOUNTER — TELEPHONE (OUTPATIENT)
Dept: MEDICAL GROUP | Facility: PHYSICIAN GROUP | Age: 80
End: 2020-01-31

## 2020-01-31 DIAGNOSIS — J43.1 PANLOBULAR EMPHYSEMA (HCC): ICD-10-CM

## 2020-01-31 NOTE — TELEPHONE ENCOUNTER
1. Caller Name: Valorie Sierra                        Call Back Number: [phone] RO[address] (home)       How would the patient prefer to be contacted with a response: Phone call OK to leave a detailed message    Pt has been advise will call if too expensive

## 2020-01-31 NOTE — TELEPHONE ENCOUNTER
Spoke with [facility] have found an Inhaler the is very low cost  $5.00 Fluticasone/Salmeterol 100-50.  Please send a new Rx to Pharmacy Please I will follow up with  Pharmacy and Pt before  I leave today

## 2020-01-31 NOTE — PROGRESS NOTES
Requested Prescriptions     Signed Prescriptions Disp Refills   • fluticasone-salmeterol (ADVAIR) 100-50 MCG/DOSE AEROSOL POWDER, BREATH ACTIVATED 1 Inhaler 2     Sig: Inhale 1 Puff by mouth every 12 hours.   Willow Cavanaugh M.D.

## 2020-01-31 NOTE — TELEPHONE ENCOUNTER
Current Tier Exception for SYMBICORT 80-4.5 MCG/ACT Aerosol has been denied. Please send in an alternative medication for this patient like Advair. All request has been exhausted. Except tried and failed medications.

## 2020-02-03 NOTE — TELEPHONE ENCOUNTER
DOCUMENTATION OF PAR STATUS:    1. Name of Medication & Dose: Fluticasone/Salmeterol 100-50     2. Name of Prescription Coverage Company & phone #: [employer] [phone]. Reference # [id number]    3. Date Prior Auth Submitted: 02/03/2020    4. What information was given to obtain insurance decision? Dx code Tried and failed Medication    5. Prior Auth Status? Pending    6. Patient Notified: yes

## 2020-02-03 NOTE — TELEPHONE ENCOUNTER
Phone Number Called: [phone] ROOM [address] (home)       Call outcome: Spoke to patient regarding message below.    Message: pt is aware of rx sent and is very pleased with the price.

## 2020-04-14 ENCOUNTER — OFFICE VISIT (OUTPATIENT)
Dept: MEDICAL GROUP | Facility: PHYSICIAN GROUP | Age: 80
End: 2020-04-14
Payer: MEDICARE

## 2020-04-14 ENCOUNTER — HOSPITAL ENCOUNTER (OUTPATIENT)
Dept: LAB | Facility: MEDICAL CENTER | Age: 80
End: 2020-04-14
Attending: FAMILY MEDICINE
Payer: MEDICARE

## 2020-04-14 VITALS
SYSTOLIC BLOOD PRESSURE: 142 MMHG | HEIGHT: 60 IN | TEMPERATURE: 98.1 F | OXYGEN SATURATION: 93 % | BODY MASS INDEX: 28.86 KG/M2 | HEART RATE: 60 BPM | RESPIRATION RATE: 24 BRPM | DIASTOLIC BLOOD PRESSURE: 64 MMHG | WEIGHT: 147 LBS

## 2020-04-14 DIAGNOSIS — D63.1 ANEMIA DUE TO CHRONIC KIDNEY DISEASE, UNSPECIFIED CKD STAGE: ICD-10-CM

## 2020-04-14 DIAGNOSIS — N18.9 ANEMIA DUE TO CHRONIC KIDNEY DISEASE, UNSPECIFIED CKD STAGE: ICD-10-CM

## 2020-04-14 DIAGNOSIS — J43.1 PANLOBULAR EMPHYSEMA (HCC): ICD-10-CM

## 2020-04-14 LAB
HCT VFR BLD AUTO: 40 % (ref 37–47)
HGB BLD-MCNC: 13 G/DL (ref 12–16)

## 2020-04-14 PROCEDURE — 36415 COLL VENOUS BLD VENIPUNCTURE: CPT

## 2020-04-14 PROCEDURE — 99213 OFFICE O/P EST LOW 20 MIN: CPT | Performed by: FAMILY MEDICINE

## 2020-04-14 PROCEDURE — 85014 HEMATOCRIT: CPT

## 2020-04-14 PROCEDURE — 85018 HEMOGLOBIN: CPT

## 2020-04-14 ASSESSMENT — PATIENT HEALTH QUESTIONNAIRE - PHQ9
6. FEELING BAD ABOUT YOURSELF - OR THAT YOU ARE A FAILURE OR HAVE LET YOURSELF OR YOUR FAMILY DOWN: NOT AL ALL
5. POOR APPETITE OR OVEREATING: NOT AT ALL
SUM OF ALL RESPONSES TO PHQ9 QUESTIONS 1 AND 2: 1
7. TROUBLE CONCENTRATING ON THINGS, SUCH AS READING THE NEWSPAPER OR WATCHING TELEVISION: NOT AT ALL
1. LITTLE INTEREST OR PLEASURE IN DOING THINGS: NOT AT ALL
9. THOUGHTS THAT YOU WOULD BE BETTER OFF DEAD, OR OF HURTING YOURSELF: NOT AT ALL
3. TROUBLE FALLING OR STAYING ASLEEP OR SLEEPING TOO MUCH: NOT AT ALL
4. FEELING TIRED OR HAVING LITTLE ENERGY: NOT AT ALL
SUM OF ALL RESPONSES TO PHQ QUESTIONS 1-9: 1
2. FEELING DOWN, DEPRESSED, IRRITABLE, OR HOPELESS: SEVERAL DAYS
8. MOVING OR SPEAKING SO SLOWLY THAT OTHER PEOPLE COULD HAVE NOTICED. OR THE OPPOSITE, BEING SO FIGETY OR RESTLESS THAT YOU HAVE BEEN MOVING AROUND A LOT MORE THAN USUAL: NOT AT ALL

## 2020-04-14 ASSESSMENT — FIBROSIS 4 INDEX: FIB4 SCORE: 0.99

## 2020-04-14 NOTE — PROGRESS NOTES
cc: Emphysema    Subjective:     Valorie Sierra is a 79 y.o. female presenting for follow-up of her emphysema.    1. Panlobular emphysema (HCC)  chronic.  She has been having difficulty refilling her prescriptions due to cost.  Was able to get some Symbicort and has been using it nightly.  Continues to use her albuterol as needed.  Denies any coughing but does complain of occasional shortness of breath.  Currently living in a motel with her son.  Does not have a car.  States that she does not have means of going to the sample pharmacy to  sample inhalers.    Anemia  Chronic medical diagnosis.  Declining a fit test today.  Previous labs from January have GFR 57.  Hemoglobin hematocrit from November 2019 is decreased at 11.1 and 34.3       Review of systems:  See above   Allergies   Allergen Reactions   • Iodine Itching   • Oxycodone-Acetaminophen Hives   • Sulfa Drugs Vomiting   • Food      avocado         Current Outpatient Medications:   •  omeprazole (PRILOSEC) 20 MG delayed-release capsule, TAKE 1 CAPSULE BY MOUTH EVERY DAY, Disp: 90 Cap, Rfl: 3  •  Probiotic Product (PROBIOTIC DAILY PO), Take  by mouth., Disp: , Rfl:   •  atorvastatin (LIPITOR) 80 MG tablet, Take 1 Tab by mouth every evening., Disp: 90 Tab, Rfl: 3  •  lisinopril (PRINIVIL) 20 MG Tab, Take 1 Tab by mouth every day., Disp: 90 Tab, Rfl: 3  •  metoprolol (LOPRESSOR) 25 MG Tab, Take 0.5 Tabs by mouth 2 times a day., Disp: 90 Tab, Rfl: 3  •  albuterol 108 (90 Base) MCG/ACT Aero Soln inhalation aerosol, Inhale 2 Puffs by mouth every 6 hours as needed for Shortness of Breath., Disp: 8.5 g, Rfl: 3  •  furosemide (LASIX) 20 MG Tab, Take 1/2 tablet 3 times a week, Disp: 45 Tab, Rfl: 0  •  Calcium Carbonate-Vit D-Min (CALCIUM 1200 PO), Take  by mouth., Disp: , Rfl:   •  aspirin EC (ECOTRIN) 81 MG TBEC, Take 1 Tab by mouth every day., Disp: 90 Tab, Rfl: 3    Allergies, past medical history, past surgical history, family history, social history  reviewed and updated    Objective:     Vitals: /64 (BP Location: Right arm, Patient Position: Sitting, BP Cuff Size: Adult)   Pulse 60   Temp 36.7 °C (98.1 °F) (Temporal)   Resp (!) 24   Ht 1.524 m (5')   Wt 66.7 kg (147 lb)   SpO2 93%   BMI 28.71 kg/m²   General:  Alert, pleasant, NAD  Eyes:  normal inspection of conjunctivae and lids, EOMI,   ENMT:  External ears and nose are normal.    Neck  supple,   Heart:  Regular rate and rhythm,  No LE edema  Respiratory:  Normal respiratory effort, Clear to auscultation bilaterally.  Abdomen:   soft, Non-distended,   Skin:  Warm, dry, no rashes,   Musculoskeletal:  Normal gait, Normal digits and nails.  Neurological: No tremors,   Psych:   Affect/mood is normal, judgement is good, memory is intact, grooming is appropriate.    Assessment/Plan:     Valorie was seen today for follow-up and medication problem.    Diagnoses and all orders for this visit:    Panlobular emphysema (HCC)  Chronic.  Currently stable.  Sample pharmacy in their inventory discussed in detail with patient.  Mentions that currently she does have Symbicort.  Also states that she lives in a motel and does not have transportation to get to the pharmacy.  We will continue to monitor.    Anemia due to chronic kidney disease, unspecified CKD stage  Chronic medical diagnosis.  Previous labs do show increase in anemia.  We will recheck hemoglobin hematocrit.  Patient declines fit test.  -     HEMOGLOBIN AND HEMATOCRIT; Future          Return in about 4 months (around 8/14/2020).  This note was created using voice recognition software (Dragon). The accuracy of the dictation is limited by the abilities of the software. I have reviewed the note prior to signing, however some errors in grammar and context are still possible. If you have any questions related to this note please do not hesitate to contact our office.

## 2020-06-29 DIAGNOSIS — I10 ESSENTIAL HYPERTENSION: ICD-10-CM

## 2020-06-30 RX ORDER — LISINOPRIL 20 MG/1
TABLET ORAL
Qty: 90 TAB | Refills: 3 | Status: SHIPPED | OUTPATIENT
Start: 2020-06-30 | End: 2021-06-16 | Stop reason: DRUGHIGH

## 2020-06-30 NOTE — TELEPHONE ENCOUNTER
Requested Prescriptions     Pending Prescriptions Disp Refills   • lisinopril (PRINIVIL) 20 MG Tab [Pharmacy Med Name: LISINOPRIL 20 MG TABLET] 90 Tab 3     Sig: TAKE 1 TABLET BY MOUTH EVERY DAY   Willow Cavanaugh M.D.

## 2020-07-27 DIAGNOSIS — I10 ESSENTIAL HYPERTENSION: ICD-10-CM

## 2020-07-27 DIAGNOSIS — E78.5 DYSLIPIDEMIA: ICD-10-CM

## 2020-07-27 RX ORDER — ATORVASTATIN CALCIUM 80 MG/1
80 TABLET, FILM COATED ORAL EVERY EVENING
Qty: 90 TAB | Refills: 0 | Status: SHIPPED | OUTPATIENT
Start: 2020-07-27 | End: 2020-10-20

## 2020-07-28 NOTE — TELEPHONE ENCOUNTER
Requested Prescriptions     Pending Prescriptions Disp Refills   • metoprolol (LOPRESSOR) 25 MG Tab 90 Tab 0     Sig: Take 0.5 Tabs by mouth 2 times a day.   • atorvastatin (LIPITOR) 80 MG tablet 90 Tab 0     Sig: Take 1 Tab by mouth every evening.       LUCIE Dickson.

## 2020-08-18 ENCOUNTER — OFFICE VISIT (OUTPATIENT)
Dept: MEDICAL GROUP | Facility: PHYSICIAN GROUP | Age: 80
End: 2020-08-18
Payer: MEDICARE

## 2020-08-18 VITALS
RESPIRATION RATE: 20 BRPM | SYSTOLIC BLOOD PRESSURE: 140 MMHG | WEIGHT: 148 LBS | HEART RATE: 80 BPM | DIASTOLIC BLOOD PRESSURE: 74 MMHG | OXYGEN SATURATION: 93 % | HEIGHT: 60 IN | TEMPERATURE: 97.7 F | BODY MASS INDEX: 29.06 KG/M2

## 2020-08-18 DIAGNOSIS — E78.5 DYSLIPIDEMIA: ICD-10-CM

## 2020-08-18 DIAGNOSIS — H61.21 IMPACTED CERUMEN, RIGHT EAR: ICD-10-CM

## 2020-08-18 DIAGNOSIS — I25.119 CORONARY ARTERY DISEASE INVOLVING NATIVE CORONARY ARTERY OF NATIVE HEART WITH ANGINA PECTORIS (HCC): ICD-10-CM

## 2020-08-18 DIAGNOSIS — I27.20 PULMONARY HYPERTENSION (HCC): ICD-10-CM

## 2020-08-18 DIAGNOSIS — I10 ESSENTIAL HYPERTENSION: ICD-10-CM

## 2020-08-18 PROBLEM — F32.A MILD DEPRESSION: Status: RESOLVED | Noted: 2018-04-19 | Resolved: 2020-08-18

## 2020-08-18 PROCEDURE — 99214 OFFICE O/P EST MOD 30 MIN: CPT | Performed by: FAMILY MEDICINE

## 2020-08-18 ASSESSMENT — PATIENT HEALTH QUESTIONNAIRE - PHQ9
SUM OF ALL RESPONSES TO PHQ QUESTIONS 1-9: 3
5. POOR APPETITE OR OVEREATING: 0 - NOT AT ALL
CLINICAL INTERPRETATION OF PHQ2 SCORE: 1

## 2020-08-18 ASSESSMENT — FIBROSIS 4 INDEX: FIB4 SCORE: 0.99

## 2020-08-18 NOTE — PROGRESS NOTES
CC: Hearing problem                                                                                                                               Valorie presents today for follow-up and for hearing problem.      Right ear cerumen  New issue.  Complains of right hearing loss.  This is been going on for several months.  She has never had her ears irrigated.    Hypertension   chronic medical condition. BP today is 140/74 Currently taking  Lasix 20 mg, half a tablet 3 times a week, lisinopril 20 mg, metoprolol 12.5 mg twice a day. Denies symptoms of chest pain, headaches, or shortness of breath.     Dyslipidemia/ CAD/ pulmonary hypertension  Chronic medical condition.  Currently taking lipitor 80mg..  Tolerating the medication well without any side effects.  Patient denies chest pain or lower extremity muscle cramps.  Last set of labs from May 2019 were cholesterol 154, triglycerides 63, hdl 72, and ldl 69.Has aF/u with Dr Stanley in September.  Had a CABG in 2014. Last echo in may 2018 with findings of pulmonary pressure of 65.  Patient states that she stopped using her Symbicort due to cost.  Is declining pulmonary function test today.    Patient Active Problem List    Diagnosis Date Noted   • Dyslipidemia 09/23/2014     Priority: High   • Essential hypertension 09/23/2014     Priority: High   • Hx of CABG 09/23/2014     Priority: High   • Impacted cerumen, right ear 08/18/2020   • Anemia due to chronic kidney disease 04/14/2020   • CKD (chronic kidney disease) stage 3, GFR 30-59 ml/min ([facility]) 06/07/2019   • Pulmonary hypertension ([facility]) 07/31/2018   • Mitral insufficiency 07/31/2018   • Risk for falls 04/18/2017   • Impaired glucose tolerance 04/18/2017   • Osteopenia 11/01/2016   • Coronary artery disease involving native coronary artery of native heart without angina pectoris 04/28/2016   • COPD (chronic obstructive pulmonary disease) ([facility]) 09/23/2014   • Diastolic CHF ([facility]) 09/10/2014       Current Outpatient  Medications   Medication Sig Dispense Refill   • metoprolol (LOPRESSOR) 25 MG Tab Take 0.5 Tabs by mouth 2 times a day. 90 Tab 0   • atorvastatin (LIPITOR) 80 MG tablet Take 1 Tab by mouth every evening. 90 Tab 0   • lisinopril (PRINIVIL) 20 MG Tab TAKE 1 TABLET BY MOUTH EVERY DAY 90 Tab 3   • omeprazole (PRILOSEC) 20 MG delayed-release capsule TAKE 1 CAPSULE BY MOUTH EVERY DAY 90 Cap 3   • Probiotic Product (PROBIOTIC DAILY PO) Take  by mouth.     • furosemide (LASIX) 20 MG Tab Take 1/2 tablet 3 times a week 45 Tab 0   • Calcium Carbonate-Vit D-Min (CALCIUM 1200 PO) Take  by mouth.     • aspirin EC (ECOTRIN) 81 MG TBEC Take 1 Tab by mouth every day. 90 Tab 3   • albuterol 108 (90 Base) MCG/ACT Aero Soln inhalation aerosol Inhale 2 Puffs by mouth every 6 hours as needed for Shortness of Breath. (Patient not taking: Reported on 8/18/2020) 8.5 g 3     No current facility-administered medications for this visit.          Allergies as of 08/18/2020 - Reviewed 08/18/2020   Allergen Reaction Noted   • Iodine Itching 09/08/2014   • Oxycodone-acetaminophen Hives 09/08/2014   • Sulfa drugs Vomiting 09/08/2014   • Food  01/08/2020   • Nsaids  08/18/2020          /74 (BP Location: Left arm, Patient Position: Sitting, BP Cuff Size: Adult)   Pulse 80   Temp 36.5 °C (97.7 °F) (Temporal)   Resp (!) 24   Ht 1.524 m (5')   Wt 67.1 kg (148 lb)   SpO2 93%   BMI 28.90 kg/m²     Physical Exam:  Gen:         Alert and oriented, No apparent distress.  Right ear cerumen  Neck:        supple, No Lymphadenopathy  Lungs:     Clear to auscultation bilaterally  CV:          Regular rate and rhythm. no LE edema             Ext:          No clubbing, cyanosis      Assessment and Plan.   79 y.o. female with the following issues.    Valorie was seen today for follow-up and hearing problem.    Diagnoses and all orders for this visit:    Essential hypertension  Chronic.  Overall stable.  We will continue to monitor.  Follow-up with  cardiology next month    Impacted cerumen, right ear  New medical problem.  Irrigation was attempted by the medical assistant patient could not tolerate the procedure and was in a hurry for her right.    Dyslipidemia  Chronic.  Stable.c/w lipitor    Pulmonary hypertension (HCC)  Chronic.  C/w lasix.  F/u cards next month    Coronary artery disease involving native coronary artery of native heart with angina pectoris (HCC)  Chronic.  C/w asa and statin      Follow up:4 months for AWV

## 2020-09-27 DIAGNOSIS — R60.9 EDEMA, UNSPECIFIED TYPE: ICD-10-CM

## 2020-09-28 RX ORDER — FUROSEMIDE 20 MG/1
TABLET ORAL
Qty: 45 TAB | Refills: 0 | Status: SHIPPED | OUTPATIENT
Start: 2020-09-28 | End: 2020-12-22

## 2020-10-20 DIAGNOSIS — E78.5 DYSLIPIDEMIA: ICD-10-CM

## 2020-10-20 DIAGNOSIS — I10 ESSENTIAL HYPERTENSION: ICD-10-CM

## 2020-10-20 RX ORDER — ATORVASTATIN CALCIUM 80 MG/1
TABLET, FILM COATED ORAL
Qty: 90 TAB | Refills: 0 | Status: SHIPPED | OUTPATIENT
Start: 2020-10-20 | End: 2021-01-18

## 2020-10-20 NOTE — TELEPHONE ENCOUNTER
Received request via: Pharmacy    Was the patient seen in the last year in this department? Yes LOV 08/18/2020    Does the patient have an active prescription (recently filled or refills available) for medication(s) requested? No

## 2020-10-20 NOTE — TELEPHONE ENCOUNTER
Requested Prescriptions     Pending Prescriptions Disp Refills   • atorvastatin (LIPITOR) 80 MG tablet [Pharmacy Med Name: ATORVASTATIN 80 MG TABLET] 90 Tab 0     Sig: TAKE 1 TABLET BY MOUTH EVERY DAY IN THE EVENING   • metoprolol (LOPRESSOR) 25 MG Tab [Pharmacy Med Name: METOPROLOL TARTRATE 25 MG TAB] 90 Tab 0     Sig: TAKE 1/2 TABLET BY MOUTH 2 TIMES A DAY   Willow Cavanaugh M.D.

## 2020-12-03 ENCOUNTER — TELEPHONE (OUTPATIENT)
Dept: MEDICAL GROUP | Facility: PHYSICIAN GROUP | Age: 80
End: 2020-12-03

## 2020-12-03 NOTE — TELEPHONE ENCOUNTER
Future Appointments       Provider Department Center    12/15/2020 1:20 PM Willow Cavanaugh M.D.; [facility]  [facility] [place]    1/4/2021 2:00 PM Kyle Stanley M.D. [facility]       ANNUAL WELLNESS VISIT PRE-VISIT PLANNING WITHOUT OUTREACH    1.  Reviewed note from last office visit with PCP: YES, LOV 08/18/20      2.  If any orders were placed at last visit, do we have Results/Consult Notes?        •  Labs - Labs were not ordered at last office visit.       •  Imaging - Imaging was not ordered at last office visit.       •  Referrals - No referrals were ordered at last office visit.    3.  Immunizations were updated in [employer] using [employer]?: Yes       •  [employer] Recommendations: TD, SHINGRIX (Shingles) and CPOX        •  Is patient due for Tdap? NO       •  Is patient due for Shingrix? YES. Patient was not notified of copay/out of pocket cost.     4.  Patient is due for the following Health Maintenance Topics:   Health Maintenance Due   Topic Date Due   • Annual Wellness Visit  1940   • BONE DENSITY  09/22/2018   • Annual Pulmonary Function Test / Spirometry  12/12/2018   • IMM INFLUENZA (1) 09/01/2020     5.  Reviewed/Updated the following with patient:       •   Preferred Pharmacy? YES       •   Preferred Lab? YES       •   Preferred Communication? YES       •   Allergies? YES       •   Medications? YES. Was Abstract Encounter opened and chart updated? YES       •   Social History? YES. Was Abstract Encounter opened and chart updated? YES       •   Family History (document living status of immediate family members and if + hx of  cancer, diabetes, hypertension, hyperlipidemia, heart attack, stroke) YES. Was Abstract Encounter opened and chart updated? YES    6.  Care Team Updated:       •   DME Company (gait device, O2, CPAP, etc.): YES, patient does use cane and walker       •   Other Specialists (eye doctor, derm, GYN, cardiology, endo, etc):  YES, providers updated and she was not sure when her last eye exam was.    7. Orders for overdue Health Maintenance topics pended in Pre-Charting? NO    8.  Patient has the following Care Path diagnoses on Problem List:  NONE, patient was not clear on what some of her health conditions are, she was upset because her son was in the hospital and expected not to make it.     9.  Patient was advised: “This is a free wellness visit. The provider will screen for medical conditions to help you stay healthy. If you have other concerns to address you may be asked to discuss these at a separate visit or there may be an additional fee.”     10.  Patient was informed to arrive 15 min prior to their scheduled appointment and bring in their medication bottles.

## 2020-12-10 DIAGNOSIS — I10 ESSENTIAL HYPERTENSION: ICD-10-CM

## 2020-12-11 NOTE — TELEPHONE ENCOUNTER
Requested Prescriptions     Signed Prescriptions Disp Refills   • metoprolol (LOPRESSOR) 25 MG Tab 90 Tab 0     Sig: TAKE 1/2 TABLET BY MOUTH TWICE A DAY     Authorizing Provider: RON DUPONT A.P.R.N.

## 2020-12-15 ENCOUNTER — OFFICE VISIT (OUTPATIENT)
Dept: MEDICAL GROUP | Facility: PHYSICIAN GROUP | Age: 80
End: 2020-12-15
Payer: MEDICARE

## 2020-12-15 VITALS
HEIGHT: 60 IN | WEIGHT: 156 LBS | RESPIRATION RATE: 12 BRPM | TEMPERATURE: 98.6 F | BODY MASS INDEX: 30.63 KG/M2 | OXYGEN SATURATION: 93 % | DIASTOLIC BLOOD PRESSURE: 70 MMHG | HEART RATE: 60 BPM | SYSTOLIC BLOOD PRESSURE: 130 MMHG

## 2020-12-15 DIAGNOSIS — D63.1 ANEMIA DUE TO CHRONIC KIDNEY DISEASE, UNSPECIFIED CKD STAGE: ICD-10-CM

## 2020-12-15 DIAGNOSIS — Z91.81 RISK FOR FALLS: ICD-10-CM

## 2020-12-15 DIAGNOSIS — I27.20 PULMONARY HYPERTENSION (HCC): ICD-10-CM

## 2020-12-15 DIAGNOSIS — Z00.00 MEDICARE ANNUAL WELLNESS VISIT, SUBSEQUENT: ICD-10-CM

## 2020-12-15 DIAGNOSIS — I10 ESSENTIAL HYPERTENSION: ICD-10-CM

## 2020-12-15 DIAGNOSIS — I34.0 NONRHEUMATIC MITRAL VALVE REGURGITATION: ICD-10-CM

## 2020-12-15 DIAGNOSIS — I25.10 CORONARY ARTERY DISEASE INVOLVING NATIVE CORONARY ARTERY OF NATIVE HEART WITHOUT ANGINA PECTORIS: ICD-10-CM

## 2020-12-15 DIAGNOSIS — E78.5 DYSLIPIDEMIA: ICD-10-CM

## 2020-12-15 DIAGNOSIS — N18.31 STAGE 3A CHRONIC KIDNEY DISEASE: ICD-10-CM

## 2020-12-15 DIAGNOSIS — N18.9 ANEMIA DUE TO CHRONIC KIDNEY DISEASE, UNSPECIFIED CKD STAGE: ICD-10-CM

## 2020-12-15 DIAGNOSIS — R73.02 IMPAIRED GLUCOSE TOLERANCE: ICD-10-CM

## 2020-12-15 DIAGNOSIS — I50.32 CHRONIC DIASTOLIC CONGESTIVE HEART FAILURE (HCC): ICD-10-CM

## 2020-12-15 DIAGNOSIS — J43.1 PANLOBULAR EMPHYSEMA (HCC): ICD-10-CM

## 2020-12-15 DIAGNOSIS — Z95.1 HX OF CABG: ICD-10-CM

## 2020-12-15 DIAGNOSIS — M85.80 OSTEOPENIA, UNSPECIFIED LOCATION: ICD-10-CM

## 2020-12-15 PROBLEM — H61.21 IMPACTED CERUMEN, RIGHT EAR: Status: RESOLVED | Noted: 2020-08-18 | Resolved: 2020-12-15

## 2020-12-15 PROCEDURE — G0439 PPPS, SUBSEQ VISIT: HCPCS | Performed by: FAMILY MEDICINE

## 2020-12-15 RX ORDER — A/SINGAPORE/GP1908/2015 IVR-180 (AN A/MICHIGAN/45/2015 (H1N1)PDM09-LIKE VIRUS, A/HONG KONG/4801/2014, NYMC X-263B (H3N2) (AN A/HONG KONG/4801/2014-LIKE VIRUS), AND B/BRISBANE/60/2008, WILD TYPE (A B/BRISBANE/60/2008-LIKE VIRUS) 15; 15; 15 UG/.5ML; UG/.5ML; UG/.5ML
INJECTION, SUSPENSION INTRAMUSCULAR
COMMUNITY
Start: 2020-10-15 | End: 2021-05-18

## 2020-12-15 ASSESSMENT — ACTIVITIES OF DAILY LIVING (ADL): BATHING_REQUIRES_ASSISTANCE: 1

## 2020-12-15 ASSESSMENT — ENCOUNTER SYMPTOMS: GENERAL WELL-BEING: FAIR

## 2020-12-15 ASSESSMENT — PATIENT HEALTH QUESTIONNAIRE - PHQ9
CLINICAL INTERPRETATION OF PHQ2 SCORE: 2
5. POOR APPETITE OR OVEREATING: 0 - NOT AT ALL
SUM OF ALL RESPONSES TO PHQ QUESTIONS 1-9: 4

## 2020-12-15 ASSESSMENT — FIBROSIS 4 INDEX: FIB4 SCORE: 1

## 2020-12-15 NOTE — PROGRESS NOTES
Chief Complaint   Patient presents with   • Annual Wellness Visit     HC/AWV         HPI:  Valorie is a 80 y.o. here for Medicare Annual Wellness Visit      Patient Active Problem List    Diagnosis Date Noted   • Dyslipidemia 09/23/2014     Priority: High   • Essential hypertension 09/23/2014     Priority: High   • Hx of CABG 09/23/2014     Priority: High   • Impacted cerumen, right ear 08/18/2020   • Anemia due to chronic kidney disease 04/14/2020   • CKD (chronic kidney disease) stage 3, GFR 30-59 ml/min ([facility]) 06/07/2019   • Pulmonary hypertension ([facility]) 07/31/2018   • Mitral insufficiency 07/31/2018   • Risk for falls 04/18/2017   • Impaired glucose tolerance 04/18/2017   • Osteopenia 11/01/2016   • Coronary artery disease involving native coronary artery of native heart without angina pectoris 04/28/2016   • COPD (chronic obstructive pulmonary disease) ([facility]) 09/23/2014   • Diastolic CHF ([facility]) 09/10/2014       Current Outpatient Medications   Medication Sig Dispense Refill   • metoprolol (LOPRESSOR) 25 MG Tab TAKE 1/2 TABLET BY MOUTH TWICE A DAY 90 Tab 0   • atorvastatin (LIPITOR) 80 MG tablet TAKE 1 TABLET BY MOUTH EVERY DAY IN THE EVENING 90 Tab 0   • furosemide (LASIX) 20 MG Tab TAKE 1/2 TABLET BY MOUTH EVERY DAY 45 Tab 0   • lisinopril (PRINIVIL) 20 MG Tab TAKE 1 TABLET BY MOUTH EVERY DAY 90 Tab 3   • omeprazole (PRILOSEC) 20 MG delayed-release capsule TAKE 1 CAPSULE BY MOUTH EVERY DAY 90 Cap 3   • Probiotic Product (PROBIOTIC DAILY PO) Take  by mouth.     • albuterol 108 (90 Base) MCG/ACT Aero Soln inhalation aerosol Inhale 2 Puffs by mouth every 6 hours as needed for Shortness of Breath. 8.5 g 3   • Calcium Carbonate-Vit D-Min (CALCIUM 1200 PO) Take  by mouth.     • aspirin EC (ECOTRIN) 81 MG TBEC Take 1 Tab by mouth every day. 90 Tab 3     No current facility-administered medications for this visit.         Patient is taking medications as noted in medication list.  Current supplements as per medication  list.     Allergies: Iodine, Oxycodone-acetaminophen, Sulfa drugs, Food, and Nsaids    Current social contact/activities: patient talks to granddaughters on phone, her son lives with her.      Is patient current with immunizations? Yes.    She  reports that she quit smoking about 13 years ago. Her smoking use included cigarettes. She has a 165.00 pack-year smoking history. She has never used smokeless tobacco. She reports that she does not drink alcohol or use drugs.  Counseling given: Not Answered        DPA/Advanced directive: Patient does not have an Advanced Directive.  A packet and workshop information was given on Advanced Directives.    ROS:    Gait: Uses a cane, and a walker   Ostomy: No   Other tubes: No   Amputations: No   Chronic oxygen use No   Last eye exam: not sure, 3-4 years ago  Wears hearing aids: No   : Reports urinary leakage during the last 6 months that has interfered a lot with their daily activities or sleep.      Screening:      Depression Screening    Little interest or pleasure in doing things?  1 - several days  Feeling down, depressed, or hopeless? 1 - several days  Trouble falling or staying asleep, or sleeping too much?  1 - several days  Feeling tired or having little energy?  1 - several days  Poor appetite or overeating?  0 - not at all  Feeling bad about yourself - or that you are a failure or have let yourself or your family down? 0 - not at all  Trouble concentrating on things, such as reading the newspaper or watching television? 0 - not at all  Moving or speaking so slowly that other people could have noticed.  Or the opposite - being so fidgety or restless that you have been moving around a lot more than usual?  0 - not at all  Thoughts that you would be better off dead, or of hurting yourself?  0 - not at all  Patient Health Questionnaire Score: 4      If depressive symptoms identified deferred to follow up visit unless specifically addressed in assessment and  plan.    Interpretation of PHQ-9 Total Score   Score Severity   1-4 No Depression   5-9 Mild Depression   10-14 Moderate Depression   15-19 Moderately Severe Depression   20-27 Severe Depression      Screening for Cognitive Impairment    Three Minute Recall (river, nation, finger)  1/3 River, nation, finger   Draw clock face with all 12 numbers and set the hands to show 10 past 11.  Yes Clock set to 11:10   3/5  If cognitive concerns identified, deferred for follow up unless specifically addressed in assessment and plan.    Fall Risk Assessment    Has the patient had two or more falls in the last year or any fall with injury in the last year?  Yes  If fall risk identified, deferred for follow up unless specifically addressed in assessment and plan.      Safety Assessment    Throw rugs on floor.  No  Handrails on all stairs.  Yes  Good lighting in all hallways.  Yes  Difficulty hearing.  No  Patient counseled about all safety risks that were identified.    Functional Assessment ADLs    Are there any barriers preventing you from cooking for yourself or meeting nutritional needs?  Yes. Pt only has a microwave, no stove  Are there any barriers preventing you from driving safely or obtaining transportation?  Yes. Pt does not drive, she uses taxi, buses or [employer] service to bring her to her appointments  Are there any barriers preventing you from using a telephone or calling for help?  No.    Are there any barriers preventing you from shopping?  Yes. She can't stand for very long, son has to go with her to do the shopping  Are there any barriers preventing you from taking care of your own finances?  No.    Are there any barriers preventing you from managing your medications?    No.    Are there any barriers preventing you from showering, bathing or dressing yourself?  Yes. Pt does not shower, she washes, no hand bars to hold onto in her shower  Are you currently engaging in any exercise or physical activity?  No.      What is your perception of your health?  Fair.    Health Maintenance Summary                Annual Wellness Visit Overdue 1940     BONE DENSITY Overdue 2018      Done 2016     Annual Pulmonary Function Test / Spirometry Overdue 2018      Done 2017 PFT DICTATED RESULTS     Patient has more history with this topic...    IMM ZOSTER VACCINES Postponed 2021 Originally 10/18/2012. System: vaccine not available, other system reasons     Done 2012 Imm Admin: Zoster Vaccine Live (ZVL) (Zostavax) - HISTORICAL DATA    IMM DTaP/Tdap/Td Vaccine Next Due 2022      Done 2012 Imm Admin: Tdap Vaccine          Patient Care Team:  Willow Cavanaugh M.D. as PCP - General (Family Medicine)  Kyle Stanley M.D. (Interventional Cardiology)      Social History     Tobacco Use   • Smoking status: Former Smoker     Packs/day: 3.00     Years: 55.00     Pack years: 165.00     Types: Cigarettes     Quit date: 2007     Years since quittin.3   • Smokeless tobacco: Never Used   Substance Use Topics   • Alcohol use: No     Alcohol/week: 0.0 oz     Comment:    • Drug use: No     Family History   Problem Relation Age of Onset   • Stroke Mother    • Hyperlipidemia Mother    • Hypertension Mother    • Lung Disease Mother         smoker   • Heart Disease Father    • Hypertension Father    • Hyperlipidemia Father    • Lung Disease Father         smoker   • Alcohol/Drug Father         etoh   • Other Father         aneurysm   • Genetic Disorder Maternal Aunt         Parkinsons   • Heart Disease Paternal Aunt    • Hypertension Paternal Aunt    • Hyperlipidemia Paternal Aunt    • Psychiatric Illness Paternal Aunt         dementia   • Lung Disease Paternal Aunt         smoker   • Heart Disease Paternal Uncle    • Hypertension Paternal Uncle    • Hyperlipidemia Paternal Uncle    • Arthritis Maternal Grandmother         osteoporosis   • Lung Disease Maternal Grandmother         smoker   •  Heart Disease Paternal Grandmother    • Hypertension Paternal Grandmother    • Hyperlipidemia Paternal Grandmother    • Stroke Paternal Grandmother         dvt     She  has a past medical history of Anemia, Anxiety, Arrhythmia, Arthritis, Blood transfusion, without reported diagnosis, CHF (congestive heart failure) (HCC), Chronic airway obstruction, not elsewhere classified, Depression, Emphysema, GERD (gastroesophageal reflux disease), Headache, classical migraine, Heart attack (HCC), Heart murmur, Hyperlipidemia, Hypertension, IBD (inflammatory bowel disease), Kidney disease, Ulcer, and Unspecified cataract.   Past Surgical History:   Procedure Laterality Date   • MULTIPLE CORONARY ARTERY BYPASS ENDO VEIN HARVEST  9/1/2014    Performed by Stephen Nowak M.D. at SURGERY [facility]   • BLADDER NECK CONTRACTURE EXICISION  1970   • GANGLION EXCISION  1969    left wrist   • ABDOMINAL EXPLORATION     • ABDOMINAL HYSTERECTOMY TOTAL      fibroids   • APPENDECTOMY     • DENTAL EXTRACTION(S)           Exam:     There were no vitals taken for this visit. There is no height or weight on file to calculate BMI.    Hearing good.    Dentition: patient has upper and lower dentures, but does not wear them  Alert, oriented in no acute distress.  Eye contact is good, speech goal directed, affect calm      Assessment and Plan. The following treatment and monitoring plan is recommended:    No diagnosis found.      1. Medicare annual wellness visit, subsequent    2. Hx of CABG  History of. Follows up with Dr. Stanley, appointment scheduled for January     3. Stage 3a chronic kidney disease  Chronic.  Recent labs have improvement in GFR to 57.  Microalbuminuria improved to 40 with previous urine test.    4. Anemia due to chronic kidney disease, unspecified CKD stage  Chronic.  Recent labs from April have improvement of H&H of 13 and 40.    5. Essential hypertension  Chronic.  Stable.  Blood pressure today is 130/70.    6.  Dyslipidemia  Chronic.  Previous labs from May 2019 have normal lipid panel.  Continue with Lipitor.  Continue with metoprolol, lisinopril, furosemide daily.    7. Impaired glucose tolerance  Chronic.  Previous hemoglobin A1c was improved at 5.9%.    8. Risk for falls  Had 1 fall in the last year when her chair tipped over.    9. Panlobular emphysema (HCC)  Chronic.  Not well controlled.  Oxygen saturation is 93% on room air.  States that she has not been using any inhalers due to cost.    10. Coronary artery disease involving native coronary artery of native heart without angina pectoris  Chronic.  Continue with aspirin and statin.    11. Pulmonary hypertension (HCC)  Chronic.  Continue with current medications.    12. Nonrheumatic mitral valve regurgitation  Chronic.  Stable.  Continue to follow-up with cardiology.    13. Chronic diastolic congestive heart failure (HCC)  Chronic.  Stable.  Continue with current meds and follow-up with cardiology      Services suggested: No services needed at this time  Health Care Screening recommendations as per orders if indicated.  Referrals offered: PT/OT/Nutrition counseling/Behavioral Health/Smoking cessation as per orders if indicated.    Discussion today about general wellness and lifestyle habits:    · Prevent falls and reduce trip hazards; Cautioned about securing or removing rugs.  · Have a working fire alarm and carbon monoxide detector;   · Engage in regular physical activity and social activities       Follow-up: No follow-ups on file.

## 2020-12-20 DIAGNOSIS — R60.9 EDEMA, UNSPECIFIED TYPE: ICD-10-CM

## 2020-12-22 RX ORDER — FUROSEMIDE 20 MG/1
TABLET ORAL
Qty: 15 TAB | Refills: 0 | Status: SHIPPED | OUTPATIENT
Start: 2020-12-22 | End: 2021-01-04

## 2021-01-04 ENCOUNTER — OFFICE VISIT (OUTPATIENT)
Dept: CARDIOLOGY | Facility: MEDICAL CENTER | Age: 81
End: 2021-01-04
Payer: MEDICARE

## 2021-01-04 VITALS
HEART RATE: 65 BPM | RESPIRATION RATE: 14 BRPM | OXYGEN SATURATION: 92 % | SYSTOLIC BLOOD PRESSURE: 136 MMHG | HEIGHT: 60 IN | BODY MASS INDEX: 30.86 KG/M2 | WEIGHT: 157.2 LBS | DIASTOLIC BLOOD PRESSURE: 92 MMHG

## 2021-01-04 DIAGNOSIS — I27.20 PULMONARY HYPERTENSION (HCC): ICD-10-CM

## 2021-01-04 DIAGNOSIS — Z95.1 HX OF CABG: ICD-10-CM

## 2021-01-04 DIAGNOSIS — E78.5 DYSLIPIDEMIA: ICD-10-CM

## 2021-01-04 DIAGNOSIS — R60.9 EDEMA, UNSPECIFIED TYPE: ICD-10-CM

## 2021-01-04 DIAGNOSIS — I50.30 DIASTOLIC CONGESTIVE HEART FAILURE, UNSPECIFIED HF CHRONICITY (HCC): ICD-10-CM

## 2021-01-04 DIAGNOSIS — I34.0 NONRHEUMATIC MITRAL VALVE REGURGITATION: ICD-10-CM

## 2021-01-04 PROCEDURE — 99214 OFFICE O/P EST MOD 30 MIN: CPT | Performed by: INTERNAL MEDICINE

## 2021-01-04 RX ORDER — FUROSEMIDE 20 MG/1
20 TABLET ORAL
Qty: 40 TAB | Refills: 4 | Status: SHIPPED | OUTPATIENT
Start: 2021-01-04 | End: 2021-01-25

## 2021-01-04 ASSESSMENT — ENCOUNTER SYMPTOMS
SHORTNESS OF BREATH: 0
NERVOUS/ANXIOUS: 1
CARDIOVASCULAR NEGATIVE: 1
VOMITING: 0
HEARTBURN: 0
CONSTITUTIONAL NEGATIVE: 1
NEUROLOGICAL NEGATIVE: 1
WEIGHT LOSS: 0
MUSCULOSKELETAL NEGATIVE: 1
RESPIRATORY NEGATIVE: 1
FEVER: 0
PALPITATIONS: 0
NAUSEA: 0
BRUISES/BLEEDS EASILY: 0
GASTROINTESTINAL NEGATIVE: 1

## 2021-01-04 ASSESSMENT — FIBROSIS 4 INDEX: FIB4 SCORE: 1

## 2021-01-04 NOTE — PROGRESS NOTES
Chief Complaint   Patient presents with   • Hypertension   • Dyslipidemia       Subjective:   Valorie Sierra is a 80 y.o. female who presents today for follow-up of bypass graft surgery in 2014.  She has had no angina at all and no exertional dyspnea.  The patient has been less physically active because of the Covid pandemic.  Her son is now living with her and is chronically chronically ill.  This is caused her some stress.  She had recent lab work but did not have a lipid profile drawn.    Past Medical History:   Diagnosis Date   • Anemia    • Anxiety    • Arrhythmia    • Arthritis    • Blood transfusion, without reported diagnosis    • CHF (congestive heart failure) (HCC)    • Chronic airway obstruction, not elsewhere classified    • Depression    • Emphysema    • GERD (gastroesophageal reflux disease)    • Headache, classical migraine    • Heart attack (HCC)    • Heart murmur    • Hyperlipidemia    • Hypertension    • IBD (inflammatory bowel disease)    • Kidney disease    • Ulcer    • Unspecified cataract      Past Surgical History:   Procedure Laterality Date   • MULTIPLE CORONARY ARTERY BYPASS ENDO VEIN HARVEST  9/1/2014    Performed by Stephen Nowak M.D. at SURGERY [facility] ORS   • BLADDER NECK CONTRACTURE EXICISION  1970   • GANGLION EXCISION  1969    left wrist   • ABDOMINAL EXPLORATION     • ABDOMINAL HYSTERECTOMY TOTAL      fibroids   • APPENDECTOMY     • DENTAL EXTRACTION(S)       Family History   Problem Relation Age of Onset   • Stroke Mother    • Hyperlipidemia Mother    • Hypertension Mother    • Lung Disease Mother         smoker   • Heart Disease Father    • Hypertension Father    • Hyperlipidemia Father    • Lung Disease Father         smoker   • Alcohol/Drug Father         etoh   • Other Father         aneurysm   • Genetic Disorder Maternal Aunt         Parkinsons   • Heart Disease Paternal Aunt    • Hypertension Paternal Aunt    • Hyperlipidemia Paternal Aunt    • Psychiatric Illness  Paternal Aunt         dementia   • Lung Disease Paternal Aunt         smoker   • Heart Disease Paternal Uncle    • Hypertension Paternal Uncle    • Hyperlipidemia Paternal Uncle    • Arthritis Maternal Grandmother         osteoporosis   • Lung Disease Maternal Grandmother         smoker   • Heart Disease Paternal Grandmother    • Hypertension Paternal Grandmother    • Hyperlipidemia Paternal Grandmother    • Stroke Paternal Grandmother         dvt     Social History     Socioeconomic History   • Marital status:      Spouse name: Not on file   • Number of children: Not on file   • Years of education: Not on file   • Highest education level: Not on file   Occupational History   • Not on file   Social Needs   • Financial resource strain: Not on file   • Food insecurity     Worry: Not on file     Inability: Not on file   • Transportation needs     Medical: Not on file     Non-medical: Not on file   Tobacco Use   • Smoking status: Former Smoker     Packs/day: 3.00     Years: 55.00     Pack years: 165.00     Types: Cigarettes     Quit date: 2007     Years since quittin.3   • Smokeless tobacco: Never Used   Substance and Sexual Activity   • Alcohol use: No     Alcohol/week: 0.0 oz     Comment:    • Drug use: No   • Sexual activity: Never     Comment: lives with son in a motel, dtr  in , ,    Lifestyle   • Physical activity     Days per week: Not on file     Minutes per session: Not on file   • Stress: Not on file   Relationships   • Social connections     Talks on phone: Not on file     Gets together: Not on file     Attends [employer] service: Not on file     Active member of club or organization: Not on file     Attends meetings of clubs or organizations: Not on file     Relationship status: Not on file   • Intimate partner violence     Fear of current or ex partner: Not on file     Emotionally abused: Not on file     Physically abused: Not on file     Forced sexual activity:  Not on file   Other Topics Concern   • Not on file   Social History Narrative   • Not on file     Allergies   Allergen Reactions   • Iodine Itching   • Oxycodone-Acetaminophen Hives   • Sulfa Drugs Vomiting   • Food      avocado   • Nsaids      Pt states she was told to never take these because of her kidneys.     Outpatient Encounter Medications as of 1/4/2021   Medication Sig Dispense Refill   • furosemide (LASIX) 20 MG Tab Take 1 Tab by mouth every Monday, Wednesday, and Friday. 40 Tab 4   • FLUAD QUADRIVALENT 0.5 ML Prefilled Syringe PHARMACY ADMINISTERED     • metoprolol (LOPRESSOR) 25 MG Tab TAKE 1/2 TABLET BY MOUTH TWICE A DAY 90 Tab 0   • atorvastatin (LIPITOR) 80 MG tablet TAKE 1 TABLET BY MOUTH EVERY DAY IN THE EVENING 90 Tab 0   • lisinopril (PRINIVIL) 20 MG Tab TAKE 1 TABLET BY MOUTH EVERY DAY 90 Tab 3   • omeprazole (PRILOSEC) 20 MG delayed-release capsule TAKE 1 CAPSULE BY MOUTH EVERY DAY 90 Cap 3   • Probiotic Product (PROBIOTIC DAILY PO) Take  by mouth.     • Calcium Carbonate-Vit D-Min (CALCIUM 1200 PO) Take  by mouth.     • aspirin EC (ECOTRIN) 81 MG TBEC Take 1 Tab by mouth every day. 90 Tab 3   • [DISCONTINUED] furosemide (LASIX) 20 MG Tab TAKE 1/2 TABLET BY MOUTH EVERY DAY 15 Tab 0     No facility-administered encounter medications on file as of 1/4/2021.      Review of Systems   Constitutional: Negative.  Negative for fever, malaise/fatigue and weight loss.   HENT: Negative.  Negative for hearing loss.    Respiratory: Negative.  Negative for shortness of breath.    Cardiovascular: Negative.  Negative for chest pain and palpitations.   Gastrointestinal: Negative.  Negative for heartburn, nausea and vomiting.   Musculoskeletal: Negative.    Skin: Negative.  Negative for rash.   Neurological: Negative.    Endo/Heme/Allergies: Negative.  Does not bruise/bleed easily.   Psychiatric/Behavioral: The patient is nervous/anxious.         Situational stress is that son has chronic liver disease and kidney  disease.  Apparently his liver disease is secondary to alcoholism.  Her son is living with her.  Her daughter  in a fire several years ago.        Objective:   /92 (BP Location: Left arm, Patient Position: Sitting, BP Cuff Size: Adult)   Pulse 65   Resp 14   Ht 1.524 m (5')   Wt 71.3 kg (157 lb 3.2 oz)   SpO2 92%   BMI 30.70 kg/m²     Physical Exam   Constitutional: She is oriented to person, place, and time. No distress.   HENT:   Head: Normocephalic and atraumatic.   Eyes: Pupils are equal, round, and reactive to light. No scleral icterus.   Neck: No JVD present.   Cardiovascular: Normal rate and regular rhythm. Frequent extrasystoles are present.   No murmur heard.  Pulmonary/Chest: No respiratory distress. She has no wheezes.   Abdominal: Soft. She exhibits no distension. There is no abdominal tenderness.   Musculoskeletal:         General: No edema.   Neurological: She is alert and oriented to person, place, and time.   Skin: Skin is warm.   Psychiatric: She has a normal mood and affect.       Assessment:     1. Hx of CABG  Lipid Profile    Comp Metabolic Panel   2. Nonrheumatic mitral valve regurgitation     3. Pulmonary hypertension (HCC)  Lipid Profile    Comp Metabolic Panel   4. Dyslipidemia  Lipid Profile    Comp Metabolic Panel   5. Diastolic congestive heart failure, unspecified HF chronicity (HCC)     6. Edema, unspecified type  furosemide (LASIX) 20 MG Tab       Medical Decision Making:  Today's Assessment / Status / Plan:   Status post CABG: She is now 5 years post surgery and doing well.  No heart failure exam no angina    LDL: She is due for lipid testing    Hypertension: Under excellent control on current hypertensive medications    Situational stress: Due to family illness    Pulmonary hypertension: No evidence of volume overload on exam.  Most recent echo revealed pulmonary pressure of 65 mmHg.    Content new current medical regimen.

## 2021-01-14 DIAGNOSIS — Z23 NEED FOR VACCINATION: ICD-10-CM

## 2021-01-17 DIAGNOSIS — E78.5 DYSLIPIDEMIA: ICD-10-CM

## 2021-01-18 RX ORDER — ATORVASTATIN CALCIUM 80 MG/1
TABLET, FILM COATED ORAL
Qty: 90 TAB | Refills: 0 | Status: SHIPPED | OUTPATIENT
Start: 2021-01-18 | End: 2021-04-12

## 2021-01-18 NOTE — TELEPHONE ENCOUNTER
Requested Prescriptions     Pending Prescriptions Disp Refills   • atorvastatin (LIPITOR) 80 MG tablet [Pharmacy Med Name: ATORVASTATIN 80 MG TABLET] 90 Tab 0     Sig: TAKE 1 TABLET BY MOUTH EVERY DAY IN THE EVENING   Willow Cavanaugh M.D.

## 2021-01-22 DIAGNOSIS — R49.0 HOARSENESS OF VOICE: ICD-10-CM

## 2021-01-22 RX ORDER — OMEPRAZOLE 20 MG/1
CAPSULE, DELAYED RELEASE ORAL
Qty: 90 CAP | Refills: 0 | Status: SHIPPED | OUTPATIENT
Start: 2021-01-22 | End: 2021-04-29

## 2021-01-22 NOTE — TELEPHONE ENCOUNTER
Received request via: Pharmacy    Was the patient seen in the last year in this department? Yes on 12/15/2020    Does the patient have an active prescription (recently filled or refills available) for medication(s) requested? No

## 2021-01-22 NOTE — TELEPHONE ENCOUNTER
Requested Prescriptions     Pending Prescriptions Disp Refills   • omeprazole (PRILOSEC) 20 MG delayed-release capsule [Pharmacy Med Name: OMEPRAZOLE DR 20 MG CAPSULE] 90 Cap 0     Sig: TAKE 1 CAPSULE BY MOUTH EVERY DAY   Willow Cavanaugh M.D.

## 2021-01-24 DIAGNOSIS — R60.9 EDEMA, UNSPECIFIED TYPE: ICD-10-CM

## 2021-01-25 RX ORDER — FUROSEMIDE 20 MG/1
TABLET ORAL
Qty: 15 TAB | Refills: 0 | Status: SHIPPED | OUTPATIENT
Start: 2021-01-25 | End: 2021-06-16

## 2021-04-12 DIAGNOSIS — I10 ESSENTIAL HYPERTENSION: ICD-10-CM

## 2021-04-12 DIAGNOSIS — E78.5 DYSLIPIDEMIA: ICD-10-CM

## 2021-04-12 RX ORDER — ATORVASTATIN CALCIUM 80 MG/1
TABLET, FILM COATED ORAL
Qty: 90 TABLET | Refills: 0 | Status: SHIPPED | OUTPATIENT
Start: 2021-04-12 | End: 2021-07-28 | Stop reason: SDUPTHER

## 2021-04-12 NOTE — TELEPHONE ENCOUNTER
Requested Prescriptions     Pending Prescriptions Disp Refills   • metoprolol tartrate (LOPRESSOR) 25 MG Tab [Pharmacy Med Name: METOPROLOL TARTRATE 25 MG TAB] 90 tablet 0     Sig: TAKE 1/2 TABLET BY MOUTH TWICE A DAY   Willow Cavanaugh M.D.

## 2021-04-12 NOTE — TELEPHONE ENCOUNTER
Requested Prescriptions     Pending Prescriptions Disp Refills   • atorvastatin (LIPITOR) 80 MG tablet [Pharmacy Med Name: ATORVASTATIN 80 MG TABLET] 90 tablet 0     Sig: TAKE 1 TABLET BY MOUTH EVERY DAY IN THE EVENING

## 2021-04-29 DIAGNOSIS — R49.0 HOARSENESS OF VOICE: ICD-10-CM

## 2021-04-29 RX ORDER — OMEPRAZOLE 20 MG/1
CAPSULE, DELAYED RELEASE ORAL
Qty: 90 CAPSULE | Refills: 0 | Status: SHIPPED | OUTPATIENT
Start: 2021-04-29 | End: 2021-07-13

## 2021-04-29 NOTE — TELEPHONE ENCOUNTER
Received request via: Pharmacy    Was the patient seen in the last year in this department? Yes-ESTABLISHING WITH YOU 5/18/2021    Does the patient have an active prescription (recently filled or refills available) for medication(s) requested? No

## 2021-05-18 ENCOUNTER — TELEPHONE (OUTPATIENT)
Dept: MEDICAL GROUP | Facility: PHYSICIAN GROUP | Age: 81
End: 2021-05-18

## 2021-05-18 ENCOUNTER — OFFICE VISIT (OUTPATIENT)
Dept: MEDICAL GROUP | Facility: PHYSICIAN GROUP | Age: 81
End: 2021-05-18
Payer: MEDICARE

## 2021-05-18 VITALS
OXYGEN SATURATION: 93 % | DIASTOLIC BLOOD PRESSURE: 70 MMHG | SYSTOLIC BLOOD PRESSURE: 140 MMHG | HEIGHT: 60 IN | BODY MASS INDEX: 30.63 KG/M2 | TEMPERATURE: 99 F | HEART RATE: 77 BPM | WEIGHT: 156 LBS

## 2021-05-18 DIAGNOSIS — I25.10 CORONARY ARTERY DISEASE INVOLVING NATIVE CORONARY ARTERY OF NATIVE HEART WITHOUT ANGINA PECTORIS: ICD-10-CM

## 2021-05-18 DIAGNOSIS — R09.89 TONSIL PAIN: ICD-10-CM

## 2021-05-18 DIAGNOSIS — I10 ESSENTIAL HYPERTENSION: ICD-10-CM

## 2021-05-18 DIAGNOSIS — D63.1 ANEMIA DUE TO CHRONIC KIDNEY DISEASE, UNSPECIFIED CKD STAGE: ICD-10-CM

## 2021-05-18 DIAGNOSIS — M85.80 OSTEOPENIA, UNSPECIFIED LOCATION: ICD-10-CM

## 2021-05-18 DIAGNOSIS — Z91.81 RISK FOR FALLS: ICD-10-CM

## 2021-05-18 DIAGNOSIS — N18.9 ANEMIA DUE TO CHRONIC KIDNEY DISEASE, UNSPECIFIED CKD STAGE: ICD-10-CM

## 2021-05-18 DIAGNOSIS — E78.5 DYSLIPIDEMIA: ICD-10-CM

## 2021-05-18 DIAGNOSIS — I50.32 CHRONIC DIASTOLIC CONGESTIVE HEART FAILURE (HCC): ICD-10-CM

## 2021-05-18 DIAGNOSIS — R73.02 IMPAIRED GLUCOSE TOLERANCE: ICD-10-CM

## 2021-05-18 DIAGNOSIS — N18.31 STAGE 3A CHRONIC KIDNEY DISEASE: ICD-10-CM

## 2021-05-18 DIAGNOSIS — Z95.1 HX OF CABG: ICD-10-CM

## 2021-05-18 PROBLEM — Z00.00 HEALTHCARE MAINTENANCE: Status: ACTIVE | Noted: 2021-05-18

## 2021-05-18 PROCEDURE — 99204 OFFICE O/P NEW MOD 45 MIN: CPT | Performed by: STUDENT IN AN ORGANIZED HEALTH CARE EDUCATION/TRAINING PROGRAM

## 2021-05-18 RX ORDER — PENICILLIN V POTASSIUM 500 MG/1
500 TABLET ORAL 2 TIMES DAILY
Qty: 20 TABLET | Refills: 0 | Status: SHIPPED | OUTPATIENT
Start: 2021-05-18 | End: 2021-05-28

## 2021-05-18 ASSESSMENT — PATIENT HEALTH QUESTIONNAIRE - PHQ9
SUM OF ALL RESPONSES TO PHQ QUESTIONS 1-9: 4
5. POOR APPETITE OR OVEREATING: 0 - NOT AT ALL
CLINICAL INTERPRETATION OF PHQ2 SCORE: 2

## 2021-05-18 ASSESSMENT — FIBROSIS 4 INDEX: FIB4 SCORE: 1

## 2021-05-18 NOTE — ASSESSMENT & PLAN NOTE
Chronic and ongoing.  Last GFR ~57.  Microalbumin ratio ~3.7.  Denies recent swellings or BP issues.  Avoids NSAIDs and nephrotoxic.  - Continue monitor periodically.

## 2021-05-18 NOTE — ASSESSMENT & PLAN NOTE
Lives in [facility] (for past 6 years). Has limited mobility.   Uses cane or walker, for ambulation.     (poor sensation to left leg, since her CABG).   Would like a walker that she can sit on.   Tired after half a block. (with diastolic heart failure, grade 2).   Also has to stand, for a long time, while waiting for taxis.      (and has problems with that)     (and doesn't have a smart phone for other ride options.).   - place order for 4WW with seat.

## 2021-05-18 NOTE — ASSESSMENT & PLAN NOTE
Patient is noted mild throat irritation, near her left tonsil, for the past couple of months.  She notes feeling irritated, but denies any fevers, or trouble swallowing.  She does have mild pain in the left tonsillar region.  She is concerned about this because she wants to get the Covid vaccine, and is concerned about getting it, while possibly being sick or with tonsillar irritation.  Centor criteria-2.    - Given that she is trying to get Covid vaccine, but will not know until this issue resolves, we will go ahead and try treating with antibiotics.   - PCN - 500 bid, 10 day

## 2021-05-18 NOTE — PROGRESS NOTES
New to Provider  (and [facility])      Valorie Sierra is a 80 y.o. female.    Reason to establish: New patient to establish      Chief Complaint   Patient presents with   • Establish Care   • Hypertension   • Dyslipidemia     -  Prior Dr. Cavanaugh patient.             Problems discussed this visit:    This note uses problem-based documentation.  Subjective HPI is included in Assessment & Plan, at bottom of note.   Problem   Tonsil Pain   Healthcare Maintenance   Anemia Due to Chronic Kidney Disease   CKD (chronic kidney disease) stage 3, GFR 30-59 ml/min ([facility])   Risk for Falls   Impaired Glucose Tolerance   Osteopenia   CAD - Coronary artery disease involving native coronary artery of native heart without angina pectoris   Dyslipidemia   Essential Hypertension   Hx of Cabg   Diastolic Chf ([facility])                          Past Medical History:   Diagnosis Date   • Anemia    • Anxiety    • Arrhythmia    • Arthritis    • Blood transfusion, without reported diagnosis    • CHF (congestive heart failure) ([facility])    • Chronic airway obstruction, not elsewhere classified    • Depression    • Emphysema    • GERD (gastroesophageal reflux disease)    • Headache, classical migraine    • Heart attack ([facility])    • Heart murmur    • Hyperlipidemia    • Hypertension    • IBD (inflammatory bowel disease)    • Kidney disease    • Ulcer    • Unspecified cataract        Past Surgical History:   Procedure Laterality Date   • MULTIPLE CORONARY ARTERY BYPASS ENDO VEIN HARVEST  9/1/2014    Performed by Stephen Nowak M.D. at SURGERY [facility] ORS   • BLADDER NECK CONTRACTURE EXICISION  1970   • GANGLION EXCISION  1969    left wrist   • ABDOMINAL EXPLORATION     • ABDOMINAL HYSTERECTOMY TOTAL      fibroids   • APPENDECTOMY     • DENTAL EXTRACTION(S)         Family History   Problem Relation Age of Onset   • Stroke Mother    • Hyperlipidemia Mother    • Hypertension Mother    • Lung Disease Mother         smoker   • Heart Disease  Father    • Hypertension Father    • Hyperlipidemia Father    • Lung Disease Father         smoker   • Alcohol/Drug Father         etoh   • Other Father         aneurysm   • Genetic Disorder Maternal Aunt         Parkinsons   • Heart Disease Paternal Aunt    • Hypertension Paternal Aunt    • Hyperlipidemia Paternal Aunt    • Psychiatric Illness Paternal Aunt         dementia   • Lung Disease Paternal Aunt         smoker   • Heart Disease Paternal Uncle    • Hypertension Paternal Uncle    • Hyperlipidemia Paternal Uncle    • Arthritis Maternal Grandmother         osteoporosis   • Lung Disease Maternal Grandmother         smoker   • Heart Disease Paternal Grandmother    • Hypertension Paternal Grandmother    • Hyperlipidemia Paternal Grandmother    • Stroke Paternal Grandmother         dvt       Social History     Tobacco Use   • Smoking status: Former Smoker     Packs/day: 3.00     Years: 55.00     Pack years: 165.00     Types: Cigarettes     Quit date: 2007     Years since quittin.7   • Smokeless tobacco: Never Used   Substance Use Topics   • Alcohol use: No     Alcohol/week: 0.0 oz     Comment:        Current medications:  (including new changes):  Current Outpatient Medications   Medication Sig Dispense Refill   • penicillin v potassium (VEETID) 500 MG Tab Take 1 tablet by mouth 2 times a day for 10 days. 20 tablet 0   • omeprazole (PRILOSEC) 20 MG delayed-release capsule TAKE 1 CAPSULE BY MOUTH EVERY DAY 90 capsule 0   • metoprolol tartrate (LOPRESSOR) 25 MG Tab TAKE 1/2 TABLET BY MOUTH TWICE A DAY 90 tablet 0   • atorvastatin (LIPITOR) 80 MG tablet TAKE 1 TABLET BY MOUTH EVERY DAY IN THE EVENING 90 tablet 0   • furosemide (LASIX) 20 MG Tab TAKE 1/2 TABLET BY MOUTH EVERY DAY 15 Tab 0   • lisinopril (PRINIVIL) 20 MG Tab TAKE 1 TABLET BY MOUTH EVERY DAY 90 Tab 3   • Probiotic Product (PROBIOTIC DAILY PO) Take  by mouth.     • Calcium Carbonate-Vit D-Min (CALCIUM 1200 PO) Take  by mouth.      • aspirin EC (ECOTRIN) 81 MG TBEC Take 1 Tab by mouth every day. 90 Tab 3     No current facility-administered medications for this visit.       Allergies as of 05/18/2021 - Reviewed 05/18/2021   Allergen Reaction Noted   • Iodine Itching 09/08/2014   • Oxycodone-acetaminophen Hives 09/08/2014   • Sulfa drugs Vomiting 09/08/2014   • Food  01/08/2020   • Nsaids  08/18/2020                    Review of Symptoms  (as noted elsewhere, and .....)    GEN/CONST:   Denies fever, chills, fatigue,    CARDIO:   Denies chest pain, palpitations, or edema.    RESP:   Denies shortness of breath, wheezing, or coughing.  GI:    Denies nausea, vomiting, diarrhea,     :   Denies dysuria, hematuria,     PSYCH:  Denies anxiety, depression,        Physical Exam  /70   Pulse 77   Temp 37.2 °C (99 °F) (Temporal)   Ht 1.524 m (5')   Wt 70.8 kg (156 lb)   SpO2 93%   BMI 30.47 kg/m²    BP - initially elevated, then decreased to upper normal     (+ arm pain, with use of BP cuff).   General:  Alert and oriented, No apparent distress.  Neck: Supple. No lymphadenopathy noted. Thyroid not enlarged.   Lungs: Clear to auscultation bilaterally.  No wheezes or rales.     Cardiovascular: Regular rate and rhythm.  No gallops.  Abdomen:  Soft.  Non-distended.  Non-tender.     Extremities: No pedal edema.  Good general motion of extremities.    Psychological: Appears to have normal mood and affect.      Labs:  No recent labs.   - ordered.                 Health Maintenance Review     influ vaccine - n/a  Pneumo Vaccine - done.   Tetanus - 8/2012... due in 1 year....   Shingles - old version (2012)...     ... can get at pharmacy, if desired....  Colonoscopy - aged out  Mammogram - aged out  Pap - aged out  Hep.C.screen - n/a  Dexa - reportedly 2016 (osteopenia).    ... may be due within 1 year.....   Labs < 12 mo / met screen - ordered.                                Assessment & Plan  (with subjective history and orders):  80-year-old  female.  Lives in a motel, for the past 6 years.  She had been living there with her son, but her son just passed away (with prior liver failure, and stress for caring for him).  She notes that she is alone now, and does not have many friends.  She does not drive.  She does have one friend, who helps drive her to this appointment.  However, otherwise notes that transportation is difficult for her.  No longer able to use [employer]'s shuttle (previously cancelled).  ...  Can check into [facility] bus.              Of note, the list below is not in a specific nor sortable order.      Problem List Items Addressed This Visit     Anemia due to chronic kidney disease     Prior problem, from chart review.  (appears in remission)  Previously intermittent problem.  Last labs, with H&H in the low end of normal range.  If anemic again, can still get further work-up.  Denies current fatigue, or paleness.  - Can monitor periodically.         CAD - Coronary artery disease involving native coronary artery of native heart without angina pectoris     Chronic and ongoing   Patient has grade 2 diastolic dysfunction (with EF 65%), after having had a CABG in 2014, with CAD and DLD.  She is being managed by cardiology (Dr. Braxton).    She continues on aspirin 81, Lipitor 80, Lasix 10, lisinopril 20, Lopressor 12.5 BID.  Denies:  angina, palpitations, or dyspnea.     - cardiology to continue to manage.          Relevant Orders    Lipid Profile    CKD (chronic kidney disease) stage 3, GFR 30-59 ml/min ([facility])     Chronic and ongoing.  Last GFR ~57.  Microalbumin ratio ~3.7.  Denies recent swellings or BP issues.  Avoids NSAIDs and nephrotoxic.  - Continue monitor periodically.         Diastolic CHF ([facility])     Patient has grade 2 diastolic dysfunction (with EF 65%), after having had a CABG in 2014, with CAD and DLD.  She is being managed by cardiology (Dr. Braxton).    She continues on aspirin 81, Lipitor 80, Lasix 10, lisinopril 20, Lopressor 12.5  BID.  Denies:  angina, palpitations, or dyspnea.    - cardiology to continue to manage.          Relevant Orders    Comp Metabolic Panel    Lipid Profile    MAGNESIUM    Dyslipidemia     Chronic and ongoing.  In setting of CAD, s/p CABG.  Continues aspirin 81 and Lipitor 80.  No muscle cramps, or confusions.    - continue on same.          Essential hypertension     Chronic and ongoing   Patient has grade 2 diastolic dysfunction (with EF 65%), after having had a CABG in 2014, with CAD and DLD.  She is being managed by cardiology (Dr. Braxton).    She continues on aspirin 81, Lipitor 80, Lasix 10, lisinopril 20, Lopressor 12.5 BID.  Denies:  angina, palpitations, or dyspnea.     In office, BP initially elevate at 156/72, but recheck noted 140/70. (but difficult to evaluate, due to patient noting ain with use of BP cuff).   - cardiology to continue to manage.   - recheck HTN / BP in a month, with new labs.          Relevant Orders    Comp Metabolic Panel    MICROALBUMIN CREAT RATIO URINE    TSH WITH REFLEX TO FT4    MAGNESIUM    Hx of CABG     Chronic and ongoing   Patient has grade 2 diastolic dysfunction (with EF 65%), after having had a CABG in 2014, with CAD and DLD.  She is being managed by cardiology (Dr. Braxton).    She continues on aspirin 81, Lipitor 80, Lasix 10, lisinopril 20, Lopressor 12.5 BID.  Denies:  angina, palpitations, or dyspnea.     - cardiology to continue to manage.          Impaired glucose tolerance     Previously noted to have mild elevation in blood sugars, with A1c around 6, in the past.  Has not been checked recently.  Denies:  polyphagia, polydipsia, polyuria.    - will get new A1c with basic labs....         Relevant Orders    HEMOGLOBIN A1C    Osteopenia     Chronic and ongoing.  Previously reported to have osteopenia     in the right femur, in 2016  (unable to find in chart).  Patient continues taking calcium.  Denies bone pains.   - Will obtain vitamin D level.          Relevant Orders     VITAMIN D,25 HYDROXY    Risk for falls     Lives in [facility] (for past 6 years). Has limited mobility.   Uses cane or walker, for ambulation.     (poor sensation to left leg, since her CABG).   Would like a walker that she can sit on.   Tired after half a block. (with diastolic heart failure, grade 2).   Also has to stand, for a long time, while waiting for taxis.      (and has problems with that)     (and doesn't have a smart phone for other ride options.).   - place order for 4WW with seat.          Tonsil pain     Patient is noted mild throat irritation, near her left tonsil, for the past couple of months.  She notes feeling irritated, but denies any fevers, or trouble swallowing.  She does have mild pain in the left tonsillar region.  She is concerned about this because she wants to get the Covid vaccine, and is concerned about getting it, while possibly being sick or with tonsillar irritation.  Centor criteria-2.    - Given that she is trying to get Covid vaccine, but will not know until this issue resolves, we will go ahead and try treating with antibiotics.   - PCN - 500 bid, 10 day         Relevant Medications    penicillin v potassium (VEETID) 500 MG Tab    Other Relevant Orders    CBC WITH DIFFERENTIAL        Of note, the above list is not in a specific nor sortable order.                  Diagnosis Table, with orders:  1. Tonsil pain  penicillin v potassium (VEETID) 500 MG Tab    CBC WITH DIFFERENTIAL   2. Chronic diastolic congestive heart failure (HCC)  Comp Metabolic Panel    Lipid Profile    MAGNESIUM    CANCELED: DME Walker   3. Coronary artery disease involving native coronary artery of native heart without angina pectoris  Lipid Profile   4. Hx of CABG     5. Dyslipidemia     6. Essential hypertension  Comp Metabolic Panel    MICROALBUMIN CREAT RATIO URINE    TSH WITH REFLEX TO FT4    MAGNESIUM   7. Impaired glucose tolerance  HEMOGLOBIN A1C   8. Stage 3a chronic kidney disease (HCC)     9. Anemia  due to chronic kidney disease, unspecified CKD stage     10. Osteopenia, unspecified location  VITAMIN D,25 HYDROXY   11. Risk for falls  CANCELED: DME Walker                   Follow-up:  1 month.  (HTN check. and labs)        (& review copd, and obesity).               A computerized dictation system may have been used in this note.    Despite review, there may be some errors in spelling or grammar.    Thompson Del Toro M.D.  5/18/2021

## 2021-05-18 NOTE — ASSESSMENT & PLAN NOTE
Patient has grade 2 diastolic dysfunction (with EF 65%), after having had a CABG in 2014, with CAD and DLD.  She is being managed by cardiology (Dr. Braxton).    She continues on aspirin 81, Lipitor 80, Lasix 10, lisinopril 20, Lopressor 12.5 BID.  Denies:  angina, palpitations, or dyspnea.    - cardiology to continue to manage.

## 2021-05-18 NOTE — TELEPHONE ENCOUNTER
Please complete order / paperwork, for DME order for:   Four-wheel walker with seat, for patient mobility.     Needed, since limited walking distance (half-block) without need to rest (diastolic HF), and at risk for falls, due to decreased sensation of left foot (since surgery/CABG).     Thank you.   Thompson Del Toro M.D., 5/18/2021

## 2021-05-18 NOTE — ASSESSMENT & PLAN NOTE
Chronic and ongoing.  In setting of CAD, s/p CABG.  Continues aspirin 81 and Lipitor 80.  No muscle cramps, or confusions.    - continue on same.

## 2021-05-18 NOTE — ASSESSMENT & PLAN NOTE
Chronic and ongoing   Patient has grade 2 diastolic dysfunction (with EF 65%), after having had a CABG in 2014, with CAD and DLD.  She is being managed by cardiology (Dr. Braxton).    She continues on aspirin 81, Lipitor 80, Lasix 10, lisinopril 20, Lopressor 12.5 BID.  Denies:  angina, palpitations, or dyspnea.     In office, BP initially elevate at 156/72, but recheck noted 140/70. (but difficult to evaluate, due to patient noting ain with use of BP cuff).   - cardiology to continue to manage.   - recheck HTN / BP in a month, with new labs.

## 2021-05-18 NOTE — ASSESSMENT & PLAN NOTE
Previously noted to have mild elevation in blood sugars, with A1c around 6, in the past.  Has not been checked recently.  Denies:  polyphagia, polydipsia, polyuria.    - will get new A1c with basic labs....

## 2021-05-18 NOTE — PATIENT INSTRUCTIONS
Please get the labs, at some point, before your next visit.   Please get the labs done at least a week prior to your next visit.    Please get them done while fasting   (no food, coffee, or juices, for 8 hours - but water is ok).     Thank you.

## 2021-05-18 NOTE — ASSESSMENT & PLAN NOTE
Chronic and ongoing   Patient has grade 2 diastolic dysfunction (with EF 65%), after having had a CABG in 2014, with CAD and DLD.  She is being managed by cardiology (Dr. Braxton).    She continues on aspirin 81, Lipitor 80, Lasix 10, lisinopril 20, Lopressor 12.5 BID.  Denies:  angina, palpitations, or dyspnea.     - cardiology to continue to manage.

## 2021-05-18 NOTE — ASSESSMENT & PLAN NOTE
Prior problem, from chart review.  (appears in remission)  Previously intermittent problem.  Last labs, with H&H in the low end of normal range.  If anemic again, can still get further work-up.  Denies current fatigue, or paleness.  - Can monitor periodically.

## 2021-05-19 NOTE — ASSESSMENT & PLAN NOTE
influ vaccine - n/a  Pneumo Vaccine - done.   Tetanus - 8/2012... due in 1 year....   Shingles - old version (2012)...     ... can get at pharmacy, if desired....  Colonoscopy - aged out  Mammogram - aged out  Pap - aged out  Hep.C.screen - n/a  Dexa - reportedly 2016 (osteopenia).    ... may be due within 1 year.....   Labs < 12 mo / met screen - ordered.  ...

## 2021-05-19 NOTE — ASSESSMENT & PLAN NOTE
Chronic and ongoing.  Previously reported to have osteopenia     in the right femur, in 2016  (unable to find in chart).  Patient continues taking calcium.  Denies bone pains.   - Will obtain vitamin D level.

## 2021-05-19 NOTE — TELEPHONE ENCOUNTER
Phone Number Called: [phone] ROOM 148 (home)     I called the patient in response to Dr. Del Toro's message regarding the patient's lack of transportation to appointments.  I informed the patient that the [facility] have a , Vicki Leonard, to whom I can refer her, if she desired, and that Vicki would assist her with transportation options.  The patient declined same at this time, stating that her friend, Joseph Mora, would be able to provide transportation.  If at such time Mr. Mora is unable to provide transportation, the patient will call me and I will submit a referral for the .  The patient agreed to this plan and was given my direct phone number.

## 2021-05-19 NOTE — TELEPHONE ENCOUNTER
Please check what options are available for the patient for assistance for transportation.... (Please call and verify how much is convenience vs no access)...      Patient has limited transportation options.  She does not drive.  Does not have many people she can rely on for driving her.  Poor access to buses.??   No longer able to use [employer]'s shuttle (cancelled). ...  Tries taxi, but can have over an hour wait (and not reliable).  Only has old flip-phone, so can not use Uber, or car services...    Is she eligible for RTC waiver?  Pick-ups???      Is there a car service she can use without a smart phone?     Thank you.   Thompson Del Toro M.D., 5/18/2021

## 2021-05-28 ENCOUNTER — HOSPITAL ENCOUNTER (OUTPATIENT)
Dept: LAB | Facility: MEDICAL CENTER | Age: 81
End: 2021-05-28
Attending: STUDENT IN AN ORGANIZED HEALTH CARE EDUCATION/TRAINING PROGRAM
Payer: MEDICARE

## 2021-05-28 DIAGNOSIS — I50.32 CHRONIC DIASTOLIC CONGESTIVE HEART FAILURE (HCC): ICD-10-CM

## 2021-05-28 DIAGNOSIS — I25.10 CORONARY ARTERY DISEASE INVOLVING NATIVE CORONARY ARTERY OF NATIVE HEART WITHOUT ANGINA PECTORIS: ICD-10-CM

## 2021-05-28 DIAGNOSIS — M85.80 OSTEOPENIA, UNSPECIFIED LOCATION: ICD-10-CM

## 2021-05-28 DIAGNOSIS — R73.02 IMPAIRED GLUCOSE TOLERANCE: ICD-10-CM

## 2021-05-28 DIAGNOSIS — I10 ESSENTIAL HYPERTENSION: ICD-10-CM

## 2021-05-28 DIAGNOSIS — R09.89 TONSIL PAIN: ICD-10-CM

## 2021-05-28 LAB
ALBUMIN SERPL BCP-MCNC: 4.2 G/DL (ref 3.2–4.9)
ALBUMIN/GLOB SERPL: 1.7 G/DL
ALP SERPL-CCNC: 58 U/L (ref 30–99)
ALT SERPL-CCNC: 17 U/L (ref 2–50)
ANION GAP SERPL CALC-SCNC: 9 MMOL/L (ref 7–16)
AST SERPL-CCNC: 18 U/L (ref 12–45)
BASOPHILS # BLD AUTO: 0.5 % (ref 0–1.8)
BASOPHILS # BLD: 0.04 K/UL (ref 0–0.12)
BILIRUB SERPL-MCNC: 0.5 MG/DL (ref 0.1–1.5)
BUN SERPL-MCNC: 26 MG/DL (ref 8–22)
CALCIUM SERPL-MCNC: 9.5 MG/DL (ref 8.5–10.5)
CHLORIDE SERPL-SCNC: 106 MMOL/L (ref 96–112)
CHOLEST SERPL-MCNC: 160 MG/DL (ref 100–199)
CO2 SERPL-SCNC: 25 MMOL/L (ref 20–33)
CREAT SERPL-MCNC: 0.89 MG/DL (ref 0.5–1.4)
CREAT UR-MCNC: 87.16 MG/DL
EOSINOPHIL # BLD AUTO: 0.19 K/UL (ref 0–0.51)
EOSINOPHIL NFR BLD: 2.5 % (ref 0–6.9)
ERYTHROCYTE [DISTWIDTH] IN BLOOD BY AUTOMATED COUNT: 50.6 FL (ref 35.9–50)
EST. AVERAGE GLUCOSE BLD GHB EST-MCNC: 140 MG/DL
FASTING STATUS PATIENT QL REPORTED: NORMAL
GLOBULIN SER CALC-MCNC: 2.5 G/DL (ref 1.9–3.5)
GLUCOSE SERPL-MCNC: 114 MG/DL (ref 65–99)
HBA1C MFR BLD: 6.5 % (ref 4–5.6)
HCT VFR BLD AUTO: 40 % (ref 37–47)
HDLC SERPL-MCNC: 76 MG/DL
HGB BLD-MCNC: 13 G/DL (ref 12–16)
IMM GRANULOCYTES # BLD AUTO: 0.03 K/UL (ref 0–0.11)
IMM GRANULOCYTES NFR BLD AUTO: 0.4 % (ref 0–0.9)
LDLC SERPL CALC-MCNC: 70 MG/DL
LYMPHOCYTES # BLD AUTO: 2.2 K/UL (ref 1–4.8)
LYMPHOCYTES NFR BLD: 28.5 % (ref 22–41)
MAGNESIUM SERPL-MCNC: 1.9 MG/DL (ref 1.5–2.5)
MCH RBC QN AUTO: 31.1 PG (ref 27–33)
MCHC RBC AUTO-ENTMCNC: 32.5 G/DL (ref 33.6–35)
MCV RBC AUTO: 95.7 FL (ref 81.4–97.8)
MICROALBUMIN UR-MCNC: 1.3 MG/DL
MICROALBUMIN/CREAT UR: 15 MG/G (ref 0–30)
MONOCYTES # BLD AUTO: 0.77 K/UL (ref 0–0.85)
MONOCYTES NFR BLD AUTO: 10 % (ref 0–13.4)
NEUTROPHILS # BLD AUTO: 4.49 K/UL (ref 2–7.15)
NEUTROPHILS NFR BLD: 58.1 % (ref 44–72)
NRBC # BLD AUTO: 0 K/UL
NRBC BLD-RTO: 0 /100 WBC
PLATELET # BLD AUTO: 235 K/UL (ref 164–446)
PMV BLD AUTO: 10.6 FL (ref 9–12.9)
POTASSIUM SERPL-SCNC: 4.8 MMOL/L (ref 3.6–5.5)
PROT SERPL-MCNC: 6.7 G/DL (ref 6–8.2)
RBC # BLD AUTO: 4.18 M/UL (ref 4.2–5.4)
SODIUM SERPL-SCNC: 140 MMOL/L (ref 135–145)
TRIGL SERPL-MCNC: 71 MG/DL (ref 0–149)
TSH SERPL DL<=0.005 MIU/L-ACNC: 4.84 UIU/ML (ref 0.38–5.33)
WBC # BLD AUTO: 7.7 K/UL (ref 4.8–10.8)

## 2021-05-28 PROCEDURE — 36415 COLL VENOUS BLD VENIPUNCTURE: CPT

## 2021-05-28 PROCEDURE — 83036 HEMOGLOBIN GLYCOSYLATED A1C: CPT | Mod: GA

## 2021-05-28 PROCEDURE — 85025 COMPLETE CBC W/AUTO DIFF WBC: CPT

## 2021-05-28 PROCEDURE — 84443 ASSAY THYROID STIM HORMONE: CPT

## 2021-05-28 PROCEDURE — 82570 ASSAY OF URINE CREATININE: CPT

## 2021-05-28 PROCEDURE — 82306 VITAMIN D 25 HYDROXY: CPT

## 2021-05-28 PROCEDURE — 83735 ASSAY OF MAGNESIUM: CPT

## 2021-05-28 PROCEDURE — 80061 LIPID PANEL: CPT

## 2021-05-28 PROCEDURE — 82043 UR ALBUMIN QUANTITATIVE: CPT

## 2021-05-28 PROCEDURE — 80053 COMPREHEN METABOLIC PANEL: CPT

## 2021-05-31 LAB — 25(OH)D3 SERPL-MCNC: 27 NG/ML (ref 30–80)

## 2021-06-02 NOTE — RESULT ENCOUNTER NOTE
Normal - Lipids, CBC, TSH (4.84), Mag,   Normal - GFR (>60, was 40-50's), MicroAlb-r-15 (was 40-50's)  Vit.D-27.   Glucose-114.  A1c-6.5.. (new DM-2)..!!  ... (will review in office in 1-2 weeks).

## 2021-06-07 ENCOUNTER — TELEPHONE (OUTPATIENT)
Dept: CARDIOLOGY | Facility: MEDICAL CENTER | Age: 81
End: 2021-06-07

## 2021-06-07 NOTE — TELEPHONE ENCOUNTER
RS    Pt called stating she wanted to let RS know that his care has helped her so much over the years and that she appreciates him very much and hopes he has a wonderful FPC.

## 2021-06-14 ENCOUNTER — TELEPHONE (OUTPATIENT)
Dept: MEDICAL GROUP | Facility: PHYSICIAN GROUP | Age: 81
End: 2021-06-14

## 2021-06-14 NOTE — TELEPHONE ENCOUNTER
Please inform DME company that prior note also reference risks for falls, and HF (which causes shortness of breath, and need for rest, which can not be done with a cane).  Thank you.

## 2021-06-14 NOTE — TELEPHONE ENCOUNTER
VOICEMAIL  1. Caller Name: Andria / [facility]                      Call Back Number: [phone]      2. Message: Andria stated need documentation that the Pt cannot use a cane for walking or be Stable.    3. Patient approves office to leave a detailed voicemail/MyChart message: N\A

## 2021-06-15 NOTE — TELEPHONE ENCOUNTER
Can not adjust prior notes.   Might not be needed in the home.   Has upcoming appointment, so will discuss then.   Thompson Del Toro M.D., 6/15/2021

## 2021-06-15 NOTE — TELEPHONE ENCOUNTER
Phone Number Called: [facility] at [facility]    Call outcome: Left detailed message for patient. Informed to call back with any additional questions.    Message: LVM letting Andria that Pt has an upcoming appointment and the issue medicare is requiring will be discussed at the up comming visit if still need 4 wheel walker.    Other Possible option is sending a Rx to [facility]. They assist Pt who cannot afford DME equipment as well as medication programs.

## 2021-06-15 NOTE — TELEPHONE ENCOUNTER
Phone Number Called: [phone]    Call outcome: Spoke with Andria at [facility]    Message: Per Andria Medicare requires a rule out of the use of a cane and standard walker in the Home, and how the patient can benefit from the 4WW with seat in the home, has to be in chart notes. Medicare does not pay for 4WW to be used pacifically outside the home.   Current chart notes will need to be amended and refax to Andria at Fax # [phone].    Thank you

## 2021-06-16 ENCOUNTER — OFFICE VISIT (OUTPATIENT)
Dept: MEDICAL GROUP | Facility: PHYSICIAN GROUP | Age: 81
End: 2021-06-16
Payer: MEDICARE

## 2021-06-16 VITALS
DIASTOLIC BLOOD PRESSURE: 74 MMHG | HEART RATE: 62 BPM | OXYGEN SATURATION: 95 % | BODY MASS INDEX: 31.41 KG/M2 | TEMPERATURE: 97.5 F | WEIGHT: 160 LBS | HEIGHT: 60 IN | SYSTOLIC BLOOD PRESSURE: 204 MMHG

## 2021-06-16 DIAGNOSIS — Z79.899 MEDICATION MANAGEMENT: ICD-10-CM

## 2021-06-16 DIAGNOSIS — I10 ESSENTIAL HYPERTENSION: ICD-10-CM

## 2021-06-16 DIAGNOSIS — R60.9 EDEMA, UNSPECIFIED TYPE: ICD-10-CM

## 2021-06-16 DIAGNOSIS — J43.1 PANLOBULAR EMPHYSEMA (HCC): ICD-10-CM

## 2021-06-16 DIAGNOSIS — E11.9 TYPE 2 DIABETES MELLITUS WITHOUT COMPLICATION, WITHOUT LONG-TERM CURRENT USE OF INSULIN (HCC): ICD-10-CM

## 2021-06-16 PROCEDURE — 99214 OFFICE O/P EST MOD 30 MIN: CPT | Performed by: STUDENT IN AN ORGANIZED HEALTH CARE EDUCATION/TRAINING PROGRAM

## 2021-06-16 RX ORDER — ALBUTEROL SULFATE 90 UG/1
2 AEROSOL, METERED RESPIRATORY (INHALATION) EVERY 4 HOURS PRN
Qty: 1 EACH | Refills: 3 | Status: SHIPPED | OUTPATIENT
Start: 2021-06-16

## 2021-06-16 RX ORDER — LISINOPRIL 40 MG/1
40 TABLET ORAL DAILY
Qty: 90 TABLET | Refills: 1 | Status: SHIPPED | OUTPATIENT
Start: 2021-06-16 | End: 2021-11-29

## 2021-06-16 RX ORDER — FUROSEMIDE 20 MG/1
10 TABLET ORAL DAILY
Qty: 45 TABLET | Refills: 1 | Status: SHIPPED | OUTPATIENT
Start: 2021-06-16 | End: 2022-02-08

## 2021-06-16 ASSESSMENT — FIBROSIS 4 INDEX: FIB4 SCORE: 1.5

## 2021-06-16 NOTE — ASSESSMENT & PLAN NOTE
Chronic and ongoing.  Previous PFTs consistent with COPD, 12/2017.  Previously been on Symbicort;     however, insurance stopped paying for it.       Therefore, she stopped taking it (too expensive).  Has not been on inhaler recently.    Will prescribe Advair, in hopes that it is more affordable.     Discussed possible GoodRx, but even if it is $100/ month,     she may not use this.  Therefore, will prescribe, and see      if covered by insurance.  - Start Advair, BID   - can use Albuterol- PRN.

## 2021-06-16 NOTE — ASSESSMENT & PLAN NOTE
Chronic and ongoing   ... with new worsening....   In setting of grade 2 diastolic dysfunction (with EF 65%),     had a CABG in 2014, with CAD and DLD.       Was managed by cardiology (Dr. Braxton - retiring...).    She continues on  aspirin 81, Lipitor 80, as well as      - Lasix 10, (... now states had been using 3 x/wk- MWF)     - Lisinopril 20,       - Lopressor 12.5 BID.  Denies:  angina, palpitations, but with some dyspnea.   Does not measure BP at home.   Last visit with mild elevation of BP (156/72, then 140/72).  Today - More elevate - 210/70, 204/74.    Cardiologist retiring, and yet to schedule follow-up.   - will increase Lisinopril to 40 mg.   - will increase Lasix 10, to every day.  - continue on Lopressor 12.5 BID.  - return in 3 weeks (with labs 1 week before).

## 2021-06-16 NOTE — PROGRESS NOTES
Established Patient     Valorie Sierra is a 80 y.o. female.    Chief Complaint   Patient presents with   • Lab Results   • Hypertension Follow-up             Problems addressed this visit:     This note uses problem-based documentation.  Subjective HPI is included in Assessment & Plan, at bottom of note.   Problem   DM (Diabetes Mellitus), Type 2 (Hcc)   COPD (Chronic Obstructive Pulmonary Disease) ([facility])   Essential Hypertension                            Past Medical History:   Diagnosis Date   • Anemia    • Anxiety    • Arrhythmia    • Arthritis    • Blood transfusion, without reported diagnosis    • CHF (congestive heart failure) ([facility])    • Chronic airway obstruction, not elsewhere classified    • Depression    • Emphysema    • GERD (gastroesophageal reflux disease)    • Headache, classical migraine    • Heart attack ([facility])    • Heart murmur    • Hyperlipidemia    • Hypertension    • IBD (inflammatory bowel disease)    • Kidney disease    • Ulcer    • Unspecified cataract        Patient Active Problem List   Diagnosis   • Diastolic CHF ([facility])   • Dyslipidemia   • COPD (chronic obstructive pulmonary disease) ([facility])   • Essential hypertension   • Hx of CABG   • CAD - Coronary artery disease involving native coronary artery of native heart without angina pectoris   • Osteopenia   • Risk for falls   • Impaired glucose tolerance   • Pulmonary hypertension ([facility])   • Mitral insufficiency   • CKD (chronic kidney disease) stage 3, GFR 30-59 ml/min ([facility])   • Anemia due to chronic kidney disease   • Tonsil pain   • Healthcare maintenance   • DM (diabetes mellitus), type 2 (HCC)       Past Surgical History:   Procedure Laterality Date   • MULTIPLE CORONARY ARTERY BYPASS ENDO VEIN HARVEST  9/1/2014    Performed by Stephen Nowak M.D. at SURGERY [facility] ORS   • BLADDER NECK CONTRACTURE EXICISION  1970   • GANGLION EXCISION  1969    left wrist   • ABDOMINAL EXPLORATION     • ABDOMINAL HYSTERECTOMY TOTAL      fibroids   •  APPENDECTOMY     • DENTAL EXTRACTION(S)         Family History   Problem Relation Age of Onset   • Stroke Mother    • Hyperlipidemia Mother    • Hypertension Mother    • Lung Disease Mother         smoker   • Heart Disease Father    • Hypertension Father    • Hyperlipidemia Father    • Lung Disease Father         smoker   • Alcohol/Drug Father         etoh   • Other Father         aneurysm       Social History     Tobacco Use   • Smoking status: Former Smoker     Packs/day: 3.00     Years: 55.00     Pack years: 165.00     Types: Cigarettes     Quit date: 2007     Years since quittin.8   • Smokeless tobacco: Never Used   • Tobacco comment: Quit   (Hx 3ppd x 55yr)...    Substance Use Topics   • Alcohol use: No     Alcohol/week: 0.0 oz       Current medications:  (including new changes):  Current Outpatient Medications   Medication Sig Dispense Refill   • fluticasone-salmeterol (ADVAIR) 250-50 MCG/DOSE AEROSOL POWDER, BREATH ACTIVATED Inhale 1 Puff every 12 hours. 1 Each 3   • lisinopril (PRINIVIL) 40 MG tablet Take 1 tablet by mouth every day. 90 tablet 1   • furosemide (LASIX) 20 MG Tab Take 0.5 Tablets by mouth every day. 45 tablet 1   • albuterol 108 (90 Base) MCG/ACT Aero Soln inhalation aerosol Inhale 2 Puffs every four hours as needed (Emergency Rescue use for - Shortness of Breath, or Wheezing.). 1 Each 3   • omeprazole (PRILOSEC) 20 MG delayed-release capsule TAKE 1 CAPSULE BY MOUTH EVERY DAY 90 capsule 0   • metoprolol tartrate (LOPRESSOR) 25 MG Tab TAKE 1/2 TABLET BY MOUTH TWICE A DAY 90 tablet 0   • atorvastatin (LIPITOR) 80 MG tablet TAKE 1 TABLET BY MOUTH EVERY DAY IN THE EVENING 90 tablet 0   • Probiotic Product (PROBIOTIC DAILY PO) Take  by mouth.     • Calcium Carbonate-Vit D-Min (CALCIUM 1200 PO) Take  by mouth.     • aspirin EC (ECOTRIN) 81 MG TBEC Take 1 Tab by mouth every day. 90 Tab 3     No current facility-administered medications for this visit.       Allergies as of 2021 -  Reviewed 06/16/2021   Allergen Reaction Noted   • Iodine Itching 09/08/2014   • Oxycodone-acetaminophen Hives 09/08/2014   • Sulfa drugs Vomiting 09/08/2014   • Food  01/08/2020   • Nsaids  08/18/2020                 Review of Symptoms   (as noted elsewhere, and .....)   GEN/CONST:   Denies fever, chills, fatigue,      + but feeling more tired (not quite fatigue)  CARDIO:   Denies chest pain, palpitations,      + larger legs, but not clear edema.      + Dyspnea on exertion  RESP:   Denies shortness of breath (at rest), wheezing.     + Dyspnea on exertion  GI:    Denies nausea, vomiting, diarrhea,             Physical Exam  BP (!) 204/74   Pulse 62   Temp 36.4 °C (97.5 °F) (Temporal)   Ht 1.524 m (5')   Wt 72.6 kg (160 lb)   SpO2 95%   BMI 31.25 kg/m²      (+ arm pain, with use of BP cuff).   General:  Alert and oriented, No apparent distress.    Lungs: Clear to auscultation bilaterally.  No wheezes or rales.  Cardiovascular: Regular rate and rhythm.  No murmurs, rubs or gallops.  Abdomen:  Soft.  Non-tender.  No rebound or guarding.   Extremities:  Large legs, but no pitting edema.           Labs:  Recent / Prior labs reviewed.    CBC, CMP, Lipids, TSH, A1c, micro-Albumin and Vit.D                             Assessment & Plan  (with subjective history and orders):  Of note, the list below is not in a specific nor sortable order.      Problem List Items Addressed This Visit     COPD (chronic obstructive pulmonary disease) (HCC)     Chronic and ongoing.  Previous PFTs consistent with COPD, 12/2017.  Previously been on Symbicort;     however, insurance stopped paying for it.       Therefore, she stopped taking it (too expensive).  Has not been on inhaler recently.    Will prescribe Advair, in hopes that it is more affordable.     Discussed possible GoodRx, but even if it is $100/ month,     she may not use this.  Therefore, will prescribe, and see      if covered by insurance.  - Start Advair, BID           Relevant Medications    fluticasone-salmeterol (ADVAIR) 250-50 MCG/DOSE AEROSOL POWDER, BREATH ACTIVATED    albuterol 108 (90 Base) MCG/ACT Aero Soln inhalation aerosol    DM (diabetes mellitus), type 2 (HCC)     New diagnosis.  A1c, just recently reached 6.5, on new labs.  Upset and disappointed at diagnosis.  Denies:  polyphagia, polydipsia, polyuria.    Does not want more medications.  - We will try diet and exercise, for now.  - Reevaluate in 6 months, for possible medication.         Essential hypertension     Chronic and ongoing   ... with new worsening....   In setting of grade 2 diastolic dysfunction (with EF 65%),     had a CABG in 2014, with CAD and DLD.       Was managed by cardiology (Dr. Braxton - retiring...).    She continues on  aspirin 81, Lipitor 80, as well as      - Lasix 10, (... now states had been using 3 x/wk- MWF)     - Lisinopril 20,       - Lopressor 12.5 BID.  Denies:  angina, palpitations, but with some dyspnea.   Does not measure BP at home.   Last visit with mild elevation of BP (156/72, then 140/72).  Today - More elevate - 210/70, 204/74.    Cardiologist retiring, and yet to schedule follow-up.   - will increase Lisinopril to 40 mg.   - will increase Lasix 10, to every day.  - continue on Lopressor 12.5 BID.  - return in 3 weeks (with labs 1 week before).          Relevant Medications    lisinopril (PRINIVIL) 40 MG tablet    furosemide (LASIX) 20 MG Tab    Other Relevant Orders    Basic Metabolic Panel      Other Visit Diagnoses     Edema, unspecified type        Relevant Medications    furosemide (LASIX) 20 MG Tab    Medication management        Relevant Orders    Basic Metabolic Panel      Of note, the above list is not in a specific nor sortable order.                  Diagnosis Table, with orders:  1. Essential hypertension  lisinopril (PRINIVIL) 40 MG tablet    furosemide (LASIX) 20 MG Tab    Basic Metabolic Panel   2. Panlobular emphysema (HCC)  fluticasone-salmeterol (ADVAIR)  250-50 MCG/DOSE AEROSOL POWDER, BREATH ACTIVATED    albuterol 108 (90 Base) MCG/ACT Aero Soln inhalation aerosol   3. Type 2 diabetes mellitus without complication, without long-term current use of insulin ([facility])     4. Edema, unspecified type  furosemide (LASIX) 20 MG Tab   5. Medication management  Basic Metabolic Panel                   Follow-up:  3 weeks  (HTN, and lab).               A computerized dictation system may have been used in this note.    Despite review, there may be some errors in spelling or grammar.    Thompson Del Toro M.D.  6/16/2021

## 2021-06-16 NOTE — PATIENT INSTRUCTIONS
Please try the new prescription for Advair (to see if it is covered by insurance).     Please increase the Lisinopril to 40 mg (new prescription is for the higher dose pill).     Please increase the Lasix (furosimide) to Half a tablet (for 10 mg) DAILY.         Please get the labs in about 2 weeks.  Please get the labs done at least a week prior to your next visit.    Please get them done while fasting   (no food, coffee, or juices, for 8 hours - but water is ok).     Please follow-up in 3 weeks.     Thank you.

## 2021-06-16 NOTE — ASSESSMENT & PLAN NOTE
New diagnosis.  A1c, just recently reached 6.5, on new labs.  Upset and disappointed at diagnosis.  Denies:  polyphagia, polydipsia, polyuria.    Does not want more medications.  - We will try diet and exercise, for now.  - Reevaluate in 6 months, for possible medication.

## 2021-06-22 ENCOUNTER — TELEPHONE (OUTPATIENT)
Dept: MEDICAL GROUP | Facility: PHYSICIAN GROUP | Age: 81
End: 2021-06-22

## 2021-06-22 NOTE — TELEPHONE ENCOUNTER
Phone Number Called: [phone] (mobile)     I called and spoke to the patient regarding the need to follow-up for an MA BP check.  The patient said that she will be coming to the office tomorrow or the following day to get lab work done and will also get an MA BP check at the same visit.  The patient reports that  the swelling in her feet is less this morning, however, she anticipates it will increase with activity.

## 2021-06-23 ENCOUNTER — NON-PROVIDER VISIT (OUTPATIENT)
Dept: MEDICAL GROUP | Facility: PHYSICIAN GROUP | Age: 81
End: 2021-06-23
Payer: MEDICARE

## 2021-06-23 ENCOUNTER — HOSPITAL ENCOUNTER (OUTPATIENT)
Dept: LAB | Facility: MEDICAL CENTER | Age: 81
End: 2021-06-23
Attending: STUDENT IN AN ORGANIZED HEALTH CARE EDUCATION/TRAINING PROGRAM
Payer: MEDICARE

## 2021-06-23 VITALS — HEART RATE: 58 BPM | DIASTOLIC BLOOD PRESSURE: 68 MMHG | SYSTOLIC BLOOD PRESSURE: 174 MMHG | OXYGEN SATURATION: 94 %

## 2021-06-23 DIAGNOSIS — I10 ESSENTIAL HYPERTENSION: ICD-10-CM

## 2021-06-23 DIAGNOSIS — Z79.899 MEDICATION MANAGEMENT: ICD-10-CM

## 2021-06-23 LAB
ANION GAP SERPL CALC-SCNC: 8 MMOL/L (ref 7–16)
BUN SERPL-MCNC: 23 MG/DL (ref 8–22)
CALCIUM SERPL-MCNC: 10 MG/DL (ref 8.5–10.5)
CHLORIDE SERPL-SCNC: 106 MMOL/L (ref 96–112)
CO2 SERPL-SCNC: 27 MMOL/L (ref 20–33)
CREAT SERPL-MCNC: 0.84 MG/DL (ref 0.5–1.4)
GLUCOSE SERPL-MCNC: 108 MG/DL (ref 65–99)
POTASSIUM SERPL-SCNC: 5.4 MMOL/L (ref 3.6–5.5)
SODIUM SERPL-SCNC: 141 MMOL/L (ref 135–145)

## 2021-06-23 PROCEDURE — 36415 COLL VENOUS BLD VENIPUNCTURE: CPT

## 2021-06-23 PROCEDURE — 80048 BASIC METABOLIC PNL TOTAL CA: CPT

## 2021-06-23 NOTE — PROGRESS NOTES
Valorie Sierra is a 80 y.o. female here for a non-provider visit for bp check    Vitals:    06/23/21 0916   BP: (!) 174/68   BP Location: Left arm   Patient Position: Sitting   BP Cuff Size: Adult   Pulse: (!) 58   SpO2: 94%     If abnormal, was the Registered Nurse (office provider if RN is unavailable) notified today? Yes    Routed to PCP? Yes

## 2021-06-30 ENCOUNTER — OFFICE VISIT (OUTPATIENT)
Dept: MEDICAL GROUP | Facility: PHYSICIAN GROUP | Age: 81
End: 2021-06-30
Payer: MEDICARE

## 2021-06-30 VITALS
HEART RATE: 63 BPM | DIASTOLIC BLOOD PRESSURE: 70 MMHG | WEIGHT: 153 LBS | SYSTOLIC BLOOD PRESSURE: 150 MMHG | BODY MASS INDEX: 30.04 KG/M2 | TEMPERATURE: 98.3 F | OXYGEN SATURATION: 96 % | RESPIRATION RATE: 16 BRPM | HEIGHT: 60 IN

## 2021-06-30 DIAGNOSIS — I10 ESSENTIAL HYPERTENSION: ICD-10-CM

## 2021-06-30 PROCEDURE — 99213 OFFICE O/P EST LOW 20 MIN: CPT | Performed by: STUDENT IN AN ORGANIZED HEALTH CARE EDUCATION/TRAINING PROGRAM

## 2021-06-30 RX ORDER — AMLODIPINE BESYLATE 2.5 MG/1
2.5 TABLET ORAL DAILY
Qty: 30 TABLET | Refills: 1 | Status: SHIPPED | OUTPATIENT
Start: 2021-06-30 | End: 2021-07-28 | Stop reason: SDUPTHER

## 2021-06-30 ASSESSMENT — FIBROSIS 4 INDEX: FIB4 SCORE: 1.5

## 2021-06-30 NOTE — PROGRESS NOTES
Established Patient     Valorie Sierra is a 80 y.o. female.    Chief Complaint   Patient presents with   • Hypertension   • Medication Management               Problems addressed this visit:     This note uses problem-based documentation.  Subjective HPI is included in Assessment & Plan, at bottom of note.   Problem   Essential Hypertension                            Past Medical History:   Diagnosis Date   • Anemia    • Anxiety    • Arrhythmia    • Arthritis    • Blood transfusion, without reported diagnosis    • CHF (congestive heart failure) ([facility])    • Chronic airway obstruction, not elsewhere classified    • Depression    • Emphysema    • GERD (gastroesophageal reflux disease)    • Headache, classical migraine    • Heart attack ([facility])    • Heart murmur    • Hyperlipidemia    • Hypertension    • IBD (inflammatory bowel disease)    • Kidney disease    • Ulcer    • Unspecified cataract        Patient Active Problem List   Diagnosis   • Diastolic CHF ([facility])   • Dyslipidemia   • COPD (chronic obstructive pulmonary disease) ([facility])   • Essential hypertension   • Hx of CABG   • CAD - Coronary artery disease involving native coronary artery of native heart without angina pectoris   • Osteopenia   • Risk for falls   • Impaired glucose tolerance   • Pulmonary hypertension ([facility])   • Mitral insufficiency   • CKD (chronic kidney disease) stage 3, GFR 30-59 ml/min ([facility])   • Anemia due to chronic kidney disease   • Tonsil pain   • Healthcare maintenance   • DM (diabetes mellitus), type 2 ([facility])       Past Surgical History:   Procedure Laterality Date   • MULTIPLE CORONARY ARTERY BYPASS ENDO VEIN HARVEST  9/1/2014    Performed by Stephen Nowak M.D. at SURGERY [facility]   • BLADDER NECK CONTRACTURE EXICISION  1970   • GANGLION EXCISION  1969    left wrist   • ABDOMINAL EXPLORATION     • ABDOMINAL HYSTERECTOMY TOTAL      fibroids   • APPENDECTOMY     • DENTAL EXTRACTION(S)         Family History   Problem Relation Age of  Onset   • Stroke Mother    • Hyperlipidemia Mother    • Hypertension Mother    • Lung Disease Mother         smoker   • Heart Disease Father    • Hypertension Father    • Hyperlipidemia Father    • Lung Disease Father         smoker   • Alcohol/Drug Father         etoh   • Other Father         aneurysm       Social History     Tobacco Use   • Smoking status: Former Smoker     Packs/day: 3.00     Years: 55.00     Pack years: 165.00     Types: Cigarettes     Quit date: 2007     Years since quittin.8   • Smokeless tobacco: Never Used   • Tobacco comment: Quit   (Hx 3ppd x 55yr)...    Substance Use Topics   • Alcohol use: No     Alcohol/week: 0.0 oz       Current medications:  (including new changes):  Current Outpatient Medications   Medication Sig Dispense Refill   • Fluticasone-Salmeterol (WIXELA INHUB) 250-50 MCG/DOSE AEROSOL POWDER, BREATH ACTIVATED Inhale 1 Inhalation every 12 hours.     • amLODIPine (NORVASC) 2.5 MG Tab Take 1 tablet by mouth every day. 30 tablet 1   • lisinopril (PRINIVIL) 40 MG tablet Take 1 tablet by mouth every day. 90 tablet 1   • furosemide (LASIX) 20 MG Tab Take 0.5 Tablets by mouth every day. 45 tablet 1   • albuterol 108 (90 Base) MCG/ACT Aero Soln inhalation aerosol Inhale 2 Puffs every four hours as needed (Emergency Rescue use for - Shortness of Breath, or Wheezing.). 1 Each 3   • omeprazole (PRILOSEC) 20 MG delayed-release capsule TAKE 1 CAPSULE BY MOUTH EVERY DAY 90 capsule 0   • metoprolol tartrate (LOPRESSOR) 25 MG Tab TAKE 1/2 TABLET BY MOUTH TWICE A DAY 90 tablet 0   • atorvastatin (LIPITOR) 80 MG tablet TAKE 1 TABLET BY MOUTH EVERY DAY IN THE EVENING 90 tablet 0   • Probiotic Product (PROBIOTIC DAILY PO) Take  by mouth.     • Calcium Carbonate-Vit D-Min (CALCIUM 1200 PO) Take  by mouth.     • aspirin EC (ECOTRIN) 81 MG TBEC Take 1 Tab by mouth every day. 90 Tab 3     No current facility-administered medications for this visit.       Allergies as of 2021 -  Reviewed 06/30/2021   Allergen Reaction Noted   • Iodine Itching 09/08/2014   • Oxycodone-acetaminophen Hives 09/08/2014   • Sulfa drugs Vomiting 09/08/2014   • Food  01/08/2020   • Nsaids  08/18/2020                   Review of Symptoms   (as noted elsewhere, and .....)   GEN/CONST:   Denies fever, chills, fatigue,   CARDIO:   Denies chest pain, palpitations, or edema.    RESP:   Denies shortness of breath, wheezing, or coughing.  GI:    Denies nausea, vomiting, diarrhea,           Physical Exam  /70   Pulse 63   Temp 36.8 °C (98.3 °F) (Temporal)   Resp 16   Ht 1.524 m (5')   Wt 69.4 kg (153 lb)   SpO2 96%   BMI 29.88 kg/m²   General:  Alert and oriented, No apparent distress.    Lungs: Clear to auscultation bilaterally.  No wheezes or rales.  Cardiovascular: Regular rate and rhythm.  No murmurs, rubs or gallops.  Abdomen:  Soft.  Non-tender.  No rebound or guarding.   Extremities:  No pedal edema.  Good general motion of extremities.   Psychological: Appears to have normal mood and affect.        Labs:  Recent / Prior labs reviewed.  -  BMP                             Assessment & Plan  (with subjective history and orders):  Of note, the list below is not in a specific nor sortable order.      Problem List Items Addressed This Visit     Essential hypertension     Chronic and ongoing   Patient has grade 2 diastolic dysfunction (with EF 65%), after having had a CABG in 2014, with CAD and DLD.  She is being managed by cardiology (Dr. Braxton, but retiring, and needs new cardiologist....).    Previously increased lisinopril and Lasix....  Currently on aspirin 81, Lipitor 80, and      Lasix 10 d, lisinopril 40 d, Lopressor 12.5 BID.  Denies:  angina, palpitations, or dyspnea.     Notes improvement in prior sensation of leg swelling.  Today, with improved BP -160/80, 150/70.  Is on low-dose lopressor, but HR only 63.   Although she has HF, she does have      diastolic dysfunction (grade 2), and      need for  better blood pressure management.  - Will start on low-dose amlodipine 2.5.  - follow-up in a few weeks.  ... with labs (as prior potassium at upper limit).   ... if high potassium, or SE for amlodipine,         then might increase lisinopril.          Relevant Medications    amLODIPine (NORVASC) 2.5 MG Tab    Other Relevant Orders    Basic Metabolic Panel                       Diagnosis Table, with orders:  1. Essential hypertension  amLODIPine (NORVASC) 2.5 MG Tab    Basic Metabolic Panel                 Follow-up:  4 weeks - HTN (and K ? / Labs)    (and plan for DM-2 follow-up / screenings)              A computerized dictation system may have been used in this note.    Despite review, there may be some errors in spelling or grammar.    Thompson Del Toro M.D.  6/30/2021

## 2021-06-30 NOTE — ASSESSMENT & PLAN NOTE
Chronic and ongoing   Patient has grade 2 diastolic dysfunction (with EF 65%), after having had a CABG in 2014, with CAD and DLD.  She is being managed by cardiology (Dr. Braxton, but retiring, and needs new cardiologist....).    Previously increased lisinopril and Lasix....  Currently on aspirin 81, Lipitor 80, and      Lasix 10 d, lisinopril 40 d, Lopressor 12.5 BID.  Denies:  angina, palpitations, or dyspnea.     Notes improvement in prior sensation of leg swelling.  Today, with improved BP -160/80, 150/70.  Is on low-dose lopressor, but HR only 63.   Although she has HF, she does have      diastolic dysfunction (grade 2), and      need for better blood pressure management.  - Will start on low-dose amlodipine 2.5.  - follow-up in a few weeks.  ... with labs (as prior potassium at upper limit).   ... if high potassium, or SE for amlodipine,         then might increase lisinopril.

## 2021-07-13 DIAGNOSIS — R49.0 HOARSENESS OF VOICE: ICD-10-CM

## 2021-07-13 RX ORDER — OMEPRAZOLE 20 MG/1
CAPSULE, DELAYED RELEASE ORAL
Qty: 90 CAPSULE | Refills: 0 | Status: SHIPPED | OUTPATIENT
Start: 2021-07-13 | End: 2021-11-19

## 2021-07-13 NOTE — TELEPHONE ENCOUNTER
I received a call from the patient informing me that she had tried to refill Fluticasone-Salmeterol (WIXELA INHUB) 250-50 MCG/DOSE AEROSOL POWDER, BREATH ACTIVATED and was told that it had been refused.  The patient stated that she found this prescription to be very helpful in cutting down on nasal secretions. and would like approval for same.  She just asked her pharmacy to submit another request for same.      Received request via: Pharmacy    Was the patient seen in the last year in this department? Yes    Does the patient have an active prescription (recently filled or refills available) for medication(s) requested? YES

## 2021-07-14 NOTE — TELEPHONE ENCOUNTER
I called and left a message on the patient's  room phone at [phone] room 148 as her mobile phone did not have voicemail set up.  I left a message informing the patient that a 90 days supply of omeprazole had been approved as well as one Wixela inhaler with two refills, both having been sent to her pharmacy.  The patient was told to check with her pharmacy to ascertain when she could pick them up.  I left my phone number if the patient had any questions.

## 2021-07-17 DIAGNOSIS — I10 ESSENTIAL HYPERTENSION: ICD-10-CM

## 2021-07-22 ENCOUNTER — HOSPITAL ENCOUNTER (OUTPATIENT)
Dept: LAB | Facility: MEDICAL CENTER | Age: 81
End: 2021-07-22
Attending: STUDENT IN AN ORGANIZED HEALTH CARE EDUCATION/TRAINING PROGRAM
Payer: MEDICARE

## 2021-07-22 DIAGNOSIS — I10 ESSENTIAL HYPERTENSION: ICD-10-CM

## 2021-07-22 LAB
ANION GAP SERPL CALC-SCNC: 12 MMOL/L (ref 7–16)
BUN SERPL-MCNC: 30 MG/DL (ref 8–22)
CALCIUM SERPL-MCNC: 9.7 MG/DL (ref 8.5–10.5)
CHLORIDE SERPL-SCNC: 103 MMOL/L (ref 96–112)
CO2 SERPL-SCNC: 24 MMOL/L (ref 20–33)
CREAT SERPL-MCNC: 1.09 MG/DL (ref 0.5–1.4)
GLUCOSE SERPL-MCNC: 86 MG/DL (ref 65–99)
POTASSIUM SERPL-SCNC: 5.2 MMOL/L (ref 3.6–5.5)
SODIUM SERPL-SCNC: 139 MMOL/L (ref 135–145)

## 2021-07-22 PROCEDURE — 80048 BASIC METABOLIC PNL TOTAL CA: CPT

## 2021-07-22 PROCEDURE — 36415 COLL VENOUS BLD VENIPUNCTURE: CPT

## 2021-07-28 ENCOUNTER — OFFICE VISIT (OUTPATIENT)
Dept: MEDICAL GROUP | Facility: PHYSICIAN GROUP | Age: 81
End: 2021-07-28
Payer: MEDICARE

## 2021-07-28 VITALS
DIASTOLIC BLOOD PRESSURE: 60 MMHG | HEART RATE: 57 BPM | HEIGHT: 60 IN | WEIGHT: 153 LBS | BODY MASS INDEX: 30.04 KG/M2 | RESPIRATION RATE: 16 BRPM | TEMPERATURE: 98.4 F | SYSTOLIC BLOOD PRESSURE: 138 MMHG | OXYGEN SATURATION: 98 %

## 2021-07-28 DIAGNOSIS — I10 ESSENTIAL HYPERTENSION: ICD-10-CM

## 2021-07-28 DIAGNOSIS — E78.5 DYSLIPIDEMIA: ICD-10-CM

## 2021-07-28 PROBLEM — R73.02 IMPAIRED GLUCOSE TOLERANCE: Status: RESOLVED | Noted: 2017-04-18 | Resolved: 2021-07-28

## 2021-07-28 PROCEDURE — 99213 OFFICE O/P EST LOW 20 MIN: CPT | Performed by: STUDENT IN AN ORGANIZED HEALTH CARE EDUCATION/TRAINING PROGRAM

## 2021-07-28 RX ORDER — ATORVASTATIN CALCIUM 80 MG/1
80 TABLET, FILM COATED ORAL EVERY EVENING
Qty: 90 TABLET | Refills: 2 | Status: SHIPPED | OUTPATIENT
Start: 2021-07-28 | End: 2022-01-26 | Stop reason: SDUPTHER

## 2021-07-28 RX ORDER — AMLODIPINE BESYLATE 2.5 MG/1
2.5 TABLET ORAL DAILY
Qty: 90 TABLET | Refills: 2 | Status: SHIPPED | OUTPATIENT
Start: 2021-07-28 | End: 2021-10-28 | Stop reason: SINTOL

## 2021-07-28 ASSESSMENT — FIBROSIS 4 INDEX: FIB4 SCORE: 1.5

## 2021-07-28 NOTE — PROGRESS NOTES
Established Patient     Valorie Sierra is a 80 y.o. female.    Chief Complaint   Patient presents with   • Hypertension             Problems addressed this visit:     This note uses problem-based documentation.  Subjective HPI is included in Assessment & Plan, at bottom of note.   Problem   Essential Hypertension                            Past Medical History:   Diagnosis Date   • Anemia    • Anxiety    • Arrhythmia    • Arthritis    • Blood transfusion, without reported diagnosis    • CHF (congestive heart failure) ([facility])    • Chronic airway obstruction, not elsewhere classified    • Depression    • Emphysema    • GERD (gastroesophageal reflux disease)    • Headache, classical migraine    • Heart attack ([facility])    • Heart murmur    • Hyperlipidemia    • Hypertension    • IBD (inflammatory bowel disease)    • Kidney disease    • Ulcer    • Unspecified cataract        Patient Active Problem List   Diagnosis   • Diastolic CHF ([facility])   • Dyslipidemia   • COPD (chronic obstructive pulmonary disease) ([facility])   • Essential hypertension   • Hx of CABG   • CAD - Coronary artery disease involving native coronary artery of native heart without angina pectoris   • Osteopenia   • Risk for falls   • Pulmonary hypertension ([facility])   • Mitral insufficiency   • CKD (chronic kidney disease) stage 3, GFR 30-59 ml/min ([facility])   • Anemia due to chronic kidney disease   • Tonsil pain   • Healthcare maintenance   • DM (diabetes mellitus), type 2 ([facility])       Past Surgical History:   Procedure Laterality Date   • MULTIPLE CORONARY ARTERY BYPASS ENDO VEIN HARVEST  9/1/2014    Performed by Stephen Nowak M.D. at SURGERY [facility]   • BLADDER NECK CONTRACTURE EXICISION  1970   • GANGLION EXCISION  1969    left wrist   • ABDOMINAL EXPLORATION     • ABDOMINAL HYSTERECTOMY TOTAL      fibroids   • APPENDECTOMY     • DENTAL EXTRACTION(S)         Family History   Problem Relation Age of Onset   • Stroke Mother    • Hyperlipidemia Mother    •  Hypertension Mother    • Lung Disease Mother         smoker   • Heart Disease Father    • Hypertension Father    • Hyperlipidemia Father    • Lung Disease Father         smoker   • Alcohol/Drug Father         etoh   • Other Father         aneurysm       Social History     Tobacco Use   • Smoking status: Former Smoker     Packs/day: 3.00     Years: 55.00     Pack years: 165.00     Types: Cigarettes     Quit date: 2007     Years since quittin.9   • Smokeless tobacco: Never Used   • Tobacco comment: Quit   (Hx 3ppd x 55yr)...    Substance Use Topics   • Alcohol use: No     Alcohol/week: 0.0 oz       Current medications:  (including new changes):  Current Outpatient Medications   Medication Sig Dispense Refill   • amLODIPine (NORVASC) 2.5 MG Tab Take 1 tablet by mouth every day. 90 tablet 2   • atorvastatin (LIPITOR) 80 MG tablet Take 1 tablet by mouth every evening. N THE EVENING 90 tablet 2   • metoprolol tartrate (LOPRESSOR) 25 MG Tab Take 0.5 Tablets by mouth 2 times a day. 90 tablet 2   • omeprazole (PRILOSEC) 20 MG delayed-release capsule TAKE 1 CAPSULE BY MOUTH EVERY DAY 90 capsule 0   • Fluticasone-Salmeterol (WIXELA INHUB) 250-50 MCG/DOSE AEROSOL POWDER, BREATH ACTIVATED Inhale 1 Inhalation every 12 hours. 1 Each 2   • lisinopril (PRINIVIL) 40 MG tablet Take 1 tablet by mouth every day. 90 tablet 1   • furosemide (LASIX) 20 MG Tab Take 0.5 Tablets by mouth every day. 45 tablet 1   • albuterol 108 (90 Base) MCG/ACT Aero Soln inhalation aerosol Inhale 2 Puffs every four hours as needed (Emergency Rescue use for - Shortness of Breath, or Wheezing.). 1 Each 3   • Probiotic Product (PROBIOTIC DAILY PO) Take  by mouth.     • Calcium Carbonate-Vit D-Min (CALCIUM 1200 PO) Take  by mouth.     • aspirin EC (ECOTRIN) 81 MG TBEC Take 1 Tab by mouth every day. 90 Tab 3     No current facility-administered medications for this visit.       Allergies as of 2021 - Reviewed 2021   Allergen Reaction  Noted   • Iodine Itching 09/08/2014   • Oxycodone-acetaminophen Hives 09/08/2014   • Sulfa drugs Vomiting 09/08/2014   • Food  01/08/2020   • Nsaids  08/18/2020                   Review of Symptoms   (as noted elsewhere, and .....)   GEN/CONST:   Denies fever, chills, fatigue,   CARDIO:   Denies chest pain, palpitations, or edema.    RESP:   Denies shortness of breath, wheezing.      GI:  Denies nausea, vomiting,    PSYCH:  Denies anxiety, depression,       Physical Exam  /60 (BP Location: Left arm, Patient Position: Sitting, BP Cuff Size: Adult)   Pulse (!) 57   Temp 36.9 °C (98.4 °F) (Temporal)   Resp 16   Ht 1.524 m (5')   Wt 69.4 kg (153 lb)   SpO2 98%   BMI 29.88 kg/m²   General:  Alert and oriented, No apparent distress.    Lungs: Clear to auscultation bilaterally.  No wheezes or rales.  Cardiovascular: Regular rate and rhythm.  No murmurs, rubs or gallops.  Extremities:  No pedal edema.  Good general motion of extremities.   Psychological: Appears to have normal mood and affect.        Labs:   Prior labs reviewed.  -  CBC, CMP, Lipids.     Recent labs reviewed - BMP.                             Assessment & Plan  (with subjective history and orders):  Of note, the list below is not in a specific nor sortable order.      Problem List Items Addressed This Visit     Essential hypertension     Chronic and ongoing   Patient has grade 2 diastolic dysfunction (with EF 65%), after having had a CABG in 2014, with CAD and DLD.  She is being managed by cardiology (Dr. Braxton, but retiring, and needs new cardiologist....).    Currently on      - aspirin 81, Lipitor 80, and      - Lasix 10 d, lisinopril 40 d, Lopressor 12.5 BID.  ...- amlodipine 2.5  (...added on prior visit for BP control)  Denies:  angina, palpitations, or dyspnea.        Of note... only low-dose lopressor, as HR usually 57-63.   Although HF, has diastolic dysfunction (grade 2),         Had amlodipine 2.5  added before, for BP control.    -  continue on same meds (as above) for now.   - can monitor periodically, and see cardiology.         Relevant Medications    amLODIPine (NORVASC) 2.5 MG Tab    atorvastatin (LIPITOR) 80 MG tablet    metoprolol tartrate (LOPRESSOR) 25 MG Tab      Other Visit Diagnoses       Dyslipidemia    Relevant Medications    atorvastatin (LIPITOR) 80 MG tablet     Of note, the above list is not in a specific nor sortable order.                  Diagnosis Table, with orders:  1. Essential hypertension  amLODIPine (NORVASC) 2.5 MG Tab    metoprolol tartrate (LOPRESSOR) 25 MG Tab   2. Dyslipidemia  atorvastatin (LIPITOR) 80 MG tablet   3. Edema, unspecified type                - medications listed above were refilled today.                Follow-up:  3 months (DM-2 exam, retina-screen).               A computerized dictation system may have been used in this note.    Despite review, there may be some errors in spelling or grammar.    Thompson Del Toro M.D.  7/28/2021

## 2021-07-28 NOTE — ASSESSMENT & PLAN NOTE
Chronic and ongoing   Patient has grade 2 diastolic dysfunction (with EF 65%), after having had a CABG in 2014, with CAD and DLD.  She is being managed by cardiology (Dr. Braxton, but retiring, and needs new cardiologist....).    Currently on      - aspirin 81, Lipitor 80, and      - Lasix 10 d, lisinopril 40 d, Lopressor 12.5 BID.  ...- amlodipine 2.5  (added on prior visit for BP control)  Denies:  angina, palpitations, or dyspnea.        Of note... only low-dose lopressor, as HR usually 57-63.   Although HF, has diastolic dysfunction (grade 2),         Had amlodipine 2.5 added before, for BP control.    - continue on same meds (as above) for now.   - can monitor periodically, and see cardiology.

## 2021-10-28 ENCOUNTER — OFFICE VISIT (OUTPATIENT)
Dept: MEDICAL GROUP | Facility: PHYSICIAN GROUP | Age: 81
End: 2021-10-28
Payer: MEDICARE

## 2021-10-28 VITALS
TEMPERATURE: 97.4 F | DIASTOLIC BLOOD PRESSURE: 50 MMHG | SYSTOLIC BLOOD PRESSURE: 136 MMHG | HEART RATE: 63 BPM | BODY MASS INDEX: 29.64 KG/M2 | HEIGHT: 60 IN | WEIGHT: 151 LBS | OXYGEN SATURATION: 95 % | RESPIRATION RATE: 18 BRPM

## 2021-10-28 DIAGNOSIS — I10 ESSENTIAL HYPERTENSION: ICD-10-CM

## 2021-10-28 DIAGNOSIS — E11.9 TYPE 2 DIABETES MELLITUS WITHOUT COMPLICATION, WITHOUT LONG-TERM CURRENT USE OF INSULIN (HCC): ICD-10-CM

## 2021-10-28 DIAGNOSIS — R25.2 MUSCLE CRAMPING: ICD-10-CM

## 2021-10-28 PROCEDURE — 99214 OFFICE O/P EST MOD 30 MIN: CPT | Performed by: STUDENT IN AN ORGANIZED HEALTH CARE EDUCATION/TRAINING PROGRAM

## 2021-10-28 ASSESSMENT — FIBROSIS 4 INDEX: FIB4 SCORE: 1.52

## 2021-10-28 NOTE — PATIENT INSTRUCTIONS
- STOP amlodipine.   - INCREASE to metoprolol 25 twice daily.  - Continue other medications as noted on the medication page.     - Please get the new labs in about 1 month.   - Return here about 1 week later.     - Please call your cardiology office,     and get scheduled with a new provider.   Thank you.

## 2021-10-28 NOTE — ASSESSMENT & PLAN NOTE
Patient now notes having had some muscle cramps when walking, in her legs.  It then continues for a little while after walking, and then goes away.  Denies general muscle aches, cramping at rest, or discoloration or localized edema.  -Discussed getting ABIs, but patient declines at this time.…  She states she might consider in the future, but is not interested in testing at this time.  -Can address again in the future, and see if she would be more willing at that time.  -Continue to manage blood pressure, and continue Lipitor 80.  -Get CMP and Mag, for next visit.

## 2021-10-28 NOTE — PROGRESS NOTES
Established Patient     Valorie Sierra is a 81 y.o. female.    Chief Complaint   Patient presents with   • Hypertension             Problems addressed this visit:     This note uses problem-based documentation.  Subjective HPI is included in Assessment & Plan, at bottom of note.   Problem   Muscle Cramping   Essential Hypertension                           Past Medical History:   Diagnosis Date   • Anemia    • Anxiety    • Arrhythmia    • Arthritis    • Blood transfusion, without reported diagnosis    • CHF (congestive heart failure) ([facility])    • Chronic airway obstruction, not elsewhere classified    • Depression    • Emphysema    • GERD (gastroesophageal reflux disease)    • Headache, classical migraine    • Heart attack ([facility])    • Heart murmur    • Hyperlipidemia    • Hypertension    • IBD (inflammatory bowel disease)    • Kidney disease    • Ulcer    • Unspecified cataract        Patient Active Problem List   Diagnosis   • Diastolic CHF ([facility])   • Dyslipidemia   • COPD (chronic obstructive pulmonary disease) ([facility])   • Essential hypertension   • Hx of CABG   • CAD - Coronary artery disease involving native coronary artery of native heart without angina pectoris   • Osteopenia   • Risk for falls   • Pulmonary hypertension ([facility])   • Mitral insufficiency   • CKD (chronic kidney disease) stage 3, GFR 30-59 ml/min ([facility])   • Anemia due to chronic kidney disease   • Tonsil pain   • Healthcare maintenance   • DM (diabetes mellitus), type 2 ([facility])   • Muscle cramping       Past Surgical History:   Procedure Laterality Date   • MULTIPLE CORONARY ARTERY BYPASS ENDO VEIN HARVEST  9/1/2014    Performed by Stephen Nowak M.D. at SURGERY [facility]   • BLADDER NECK CONTRACTURE EXICISION  1970   • GANGLION EXCISION  1969    left wrist   • ABDOMINAL EXPLORATION     • ABDOMINAL HYSTERECTOMY TOTAL      fibroids   • APPENDECTOMY     • DENTAL EXTRACTION(S)         Family History   Problem Relation Age of Onset   • Stroke Mother     • Hyperlipidemia Mother    • Hypertension Mother    • Lung Disease Mother         smoker   • Heart Disease Father    • Hypertension Father    • Hyperlipidemia Father    • Lung Disease Father         smoker   • Alcohol/Drug Father         etoh   • Other Father         aneurysm       Social History     Tobacco Use   • Smoking status: Former Smoker     Packs/day: 3.00     Years: 55.00     Pack years: 165.00     Types: Cigarettes     Quit date: 2007     Years since quittin.1   • Smokeless tobacco: Never Used   • Tobacco comment: Quit   (Hx 3ppd x 55yr)...    Substance Use Topics   • Alcohol use: No     Alcohol/week: 0.0 oz       Current medications:  (including new changes):  Current Outpatient Medications   Medication Sig Dispense Refill   • metoprolol tartrate (LOPRESSOR) 25 MG Tab Take 1 Tablet by mouth 2 times a day. 90 Tablet 0   • WIXELA INHUB 250-50 MCG/DOSE AEROSOL POWDER, BREATH ACTIVATED INHALE 1 INHALATION EVERY 12 HOURS. 60 Each 2   • atorvastatin (LIPITOR) 80 MG tablet Take 1 tablet by mouth every evening. N THE EVENING 90 tablet 2   • omeprazole (PRILOSEC) 20 MG delayed-release capsule TAKE 1 CAPSULE BY MOUTH EVERY DAY 90 capsule 0   • lisinopril (PRINIVIL) 40 MG tablet Take 1 tablet by mouth every day. 90 tablet 1   • furosemide (LASIX) 20 MG Tab Take 0.5 Tablets by mouth every day. 45 tablet 1   • Probiotic Product (PROBIOTIC DAILY PO) Take  by mouth.     • Calcium Carbonate-Vit D-Min (CALCIUM 1200 PO) Take  by mouth.     • aspirin EC (ECOTRIN) 81 MG TBEC Take 1 Tab by mouth every day. 90 Tab 3   • albuterol 108 (90 Base) MCG/ACT Aero Soln inhalation aerosol Inhale 2 Puffs every four hours as needed (Emergency Rescue use for - Shortness of Breath, or Wheezing.). 1 Each 3     No current facility-administered medications for this visit.       Allergies as of 10/28/2021 - Reviewed 10/28/2021   Allergen Reaction Noted   • Iodine Itching 2014   • Oxycodone-acetaminophen Hives 2014    • Sulfa drugs Vomiting 09/08/2014   • Food  01/08/2020   • Nsaids  08/18/2020                   Review of Symptoms   (as noted elsewhere, and .....)   GEN/CONST:   Denies fever, chills,    CARDIO:   Denies chest pain, or edema.    RESP:   Denies shortness of breath, or coughing.  GI:    Denies nausea, vomiting, diarrhea,      PSYCH:  Denies anxiety, depression,           Physical Exam  /50   Pulse 63   Temp 36.3 °C (97.4 °F) (Temporal)   Resp 18   Ht 1.524 m (5')   Wt 68.5 kg (151 lb)   SpO2 95%   BMI 29.49 kg/m²   General:  Alert and oriented, No apparent distress.    Lungs: Clear to auscultation bilaterally.  No wheezes or rales.  Cardiovascular: Regular rate and rhythm.  No murmurs, rubs or gallops.  Abdomen:  Soft.  Non-tender.  No rebound or guarding.   Extremities:  No leg edema.  Good general motion of extremities.       Labs:  Recent / Prior labs reviewed.    BMP, A1c and mag                             Assessment & Plan  (with subjective history and orders):  Of note, the list below is not in a specific order.      Problem List Items Addressed This Visit         Essential hypertension     Chronic and ongoing   Patient has grade 2 diastolic dysfunction (with EF 65%), after having had a CABG in 2014, with CAD and DLD.  She is being managed by cardiology (Dr. Braxton, but retired, and needs new cardiologist....  Needs to call and schedule...).    Currently on      - aspirin 81, Lipitor 80, Lasix 10 d,     - lisinopril 40 d, Lopressor 12.5 BID.  ...- amlodipine 2.5  (added on prior visit for BP control)  ... but increase bloating and leg swelling on amlodipine...  Denies:  angina, palpitations, or dyspnea.    In office, BP - 156/60, then 136/50.  (HR~ 63 - 66)  - STOP amlodipine.   - INCREASE to Lopressor 25 BID.  - Continue other medications as above.   - should call and schedule with cardiology.   - follow-up here in about 4 weeks.          Relevant Medications    metoprolol tartrate (LOPRESSOR)  25 MG Tab    Other Relevant Orders    MAGNESIUM    Comp Metabolic Panel    Muscle cramping     Patient now notes having had some muscle cramps when walking, in her legs.  It then continues for a little while after walking, and then goes away.  Denies general muscle aches, cramping at rest, or discoloration or localized edema.  -Discussed getting ABIs, but patient declines at this time.…  She states she might consider in the future, but is not interested in testing at this time.  -Can address again in the future, and see if she would be more willing at that time.  -Continue to manage blood pressure, and continue Lipitor 80.  -Get CMP and Mag, for next visit.          Relevant Orders    MAGNESIUM    Comp Metabolic Panel         DM (diabetes mellitus), type 2 (HCC)    Relevant Orders    HEMOGLOBIN A1C    Comp Metabolic Panel                 Diagnosis Table, with orders:  1. Essential hypertension  MAGNESIUM    metoprolol tartrate (LOPRESSOR) 25 MG Tab    Comp Metabolic Panel   2. Muscle cramping  MAGNESIUM    Comp Metabolic Panel   3. Type 2 diabetes mellitus without complication, without long-term current use of insulin (HCC)  HEMOGLOBIN A1C    Comp Metabolic Panel                 Follow-up:  5 weeks  (HTN, cramps, and Labs) (DM-2 if time).              A computerized dictation system may have been used in this note.    Despite review, there may be some errors in spelling or grammar.    Thompson Del Toro M.D.  10/28/2021

## 2021-10-28 NOTE — ASSESSMENT & PLAN NOTE
Chronic and ongoing   Patient has grade 2 diastolic dysfunction (with EF 65%), after having had a CABG in 2014, with CAD and DLD.  She is being managed by cardiology (Dr. Braxton, but retired, and needs new cardiologist....  Needs to call and schedule...).    Currently on      - aspirin 81, Lipitor 80, Lasix 10 d,     - lisinopril 40 d, Lopressor 12.5 BID.  ...- amlodipine 2.5  (added on prior visit for BP control)  ... but increase bloating and leg swelling on amlodipine...  Denies:  angina, palpitations, or dyspnea.    In office, BP - 156/60, then 136/50.  (HR~ 63 - 66)  - STOP amlodipine.   - INCREASE to Lopressor 25 BID.  - Continue other medications as above.   - should call and schedule with cardiology.   - follow-up here in about 4 weeks.

## 2021-11-12 ENCOUNTER — OFFICE VISIT (OUTPATIENT)
Dept: CARDIOLOGY | Facility: MEDICAL CENTER | Age: 81
End: 2021-11-12
Payer: MEDICARE

## 2021-11-12 VITALS
DIASTOLIC BLOOD PRESSURE: 76 MMHG | BODY MASS INDEX: 30.63 KG/M2 | HEIGHT: 60 IN | HEART RATE: 64 BPM | OXYGEN SATURATION: 96 % | RESPIRATION RATE: 14 BRPM | WEIGHT: 156 LBS | SYSTOLIC BLOOD PRESSURE: 140 MMHG

## 2021-11-12 DIAGNOSIS — I10 ESSENTIAL HYPERTENSION: ICD-10-CM

## 2021-11-12 DIAGNOSIS — N18.31 STAGE 3A CHRONIC KIDNEY DISEASE: ICD-10-CM

## 2021-11-12 DIAGNOSIS — I27.20 PULMONARY HYPERTENSION (HCC): ICD-10-CM

## 2021-11-12 DIAGNOSIS — I50.32 CHRONIC DIASTOLIC CONGESTIVE HEART FAILURE (HCC): ICD-10-CM

## 2021-11-12 DIAGNOSIS — E78.5 DYSLIPIDEMIA: ICD-10-CM

## 2021-11-12 DIAGNOSIS — Z95.1 HX OF CABG: ICD-10-CM

## 2021-11-12 DIAGNOSIS — R25.2 MUSCLE CRAMPING: ICD-10-CM

## 2021-11-12 DIAGNOSIS — I25.10 CORONARY ARTERY DISEASE INVOLVING NATIVE CORONARY ARTERY OF NATIVE HEART WITHOUT ANGINA PECTORIS: ICD-10-CM

## 2021-11-12 DIAGNOSIS — E11.9 TYPE 2 DIABETES MELLITUS WITHOUT COMPLICATION, WITHOUT LONG-TERM CURRENT USE OF INSULIN (HCC): ICD-10-CM

## 2021-11-12 PROBLEM — I34.0 MITRAL INSUFFICIENCY: Status: RESOLVED | Noted: 2018-07-31 | Resolved: 2021-11-12

## 2021-11-12 PROCEDURE — 99214 OFFICE O/P EST MOD 30 MIN: CPT | Performed by: NURSE PRACTITIONER

## 2021-11-12 ASSESSMENT — ENCOUNTER SYMPTOMS
FEVER: 0
COUGH: 0
PND: 0
MYALGIAS: 0
NERVOUS/ANXIOUS: 1
PALPITATIONS: 0
CLAUDICATION: 0
SHORTNESS OF BREATH: 0
HEADACHES: 1
ABDOMINAL PAIN: 0
DIZZINESS: 0
ORTHOPNEA: 0

## 2021-11-12 ASSESSMENT — FIBROSIS 4 INDEX: FIB4 SCORE: 1.52

## 2021-11-13 NOTE — PROGRESS NOTES
Chief Complaint   Patient presents with   • Coronary Artery Bypass Graft (CABG)     F/V Dx: Hx of CABG   • CHF (Diastolic)   • Coronary Artery Disease     F/V Dx: CAD - Coronary artery disease involving native coronary artery of native heart without angina pectoris       Subjective     Valorie Sierra is a 81 y.o. female who presents today for 6 month follow up.    She is a prior patient of Dr. Stanley in our office. Hx of HTN, HLD, CAD with prior CABG in '14 by She, pulmonary HTN with obesity, COPD with former smoking hx and pulmonary HTN, DM type II, CKD stage 3a, and anemia.    She presents today with her friend who helps drive her to appointments.    She lives alone. Her son passed away in February.    She is scared to take her BP at home. She has been seeing her PCP for BP management, she sees him in a few weeks for BP check.    She has a headache in the back of her head since her shingles vaccine.    She has mild fatigue and exertional shortness of breath. She doesn't exercise.    She has leg cramps in both legs, worse at night.    She has chronic mild LE edema since her CABG.    She has no chest pain, dizziness/lightheadedness, or palpitations.    Past Medical History:   Diagnosis Date   • Anemia    • Anxiety    • Arrhythmia    • Arthritis    • Blood transfusion, without reported diagnosis    • CHF (congestive heart failure) (HCC)    • Chronic airway obstruction, not elsewhere classified    • Depression    • Emphysema    • GERD (gastroesophageal reflux disease)    • Headache, classical migraine    • Heart attack (HCC)    • Heart murmur    • Hyperlipidemia    • Hypertension    • IBD (inflammatory bowel disease)    • Kidney disease    • Ulcer    • Unspecified cataract      Past Surgical History:   Procedure Laterality Date   • MULTIPLE CORONARY ARTERY BYPASS ENDO VEIN HARVEST  9/1/2014    Performed by Stephen Nowak M.D. at SURGERY [facility]   • BLADDER NECK CONTRACTURE EXICISION  1970   • GANGLION  EXCISION  1969    left wrist   • ABDOMINAL EXPLORATION     • ABDOMINAL HYSTERECTOMY TOTAL      fibroids   • APPENDECTOMY     • DENTAL EXTRACTION(S)       Family History   Problem Relation Age of Onset   • Stroke Mother    • Hyperlipidemia Mother    • Hypertension Mother    • Lung Disease Mother         smoker   • Heart Disease Father    • Hypertension Father    • Hyperlipidemia Father    • Lung Disease Father         smoker   • Alcohol/Drug Father         etoh   • Other Father         aneurysm     Social History     Socioeconomic History   • Marital status:      Spouse name: Not on file   • Number of children: Not on file   • Years of education: Not on file   • Highest education level: Not on file   Occupational History   • Not on file   Tobacco Use   • Smoking status: Former Smoker     Packs/day: 3.00     Years: 55.00     Pack years: 165.00     Types: Cigarettes     Quit date: 2007     Years since quittin.2   • Smokeless tobacco: Never Used   • Tobacco comment: Quit   (Hx 3ppd x 55yr)...    Vaping Use   • Vaping Use: Never used   Substance and Sexual Activity   • Alcohol use: No     Alcohol/week: 0.0 oz   • Drug use: No   • Sexual activity: Never     Comment: lives in a motel (prev.[email or web address]  ), dtr  in , ,    Other Topics Concern   • Not on file   Social History Narrative   • Not on file     Social Determinants of Health     Financial Resource Strain:    • Difficulty of Paying Living Expenses: Not on file   Food Insecurity:    • Worried About Running Out of Food in the Last Year: Not on file   • Ran Out of Food in the Last Year: Not on file   Transportation Needs:    • Lack of Transportation (Medical): Not on file   • Lack of Transportation (Non-Medical): Not on file   Physical Activity:    • Days of Exercise per Week: Not on file   • Minutes of Exercise per Session: Not on file   Stress:    • Feeling of Stress : Not on file   Social Connections:    • Frequency of  Communication with Friends and Family: Not on file   • Frequency of Social Gatherings with Friends and Family: Not on file   • Attends [employer] Services: Not on file   • Active Member of Clubs or Organizations: Not on file   • Attends Club or Organization Meetings: Not on file   • Marital Status: Not on file   Intimate Partner Violence:    • Fear of Current or Ex-Partner: Not on file   • Emotionally Abused: Not on file   • Physically Abused: Not on file   • Sexually Abused: Not on file   Housing Stability:    • Unable to Pay for Housing in the Last Year: Not on file   • Number of Places Lived in the Last Year: Not on file   • Unstable Housing in the Last Year: Not on file     Allergies   Allergen Reactions   • Iodine Itching   • Oxycodone-Acetaminophen Hives   • Sulfa Drugs Vomiting   • Food      avocado   • Nsaids      Pt states she was told to never take these because of her kidneys.     Outpatient Encounter Medications as of 11/12/2021   Medication Sig Dispense Refill   • metoprolol tartrate (LOPRESSOR) 25 MG Tab Take 1 Tablet by mouth 2 times a day. (Patient taking differently: Take 40 mg by mouth 2 times a day.) 90 Tablet 0   • WIXELA INHUB 250-50 MCG/DOSE AEROSOL POWDER, BREATH ACTIVATED INHALE 1 INHALATION EVERY 12 HOURS. 60 Each 2   • atorvastatin (LIPITOR) 80 MG tablet Take 1 tablet by mouth every evening. N THE EVENING 90 tablet 2   • omeprazole (PRILOSEC) 20 MG delayed-release capsule TAKE 1 CAPSULE BY MOUTH EVERY DAY 90 capsule 0   • lisinopril (PRINIVIL) 40 MG tablet Take 1 tablet by mouth every day. 90 tablet 1   • furosemide (LASIX) 20 MG Tab Take 0.5 Tablets by mouth every day. 45 tablet 1   • albuterol 108 (90 Base) MCG/ACT Aero Soln inhalation aerosol Inhale 2 Puffs every four hours as needed (Emergency Rescue use for - Shortness of Breath, or Wheezing.). 1 Each 3   • Probiotic Product (PROBIOTIC DAILY PO) Take  by mouth.     • Calcium Carbonate-Vit D-Min (CALCIUM 1200 PO) Take  by mouth.     •  "aspirin EC (ECOTRIN) 81 MG TBEC Take 1 Tab by mouth every day. 90 Tab 3     No facility-administered encounter medications on file as of 11/12/2021.     Review of Systems   Constitutional: Negative for fever and malaise/fatigue.   Respiratory: Negative for cough and shortness of breath.    Cardiovascular: Positive for leg swelling. Negative for chest pain, palpitations, orthopnea, claudication and PND.        Leg swelling since CABG surgery   Gastrointestinal: Negative for abdominal pain.   Musculoskeletal: Negative for myalgias.        Leg cramps at night   Neurological: Positive for headaches. Negative for dizziness.        Headache in the back of her head since shingles vaccine    Neuropathy in left lower leg   Psychiatric/Behavioral: The patient is nervous/anxious.         \"Doesn't like to take BP readings at home because she is scared\"              Objective     /76 (BP Location: Left arm, Patient Position: Sitting, BP Cuff Size: Adult)   Pulse 64   Resp 14   Ht 1.524 m (5')   Wt 70.8 kg (156 lb)   SpO2 96%   BMI 30.47 kg/m²     Physical Exam  Vitals and nursing note reviewed.   Constitutional:       Appearance: Normal appearance. She is well-developed. She is obese.   HENT:      Head: Normocephalic and atraumatic.   Neck:      Vascular: No JVD.   Cardiovascular:      Rate and Rhythm: Normal rate and regular rhythm.      Pulses: Normal pulses.      Heart sounds: Normal heart sounds.   Pulmonary:      Effort: Pulmonary effort is normal.      Breath sounds: Normal breath sounds.   Musculoskeletal:         General: Normal range of motion.      Right lower leg: Edema present.      Left lower leg: Edema present.   Skin:     General: Skin is warm and dry.      Capillary Refill: Capillary refill takes less than 2 seconds.   Neurological:      General: No focal deficit present.      Mental Status: She is alert and oriented to person, place, and time. Mental status is at baseline.   Psychiatric:         Mood " and Affect: Mood normal.         Behavior: Behavior normal.         Thought Content: Thought content normal.         Judgment: Judgment normal.                Assessment & Plan     1. Dyslipidemia     2. Hx of CABG     3. Essential hypertension     4. Chronic diastolic congestive heart failure (HCC)     5. Type 2 diabetes mellitus without complication, without long-term current use of insulin (HCC)     6. CAD - Coronary artery disease involving native coronary artery of native heart without angina pectoris     7. Stage 3a chronic kidney disease (HCC)     8. Muscle cramping     9. Pulmonary hypertension (HCC)         Medical Decision Making: Today's Assessment/Status/Plan:        1. CAD with prior CABG in '14  -no angina, mild THOMAS with sedentary lifestyle and COPD  -follow symptoms  -cont asa, statin  -no recent stress imaging, last echo was in '18    2. HTN  -moderate control  -patient not agreeable to watch BP at home  -PCP just made recent adjustments, cont metoprolol 25 mg BID, lisinopril 40 mg QD, and lasix 10 mg QD  -consider increase in lasix to 20 mg if CKD allows  -follow with PCP, will be happy to help adjust medications if PCP wants our advice    3. DM type II with CKD  -follow labs with PCP    4. Muscle cramping and LE edema  -sounds more electrolyte imbalanced v. Arterial disease  -good pulses bilaterally  -recommend drink tonic water at night and report back to PCP if worsen  -leg edema most likely related to post CABG vein grafting venous insufficiency, encourage hydration and decrease Na intake in diet  -follow symptoms     5. HLD  -statin  -LDL goal <70 with CAD  -labs at goal, check annually    Patient is to follow up with Miranda JASON in 1 year with annual labs.

## 2021-11-19 DIAGNOSIS — R49.0 HOARSENESS OF VOICE: ICD-10-CM

## 2021-11-19 RX ORDER — OMEPRAZOLE 20 MG/1
CAPSULE, DELAYED RELEASE ORAL
Qty: 90 CAPSULE | Refills: 1 | Status: SHIPPED | OUTPATIENT
Start: 2021-11-19 | End: 2023-01-06 | Stop reason: SDUPTHER

## 2021-12-01 ENCOUNTER — HOSPITAL ENCOUNTER (OUTPATIENT)
Dept: LAB | Facility: MEDICAL CENTER | Age: 81
End: 2021-12-01
Attending: STUDENT IN AN ORGANIZED HEALTH CARE EDUCATION/TRAINING PROGRAM
Payer: MEDICARE

## 2021-12-01 DIAGNOSIS — E11.9 TYPE 2 DIABETES MELLITUS WITHOUT COMPLICATION, WITHOUT LONG-TERM CURRENT USE OF INSULIN (HCC): ICD-10-CM

## 2021-12-01 DIAGNOSIS — I10 ESSENTIAL HYPERTENSION: ICD-10-CM

## 2021-12-01 DIAGNOSIS — R25.2 MUSCLE CRAMPING: ICD-10-CM

## 2021-12-01 LAB
ALBUMIN SERPL BCP-MCNC: 4.8 G/DL (ref 3.2–4.9)
ALBUMIN/GLOB SERPL: 3.4 G/DL
ALP SERPL-CCNC: 63 U/L (ref 30–99)
ALT SERPL-CCNC: 20 U/L (ref 2–50)
ANION GAP SERPL CALC-SCNC: 8 MMOL/L (ref 7–16)
AST SERPL-CCNC: 21 U/L (ref 12–45)
BILIRUB SERPL-MCNC: 0.5 MG/DL (ref 0.1–1.5)
BUN SERPL-MCNC: 27 MG/DL (ref 8–22)
CALCIUM SERPL-MCNC: 9.6 MG/DL (ref 8.5–10.5)
CHLORIDE SERPL-SCNC: 104 MMOL/L (ref 96–112)
CO2 SERPL-SCNC: 28 MMOL/L (ref 20–33)
CREAT SERPL-MCNC: 1.17 MG/DL (ref 0.5–1.4)
EST. AVERAGE GLUCOSE BLD GHB EST-MCNC: 128 MG/DL
FASTING STATUS PATIENT QL REPORTED: NORMAL
GLOBULIN SER CALC-MCNC: 1.4 G/DL (ref 1.9–3.5)
GLUCOSE SERPL-MCNC: 103 MG/DL (ref 65–99)
HBA1C MFR BLD: 6.1 % (ref 4–5.6)
MAGNESIUM SERPL-MCNC: 2 MG/DL (ref 1.5–2.5)
POTASSIUM SERPL-SCNC: 4.9 MMOL/L (ref 3.6–5.5)
PROT SERPL-MCNC: 6.2 G/DL (ref 6–8.2)
SODIUM SERPL-SCNC: 140 MMOL/L (ref 135–145)

## 2021-12-01 PROCEDURE — 36415 COLL VENOUS BLD VENIPUNCTURE: CPT

## 2021-12-01 PROCEDURE — 83036 HEMOGLOBIN GLYCOSYLATED A1C: CPT | Mod: GA

## 2021-12-01 PROCEDURE — 80053 COMPREHEN METABOLIC PANEL: CPT

## 2021-12-01 PROCEDURE — 83735 ASSAY OF MAGNESIUM: CPT

## 2021-12-08 ENCOUNTER — TELEPHONE (OUTPATIENT)
Dept: MEDICAL GROUP | Facility: PHYSICIAN GROUP | Age: 81
End: 2021-12-08

## 2021-12-08 ENCOUNTER — OFFICE VISIT (OUTPATIENT)
Dept: MEDICAL GROUP | Facility: PHYSICIAN GROUP | Age: 81
End: 2021-12-08
Payer: MEDICARE

## 2021-12-08 VITALS
TEMPERATURE: 98.4 F | DIASTOLIC BLOOD PRESSURE: 60 MMHG | HEIGHT: 60 IN | SYSTOLIC BLOOD PRESSURE: 138 MMHG | WEIGHT: 155 LBS | OXYGEN SATURATION: 96 % | HEART RATE: 58 BPM | BODY MASS INDEX: 30.43 KG/M2

## 2021-12-08 DIAGNOSIS — I10 ESSENTIAL HYPERTENSION: ICD-10-CM

## 2021-12-08 DIAGNOSIS — R73.03 PREDIABETES: ICD-10-CM

## 2021-12-08 DIAGNOSIS — R94.4 DECREASED GFR: ICD-10-CM

## 2021-12-08 DIAGNOSIS — R25.2 MUSCLE CRAMPING: ICD-10-CM

## 2021-12-08 PROCEDURE — 99214 OFFICE O/P EST MOD 30 MIN: CPT | Performed by: STUDENT IN AN ORGANIZED HEALTH CARE EDUCATION/TRAINING PROGRAM

## 2021-12-08 ASSESSMENT — FIBROSIS 4 INDEX: FIB4 SCORE: 1.64

## 2021-12-08 NOTE — ASSESSMENT & PLAN NOTE
Occasional muscle cramps; more with walking.  However, declined ABIs before.  Notes improvement, since stopping amlodipine.  Cardiology also recommended      trying electrolytes or tonic water at night.  She feels this is also helping.  - can continue with this for now (as improved).  - Can monitor periodically.

## 2021-12-08 NOTE — ASSESSMENT & PLAN NOTE
Chronic and ongoing elevation of blood sugars.  Deviously prediabetes elevated to diabetic range.  Prior A1c was 6.5; however now A1c-6.1.  … Back in prediabetic range.  Denies:  polyphagia, polydipsia, polyuria.    - continue with diet and exercise, for now.   - no medications for glucose control.   - recheck labs periodically.

## 2021-12-08 NOTE — PATIENT INSTRUCTIONS
Reduce the furosemide (Lasix) to Half a tablet, 3 times per week.    Continue the other medications.    Please get the labs in about 1 month.  Please get the labs done at least a week prior to your next visit.    Please get them done while fasting   (no food, coffee, or juices, for 8 hours - but water is ok).     Thank you.

## 2021-12-08 NOTE — ASSESSMENT & PLAN NOTE
Chronic and ongoing HTN.  In setting of grade 2 diastolic dysfunction (EF ~65%).  And with prior CABG (2014), and CAD and DLE.  Also being managed by cardiology      (now establishing with new cardiologist).  Currently on:     - Lopressor 25, BID.     - Lisinopril 40, daily.     - Lasix 10, daily.       (And also on ASA 81, and Lipitor 80).  ... previously stopped amlodipine for leg swelling.   Denies:  angina, palpitations, or dyspnea   Repeatedly alternates BP,   ... between borderline normal, and severely elevated.  ... Now appears to be more anxiety related  Recent labs with borderline low GFR, while on Lasix.  -Reduce Lasix to 10 mg, 3 x/week  (as prior schedule).  -Continue other medications.  -Recheck in 1 month (with labs).  … Anticipate possible reduction of lisinopril if doing well.  - follow-up with cardiology (but not scheduled for 6 mo)

## 2021-12-08 NOTE — PROGRESS NOTES
Established Patient     Valorie Sierra is a 81 y.o. female.    Chief Complaint   Patient presents with   • Hypertension     follow up   • Lab Results     follow up             Problems addressed this visit:     This note uses problem-based documentation.  Subjective HPI is included in Assessment & Plan, at bottom of note.   Problem   Muscle Cramping   Prediabetes   Essential Hypertension                           Past Medical History:   Diagnosis Date   • Anemia    • Anxiety    • Arrhythmia    • Arthritis    • Blood transfusion, without reported diagnosis    • CHF (congestive heart failure) ([facility])    • Chronic airway obstruction, not elsewhere classified    • Depression    • Emphysema    • GERD (gastroesophageal reflux disease)    • Headache, classical migraine    • Heart attack ([facility])    • Heart murmur    • Hyperlipidemia    • Hypertension    • IBD (inflammatory bowel disease)    • Kidney disease    • Ulcer    • Unspecified cataract        Patient Active Problem List   Diagnosis   • Diastolic CHF ([facility])   • Dyslipidemia   • COPD (chronic obstructive pulmonary disease) ([facility])   • Essential hypertension   • Hx of CABG   • CAD - Coronary artery disease involving native coronary artery of native heart without angina pectoris   • Osteopenia   • Risk for falls   • Pulmonary hypertension ([facility])   • CKD (chronic kidney disease) stage 3, GFR 30-59 ml/min ([facility])   • Anemia due to chronic kidney disease   • Tonsil pain   • Healthcare maintenance   • Prediabetes   • Muscle cramping       Past Surgical History:   Procedure Laterality Date   • MULTIPLE CORONARY ARTERY BYPASS ENDO VEIN HARVEST  9/1/2014    Performed by Stephen Nowak M.D. at SURGERY [facility]   • BLADDER NECK CONTRACTURE EXICISION  1970   • GANGLION EXCISION  1969    left wrist   • ABDOMINAL EXPLORATION     • ABDOMINAL HYSTERECTOMY TOTAL      fibroids   • APPENDECTOMY     • DENTAL EXTRACTION(S)         Family History   Problem Relation Age of Onset   • Stroke  Mother    • Hyperlipidemia Mother    • Hypertension Mother    • Lung Disease Mother         smoker   • Heart Disease Father    • Hypertension Father    • Hyperlipidemia Father    • Lung Disease Father         smoker   • Alcohol/Drug Father         etoh   • Other Father         aneurysm       Social History     Tobacco Use   • Smoking status: Former Smoker     Packs/day: 3.00     Years: 55.00     Pack years: 165.00     Types: Cigarettes     Quit date: 2007     Years since quittin.2   • Smokeless tobacco: Never Used   • Tobacco comment: Quit   (Hx 3ppd x 55yr)...    Substance Use Topics   • Alcohol use: No     Alcohol/week: 0.0 oz       Current medications:  (including new changes):  Current Outpatient Medications   Medication Sig Dispense Refill   • lisinopril (PRINIVIL) 40 MG tablet TAKE 1 TABLET BY MOUTH EVERY DAY 90 Tablet 1   • omeprazole (PRILOSEC) 20 MG delayed-release capsule TAKE 1 CAPSULE BY MOUTH EVERY DAY 90 Capsule 1   • metoprolol tartrate (LOPRESSOR) 25 MG Tab Take 1 Tablet by mouth 2 times a day. (Patient taking differently: Take 40 mg by mouth 2 times a day.) 90 Tablet 0   • WIXELA INHUB 250-50 MCG/DOSE AEROSOL POWDER, BREATH ACTIVATED INHALE 1 INHALATION EVERY 12 HOURS. 60 Each 2   • atorvastatin (LIPITOR) 80 MG tablet Take 1 tablet by mouth every evening. N THE EVENING 90 tablet 2   • furosemide (LASIX) 20 MG Tab Take 0.5 Tablets by mouth every day. (Patient taking differently: Take 20 mg by mouth every day. 1/2 tab for Three times a week) 45 tablet 1   • albuterol 108 (90 Base) MCG/ACT Aero Soln inhalation aerosol Inhale 2 Puffs every four hours as needed (Emergency Rescue use for - Shortness of Breath, or Wheezing.). 1 Each 3   • Probiotic Product (PROBIOTIC DAILY PO) Take  by mouth.     • Calcium Carbonate-Vit D-Min (CALCIUM 1200 PO) Take  by mouth.     • aspirin EC (ECOTRIN) 81 MG TBEC Take 1 Tab by mouth every day. 90 Tab 3     No current facility-administered medications for this  visit.       Allergies as of 12/08/2021 - Reviewed 12/08/2021   Allergen Reaction Noted   • Iodine Itching 09/08/2014   • Oxycodone-acetaminophen Hives 09/08/2014   • Sulfa drugs Vomiting 09/08/2014   • Food  01/08/2020   • Nsaids  08/18/2020                   Review of Symptoms   (as noted elsewhere, and .....)   GEN/CONST:   Denies fever, chills,    CARDIO:   Denies chest pain, or edema.    RESP:   Denies shortness of breath, or coughing.  GI:    Denies nausea, vomiting, diarrhea,      PSYCH:  Denies anxiety, depression,           Physical Exam  /60 (BP Location: Right arm, Patient Position: Sitting, BP Cuff Size: Adult)   Pulse (!) 58   Temp 36.9 °C (98.4 °F) (Temporal)   Ht 1.524 m (5')   Wt 70.3 kg (155 lb)   SpO2 96%   BMI 30.27 kg/m²    ... /60 at start, then increases on rechecking....  ... (similar pattern that now appears more from anxiety)...  General:  Alert and oriented, No apparent distress.  Neck: Trachea midline, no masses, no obvious thyromegaly.  Lungs: Easy / unlabored breathing, without difficulty.  Good oxygenation.   MSK:  Good general motion of extremities. Normal ambulation.    Psych:  Appears to have normal mood and affect.        Labs:  Recent / Last-Prior labs reviewed.    CMP and A1c                             Assessment & Plan  (with subjective history and orders):  Of note, the list below is not in a specific nor sortable order.      Problem List Items Addressed This Visit     Essential hypertension     Chronic and ongoing HTN.  In setting of grade 2 diastolic dysfunction (EF ~65%).  And with prior CABG (2014), and CAD and DLE.  Also being managed by cardiology      (now establishing with new cardiologist).  Currently on:     - Lopressor 25, BID.     - Lisinopril 40, daily.     - Lasix 10, daily.       (And also on ASA 81, and Lipitor 80).  ... previously stopped amlodipine for leg swelling.   Denies:  angina, palpitations, or dyspnea   Repeatedly alternates BP,   ...  between borderline normal, and severely elevated.  ... Now appears to be more anxiety related  Recent labs with borderline low GFR, while on Lasix.  -Reduce Lasix to 10 mg, 3 x/week  (as prior schedule).  -Continue other medications.  -Recheck in 1 month (with labs).  … Anticipate possible reduction of lisinopril if doing well.  - follow-up with cardiology (but not scheduled for 6 mo)         Relevant Orders    Basic Metabolic Panel    Muscle cramping     Occasional muscle cramps; more with walking.  However, declined ABIs before.  Notes improvement, since stopping amlodipine.  Cardiology also recommended      trying electrolytes or tonic water at night.  She feels this is also helping.  - can continue with this for now (as improved).  - Can monitor periodically.          Prediabetes     Chronic and ongoing elevation of blood sugars.  Deviously prediabetes elevated to diabetic range.  Prior A1c was 6.5; however now A1c-6.1.  … Back in prediabetic range.  Denies:  polyphagia, polydipsia, polyuria.    - continue with diet and exercise, for now.   - no medications for glucose control.   - recheck labs periodically.            Other Visit Diagnoses     Decreased GFR        Relevant Orders    Basic Metabolic Panel        Of note, the above list is not in a specific nor sortable order.                  Diagnosis Table, with orders:  1. Essential hypertension  Basic Metabolic Panel   2. Decreased GFR  Basic Metabolic Panel   3. Prediabetes     4. Muscle cramping                   Follow-up:  5 weeks  (HTN)...              A computerized dictation system may have been used in this note.    Despite review, there may be some errors in spelling or grammar.    Thompson Del Toro M.D.  12/8/2021

## 2021-12-23 DIAGNOSIS — I10 ESSENTIAL HYPERTENSION: ICD-10-CM

## 2021-12-23 NOTE — TELEPHONE ENCOUNTER
Requested Prescriptions     Pending Prescriptions Disp Refills   • metoprolol tartrate (LOPRESSOR) 25 MG Tab [Pharmacy Med Name: METOPROLOL TARTRATE 25 MG TAB] 60 Tablet 0     Sig: TAKE 1 TABLET BY MOUTH TWICE A DAY       LUCIE Dickson.

## 2022-01-14 ENCOUNTER — HOSPITAL ENCOUNTER (OUTPATIENT)
Dept: LAB | Facility: MEDICAL CENTER | Age: 82
End: 2022-01-14
Attending: STUDENT IN AN ORGANIZED HEALTH CARE EDUCATION/TRAINING PROGRAM
Payer: MEDICARE

## 2022-01-14 DIAGNOSIS — R94.4 DECREASED GFR: ICD-10-CM

## 2022-01-14 DIAGNOSIS — I10 ESSENTIAL HYPERTENSION: ICD-10-CM

## 2022-01-14 LAB
ANION GAP SERPL CALC-SCNC: 12 MMOL/L (ref 7–16)
BUN SERPL-MCNC: 32 MG/DL (ref 8–22)
CALCIUM SERPL-MCNC: 9.7 MG/DL (ref 8.5–10.5)
CHLORIDE SERPL-SCNC: 104 MMOL/L (ref 96–112)
CO2 SERPL-SCNC: 23 MMOL/L (ref 20–33)
CREAT SERPL-MCNC: 1.3 MG/DL (ref 0.5–1.4)
GLUCOSE SERPL-MCNC: 109 MG/DL (ref 65–99)
POTASSIUM SERPL-SCNC: 4.5 MMOL/L (ref 3.6–5.5)
SODIUM SERPL-SCNC: 139 MMOL/L (ref 135–145)

## 2022-01-14 PROCEDURE — 80048 BASIC METABOLIC PNL TOTAL CA: CPT

## 2022-01-14 PROCEDURE — 36415 COLL VENOUS BLD VENIPUNCTURE: CPT

## 2022-01-26 ENCOUNTER — OFFICE VISIT (OUTPATIENT)
Dept: MEDICAL GROUP | Facility: PHYSICIAN GROUP | Age: 82
End: 2022-01-26
Payer: MEDICARE

## 2022-01-26 VITALS
DIASTOLIC BLOOD PRESSURE: 70 MMHG | HEIGHT: 60 IN | WEIGHT: 153 LBS | OXYGEN SATURATION: 95 % | SYSTOLIC BLOOD PRESSURE: 130 MMHG | TEMPERATURE: 97 F | HEART RATE: 60 BPM | BODY MASS INDEX: 30.04 KG/M2 | RESPIRATION RATE: 14 BRPM

## 2022-01-26 DIAGNOSIS — E78.5 DYSLIPIDEMIA: ICD-10-CM

## 2022-01-26 DIAGNOSIS — Z95.1 HX OF CABG: ICD-10-CM

## 2022-01-26 DIAGNOSIS — N18.32 STAGE 3B CHRONIC KIDNEY DISEASE: ICD-10-CM

## 2022-01-26 DIAGNOSIS — I10 ESSENTIAL HYPERTENSION: ICD-10-CM

## 2022-01-26 DIAGNOSIS — I50.32 CHRONIC DIASTOLIC CONGESTIVE HEART FAILURE (HCC): ICD-10-CM

## 2022-01-26 PROCEDURE — 99214 OFFICE O/P EST MOD 30 MIN: CPT | Performed by: STUDENT IN AN ORGANIZED HEALTH CARE EDUCATION/TRAINING PROGRAM

## 2022-01-26 RX ORDER — ATORVASTATIN CALCIUM 80 MG/1
80 TABLET, FILM COATED ORAL EVERY EVENING
Qty: 90 TABLET | Refills: 4 | Status: SHIPPED | OUTPATIENT
Start: 2022-01-26 | End: 2022-06-14 | Stop reason: SDUPTHER

## 2022-01-26 ASSESSMENT — PATIENT HEALTH QUESTIONNAIRE - PHQ9
5. POOR APPETITE OR OVEREATING: 0 - NOT AT ALL
SUM OF ALL RESPONSES TO PHQ QUESTIONS 1-9: 2
CLINICAL INTERPRETATION OF PHQ2 SCORE: 1

## 2022-01-26 ASSESSMENT — FIBROSIS 4 INDEX: FIB4 SCORE: 1.64

## 2022-01-26 NOTE — ASSESSMENT & PLAN NOTE
Chronic and progressing CKD-3, now CKD-3b....  Last GFR ~39, decreased from before.   With elevated Creatinine.   Previously tried reducing Lasix 10, to 3 x/week.  Still had worse renal function.   In setting of prior CABG and diastolic HF  ... unclear if from medication or heart function.      No obvious pedal edema currently.     Mlid/questionable JVD.  - follow-up with Cardiology.  - continue on Lasix for now.  - Avoids NSAIDs and other nephrotoxic.  - Refer to Nephrology.

## 2022-01-26 NOTE — PROGRESS NOTES
Established Patient     Valorie Sierra is a 81 y.o. female.    Chief Complaint   Patient presents with   • Hypertension Follow-up             Problems addressed this visit:     This note uses problem-based documentation.  Subjective HPI is included in Assessment & Plan, at bottom of note.   Problem   Ckd (Chronic Kidney Disease) Stage 3, Gfr 30-59 Ml/Min ([facility])   Essential Hypertension                           Past Medical History:   Diagnosis Date   • Anemia    • Anxiety    • Arrhythmia    • Arthritis    • Blood transfusion, without reported diagnosis    • CHF (congestive heart failure) ([facility])    • Chronic airway obstruction, not elsewhere classified    • Depression    • Emphysema    • GERD (gastroesophageal reflux disease)    • Headache, classical migraine    • Heart attack ([facility])    • Heart murmur    • Hyperlipidemia    • Hypertension    • IBD (inflammatory bowel disease)    • Kidney disease    • Ulcer    • Unspecified cataract        Patient Active Problem List   Diagnosis   • Diastolic CHF ([facility])   • Dyslipidemia   • COPD (chronic obstructive pulmonary disease) ([facility])   • Essential hypertension   • Hx of CABG   • CAD - Coronary artery disease involving native coronary artery of native heart without angina pectoris   • Osteopenia   • Risk for falls   • Pulmonary hypertension ([facility])   • CKD (chronic kidney disease) stage 3, GFR 30-59 ml/min ([facility])   • Anemia due to chronic kidney disease   • Tonsil pain   • Healthcare maintenance   • Prediabetes   • Muscle cramping       Past Surgical History:   Procedure Laterality Date   • MULTIPLE CORONARY ARTERY BYPASS ENDO VEIN HARVEST  9/1/2014    Performed by Stephen Nowak M.D. at SURGERY [facility]   • BLADDER NECK CONTRACTURE EXICISION  1970   • GANGLION EXCISION  1969    left wrist   • ABDOMINAL EXPLORATION     • ABDOMINAL HYSTERECTOMY TOTAL      fibroids   • APPENDECTOMY     • DENTAL EXTRACTION(S)         Family History   Problem Relation Age of Onset   • Stroke  Mother    • Hyperlipidemia Mother    • Hypertension Mother    • Lung Disease Mother         smoker   • Heart Disease Father    • Hypertension Father    • Hyperlipidemia Father    • Lung Disease Father         smoker   • Alcohol/Drug Father         etoh   • Other Father         aneurysm       Social History     Tobacco Use   • Smoking status: Former Smoker     Packs/day: 3.00     Years: 55.00     Pack years: 165.00     Types: Cigarettes     Quit date: 2007     Years since quittin.4   • Smokeless tobacco: Never Used   • Tobacco comment: Quit   (Hx 3ppd x 55yr)...    Substance Use Topics   • Alcohol use: No     Alcohol/week: 0.0 oz       Current medications:  (including new changes):  Current Outpatient Medications   Medication Sig Dispense Refill   • atorvastatin (LIPITOR) 80 MG tablet Take 1 Tablet by mouth every evening. N THE EVENING 90 Tablet 4   • metoprolol tartrate (LOPRESSOR) 25 MG Tab Take 1 Tablet by mouth 2 times a day. 180 Tablet 4   • WIXELA INHUB 250-50 MCG/DOSE AEROSOL POWDER, BREATH ACTIVATED INHALE 1 INHALATION EVERY 12 HOURS. 60 Each 2   • lisinopril (PRINIVIL) 40 MG tablet TAKE 1 TABLET BY MOUTH EVERY DAY 90 Tablet 1   • omeprazole (PRILOSEC) 20 MG delayed-release capsule TAKE 1 CAPSULE BY MOUTH EVERY DAY 90 Capsule 1   • furosemide (LASIX) 20 MG Tab Take 0.5 Tablets by mouth every day. (Patient taking differently: Take 20 mg by mouth every day. 1/2 tab for Three times a week) 45 tablet 1   • albuterol 108 (90 Base) MCG/ACT Aero Soln inhalation aerosol Inhale 2 Puffs every four hours as needed (Emergency Rescue use for - Shortness of Breath, or Wheezing.). 1 Each 3   • Probiotic Product (PROBIOTIC DAILY PO) Take  by mouth.     • Calcium Carbonate-Vit D-Min (CALCIUM 1200 PO) Take  by mouth.     • aspirin EC (ECOTRIN) 81 MG TBEC Take 1 Tab by mouth every day. 90 Tab 3     No current facility-administered medications for this visit.       Allergies as of 2022 - Reviewed 2022    Allergen Reaction Noted   • Iodine Itching 09/08/2014   • Oxycodone-acetaminophen Hives 09/08/2014   • Sulfa drugs Vomiting 09/08/2014   • Food  01/08/2020   • Nsaids  08/18/2020                   Review of Symptoms   (as noted elsewhere, and .....)   GEN/CONST:   Denies fever, chills,    CARDIO:   Denies chest pain, or edema.    RESP:   Denies shortness of breath, or coughing.  GI:    Denies nausea, vomiting, diarrhea,      PSYCH:  Denies anxiety, depression,           Physical Exam  /70 (BP Location: Left arm, Patient Position: Sitting, BP Cuff Size: Adult)   Pulse 60   Temp 36.1 °C (97 °F) (Temporal)   Resp 14   Ht 1.524 m (5')   Wt 69.4 kg (153 lb)   SpO2 95%   BMI 29.88 kg/m²   General:  Alert and oriented, No apparent distress.    Lungs: Clear to auscultation bilaterally.  No wheezes or rales.  Cardiovascular: Regular rate and rhythm (~ 60).  No murmurs     +/? Mild/questionable JVD....  Abdomen:  Soft.  Non-tender.  No rebound or guarding.   Extremities:  No leg edema.  Good general motion of extremities.   Psychological: Appears to have normal mood and affect.        Labs:  Recent / Last-Prior labs reviewed.  -    BMP                             Assessment & Plan  (with subjective history and orders):  Of note, the list below is not in a specific nor sortable order.      Problem List Items Addressed This Visit     CKD (chronic kidney disease) stage 3, GFR 30-59 ml/min (HCC)     Chronic and progressing CKD-3, now CKD-3b....  Last GFR ~39, decreased from before.   With elevated Creatinine.   Previously tried reducing Lasix 10, to 3 x/week.  Still had worse renal function.   In setting of prior CABG and diastolic HF  ... unclear if from medication or heart function.      No obvious pedal edema currently.     Mlid/questionable JVD.  - follow-up with Cardiology.  - continue on Lasix for now.  - Avoids NSAIDs and other nephrotoxic.  - Refer to Nephrology.          Relevant Orders    Referral to  Nephrology    Basic Metabolic Panel    MICROALBUMIN CREAT RATIO URINE    Diastolic CHF (HCC)    Relevant Medications    atorvastatin (LIPITOR) 80 MG tablet    metoprolol tartrate (LOPRESSOR) 25 MG Tab    Dyslipidemia    Relevant Medications    atorvastatin (LIPITOR) 80 MG tablet    Essential hypertension     Chronic and ongoing   Patient has grade 2 diastolic dysfunction (with EF 65%), after having had a CABG in 2014, with CAD and DLD.  She is being managed by cardiology (Dr. Braxtno, but retired, and needs new cardiologist....  Needs to call and schedule...).    Currently on      - aspirin 81, Lipitor 80,      - Lasix 10 .. (only Mon, Wed, and Fri). ... since last visit...     - lisinopril 40 d, Lopressor 25 BID.  ... previously with bloating and leg swelling on amlodipine...  Denies:  angina, palpitations, or dyspnea.    In office, BP - 130/70  ... mild/questionable JVD, but worsening GFR.  ... unclear if from medication or possible cardiorenal influence  - Continue other medications as above.   - follow-up with cardiology,  (appreciate their input).      ... (and also nephrology for CKD-3, worsening).    - follow-up here in about 4 weeks.          Relevant Medications    atorvastatin (LIPITOR) 80 MG tablet    metoprolol tartrate (LOPRESSOR) 25 MG Tab    Other Relevant Orders    Referral to Nephrology    Basic Metabolic Panel    MICROALBUMIN CREAT RATIO URINE    Hx of CABG      Of note, the above list is not in a specific nor sortable order.                  Diagnosis Table, with orders:  1. Essential hypertension  metoprolol tartrate (LOPRESSOR) 25 MG Tab    Referral to Nephrology    Basic Metabolic Panel    MICROALBUMIN CREAT RATIO URINE   2. Stage 3b chronic kidney disease (HCC)  Referral to Nephrology    Basic Metabolic Panel    MICROALBUMIN CREAT RATIO URINE   3. Chronic diastolic congestive heart failure (HCC)     4. Hx of CABG     5. Dyslipidemia  atorvastatin (LIPITOR) 80 MG tablet                 Follow-up:   4 months with new labs.           &  With cardiology and nephrology (new referral).               A computerized dictation system may have been used in this note.    Despite review, there may be some errors in spelling or grammar.    Thompson Del Toro M.D.  1/26/2022

## 2022-01-26 NOTE — ASSESSMENT & PLAN NOTE
Chronic and ongoing   Patient has grade 2 diastolic dysfunction (with EF 65%), after having had a CABG in 2014, with CAD and DLD.  She is being managed by cardiology (Dr. Braxton, but retired, and needs new cardiologist....  Needs to call and schedule...).    Currently on      - aspirin 81, Lipitor 80,      - Lasix 10 .. (only Mon, Wed, and Fri). ... since last visit...     - lisinopril 40 d, Lopressor 25 BID.  ... previously with bloating and leg swelling on amlodipine...  Denies:  angina, palpitations, or dyspnea.    In office, BP - 130/70  ... mild/questionable JVD, but worsening GFR.  ... unclear if from medication or possible cardiorenal influence  - Continue other medications as above.   - follow-up with cardiology,  (appreciate their input).      ... (and also nephrology for CKD-3, worsening).    - follow-up here in about 4 weeks.

## 2022-01-26 NOTE — PATIENT INSTRUCTIONS
Please follow-up with the referral(s) to KIDNEY DOCTOR (nephrologist).  You will likely receive a phone call or GIDEENhart message, with information about what office to contact, in order to schedule.  However, if you do not hear from them in the next week or two, please call [phone], and ask for the referral line, to find out the status of the referral.     Please keep your appointment with cardiology.   Thank you.

## 2022-02-08 ENCOUNTER — OFFICE VISIT (OUTPATIENT)
Dept: NEPHROLOGY | Facility: MEDICAL CENTER | Age: 82
End: 2022-02-08
Payer: MEDICARE

## 2022-02-08 VITALS
DIASTOLIC BLOOD PRESSURE: 66 MMHG | SYSTOLIC BLOOD PRESSURE: 116 MMHG | OXYGEN SATURATION: 97 % | WEIGHT: 153 LBS | HEIGHT: 60 IN | RESPIRATION RATE: 16 BRPM | HEART RATE: 59 BPM | TEMPERATURE: 98 F | BODY MASS INDEX: 30.04 KG/M2

## 2022-02-08 DIAGNOSIS — R73.03 PREDIABETES: ICD-10-CM

## 2022-02-08 DIAGNOSIS — N17.9 AKI (ACUTE KIDNEY INJURY) (HCC): ICD-10-CM

## 2022-02-08 DIAGNOSIS — R60.9 EDEMA, UNSPECIFIED TYPE: ICD-10-CM

## 2022-02-08 DIAGNOSIS — I50.32 CHRONIC DIASTOLIC CONGESTIVE HEART FAILURE (HCC): ICD-10-CM

## 2022-02-08 DIAGNOSIS — I10 ESSENTIAL HYPERTENSION: Chronic | ICD-10-CM

## 2022-02-08 DIAGNOSIS — Z86.39 HISTORY OF VITAMIN D DEFICIENCY: ICD-10-CM

## 2022-02-08 PROBLEM — N18.9 ANEMIA DUE TO CHRONIC KIDNEY DISEASE: Status: RESOLVED | Noted: 2020-04-14 | Resolved: 2022-02-08

## 2022-02-08 PROBLEM — D63.1 ANEMIA DUE TO CHRONIC KIDNEY DISEASE: Status: RESOLVED | Noted: 2020-04-14 | Resolved: 2022-02-08

## 2022-02-08 PROCEDURE — 99204 OFFICE O/P NEW MOD 45 MIN: CPT | Performed by: INTERNAL MEDICINE

## 2022-02-08 RX ORDER — FUROSEMIDE 20 MG/1
20 TABLET ORAL DAILY
Qty: 90 TABLET | Refills: 2 | Status: SHIPPED | OUTPATIENT
Start: 2022-02-08 | End: 2023-02-06

## 2022-02-08 ASSESSMENT — FIBROSIS 4 INDEX: FIB4 SCORE: 1.64

## 2022-02-08 ASSESSMENT — ENCOUNTER SYMPTOMS
FEVER: 0
ABDOMINAL PAIN: 0
SHORTNESS OF BREATH: 0

## 2022-02-08 NOTE — PROGRESS NOTES
Chief Complaint   Patient presents with   • New Patient     HTN   • Chronic Kidney Disease       Requesting Provider: Thompson Del Toro M*    HPI:  Valorie Sierra is a 81 y.o. female with HTN, pre-diabetes, chronic diastolic heart failure who presents today to establish nephrology care.     Re: HTN, diagnosed 2014 around her CABG surgery. BP control over the years has been well controlled. She doesn't check BP at home, and doesn't want to check it at home.     Re: Chronic diastolic heart failure.  The patient had CABG surgery in September 2014. She takes lasix 10mg three times a week, but doesn't feel a diuretic effect for some hours. She doesn't remember taking a higher dose than 10mg. She complains of some LE edema, worse at night. She urinates more at night.     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 She had mild MELITA (Scr of ~2) when she had CABG in 2014, denies history of MELITA requiring dialysis.     Past Medical History:   Diagnosis Date   • Anemia    • Anxiety    • Arrhythmia    • Arthritis    • Blood transfusion, without reported diagnosis    • CHF (congestive heart failure) (HCC)    • Chronic airway obstruction, not elsewhere classified    • Depression    • Emphysema    • GERD (gastroesophageal reflux disease)    • Headache, classical migraine    • Heart attack (HCC)    • Heart murmur    • Hyperlipidemia    • Hypertension    • IBD (inflammatory bowel disease)    • Kidney disease    • Ulcer    • Unspecified cataract        Past Surgical History:   Procedure Laterality Date   • MULTIPLE CORONARY ARTERY BYPASS ENDO VEIN HARVEST  9/1/2014    Performed by Stephen Nowak M.D. at SURGERY [facility] ORS   • BLADDER NECK CONTRACTURE EXICISION  1970   • GANGLION EXCISION  1969    left wrist   • ABDOMINAL EXPLORATION     • ABDOMINAL HYSTERECTOMY TOTAL      fibroids   • APPENDECTOMY     • DENTAL EXTRACTION(S)          Outpatient Encounter Medications as of 2/8/2022   Medication Sig Dispense Refill   • atorvastatin (LIPITOR) 80 MG  tablet Take 1 Tablet by mouth every evening. N THE EVENING (Patient taking differently: Take 80 mg by mouth. I/2 three x weekly) 90 Tablet 4   • metoprolol tartrate (LOPRESSOR) 25 MG Tab Take 1 Tablet by mouth 2 times a day. 180 Tablet 4   • WIXELA INHUB 250-50 MCG/DOSE AEROSOL POWDER, BREATH ACTIVATED INHALE 1 INHALATION EVERY 12 HOURS. 60 Each 2   • lisinopril (PRINIVIL) 40 MG tablet TAKE 1 TABLET BY MOUTH EVERY DAY 90 Tablet 1   • omeprazole (PRILOSEC) 20 MG delayed-release capsule TAKE 1 CAPSULE BY MOUTH EVERY DAY 90 Capsule 1   • albuterol 108 (90 Base) MCG/ACT Aero Soln inhalation aerosol Inhale 2 Puffs every four hours as needed (Emergency Rescue use for - Shortness of Breath, or Wheezing.). 1 Each 3   • Probiotic Product (PROBIOTIC DAILY PO) Take  by mouth.     • Calcium Carbonate-Vit D-Min (CALCIUM 1200 PO) Take  by mouth.     • aspirin EC (ECOTRIN) 81 MG TBEC Take 1 Tab by mouth every day. 90 Tab 3   • furosemide (LASIX) 20 MG Tab Take 0.5 Tablets by mouth every day. (Patient taking differently: Take 20 mg by mouth every day. 1/2 tab for Three times a week) 45 tablet 1     No facility-administered encounter medications on file as of 2022.        Allergies   Allergen Reactions   • Iodine Itching   • Oxycodone-Acetaminophen Hives   • Sulfa Drugs Vomiting   • Food      avocado   • Nsaids      Pt states she was told to never take these because of her kidneys.       Social History     Socioeconomic History   • Marital status:      Spouse name: Not on file   • Number of children: Not on file   • Years of education: Not on file   • Highest education level: Not on file   Occupational History   • Not on file   Tobacco Use   • Smoking status: Former Smoker     Packs/day: 3.00     Years: 55.00     Pack years: 165.00     Types: Cigarettes     Quit date: 2007     Years since quittin.4   • Smokeless tobacco: Never Used   • Tobacco comment: Quit   (Hx 3ppd x 55yr)...    Vaping Use   • Vaping Use:  Never used   Substance and Sexual Activity   • Alcohol use: No     Alcohol/week: 0.0 oz   • Drug use: No   • Sexual activity: Never     Comment: lives in a motel (prev.w.son-who  ), dtr  in , ,    Other Topics Concern   • Not on file   Social History Narrative   • Not on file     Social Determinants of Health     Financial Resource Strain:    • Difficulty of Paying Living Expenses: Not on file   Food Insecurity:    • Worried About Running Out of Food in the Last Year: Not on file   • Ran Out of Food in the Last Year: Not on file   Transportation Needs:    • Lack of Transportation (Medical): Not on file   • Lack of Transportation (Non-Medical): Not on file   Physical Activity:    • Days of Exercise per Week: Not on file   • Minutes of Exercise per Session: Not on file   Stress:    • Feeling of Stress : Not on file   Social Connections:    • Frequency of Communication with Friends and Family: Not on file   • Frequency of Social Gatherings with Friends and Family: Not on file   • Attends Jewish Services: Not on file   • Active Member of Clubs or Organizations: Not on file   • Attends Club or Organization Meetings: Not on file   • Marital Status: Not on file   Intimate Partner Violence:    • Fear of Current or Ex-Partner: Not on file   • Emotionally Abused: Not on file   • Physically Abused: Not on file   • Sexually Abused: Not on file   Housing Stability:    • Unable to Pay for Housing in the Last Year: Not on file   • Number of Places Lived in the Last Year: Not on file   • Unstable Housing in the Last Year: Not on file       Family History   Problem Relation Age of Onset   • Stroke Mother    • Hyperlipidemia Mother    • Hypertension Mother    • Lung Disease Mother         smoker   • Heart Disease Father    • Hypertension Father    • Hyperlipidemia Father    • Lung Disease Father         smoker   • Alcohol/Drug Father         etoh   • Other Father         aneurysm   • Kidney Disease Neg Hx         Review of Systems   Constitutional: Negative for fever.   Respiratory: Negative for shortness of breath.    Cardiovascular: Negative for chest pain.   Gastrointestinal: Negative for abdominal pain.   Genitourinary: Negative for dysuria.   All other systems reviewed and are negative.      /66 (BP Location: Right arm, Patient Position: Sitting)   Pulse (!) 59   Temp 36.7 °C (98 °F) (Temporal)   Resp 16   Ht 1.524 m (5')   Wt 69.4 kg (153 lb)   SpO2 97%   BMI 29.88 kg/m²     Physical Exam  Constitutional:       General: She is not in acute distress.  HENT:      Mouth/Throat:      Pharynx: No oropharyngeal exudate.   Eyes:      General: No scleral icterus.  Neck:      Trachea: No tracheal deviation.   Cardiovascular:      Rate and Rhythm: Normal rate and regular rhythm.      Heart sounds: Normal heart sounds. No murmur heard.      Pulmonary:      Effort: Pulmonary effort is normal.      Breath sounds: Normal breath sounds. No stridor. No rales.   Abdominal:      General: Bowel sounds are normal.      Palpations: Abdomen is soft.      Tenderness: There is no abdominal tenderness.   Musculoskeletal:         General: Normal range of motion.      Cervical back: Neck supple.      Right lower leg: Edema (1-2+) present.      Left lower leg: Edema (1-2+) present.   Skin:     General: Skin is warm and dry.      Findings: No rash.   Neurological:      General: No focal deficit present.      Mental Status: She is alert and oriented to person, place, and time.   Psychiatric:         Mood and Affect: Mood and affect normal.         Behavior: Behavior normal.           Labs reviewed.    Recent Labs     05/28/21  0810 06/23/21  0910 07/22/21  0950 12/01/21  0821 01/14/22  0933   ALBUMIN 4.2  --   --  4.8  --    HDL 76  --   --   --   --    TRIGLYCERIDE 71  --   --   --   --    SODIUM 140   < > 139 140 139   POTASSIUM 4.8   < > 5.2 4.9 4.5   CHLORIDE 106   < > 103 104 104   CO2 25   < > 24 28 23   BUN 26*   < > 30*  27* 32*   CREATININE 0.89   < > 1.09 1.17 1.30    < > = values in this interval not displayed.       Lab Results   Component Value Date/Time    WBC 7.7 05/28/2021 08:10 AM    RBC 4.18 (L) 05/28/2021 08:10 AM    HEMOGLOBIN 13.0 05/28/2021 08:10 AM    HEMATOCRIT 40.0 05/28/2021 08:10 AM    MCV 95.7 05/28/2021 08:10 AM    MCH 31.1 05/28/2021 08:10 AM    MCHC 32.5 (L) 05/28/2021 08:10 AM    MPV 10.6 05/28/2021 08:10 AM           URINALYSIS:  Lab Results   Component Value Date/Time    COLORURINE Lt. Yellow 09/19/2014 0255    CLARITY Clear 09/19/2014 0255    SPECGRAVITY 1.008 09/19/2014 0255    PHURINE 6.0 09/19/2014 0255    KETONES Negative 09/19/2014 0255    PROTEINURIN Negative 09/19/2014 0255    BILIRUBINUR Negative 09/19/2014 0255    NITRITE Negative 09/19/2014 0255    LEUKESTERAS Trace (A) 09/19/2014 0255    OCCULTBLOOD Trace (A) 09/19/2014 0255     UPC  No results found for: TOTPROTUR   Lab Results   Component Value Date/Time    CREATININEU 96.20 09/03/2014 1745         Imaging reviewed  No orders to display         Assessment:  Valorie Sierra is a 81 y.o. female with HTN, pre-diabetes, chronic diastolic heart failure who presents today to establish nephrology care.     Plan:  1. MELITA vs CKD progression  - Underlying CKD likely from hypertension and age-related kidney decline.  I recommend avoid NSAIDs and other nephrotoxins.  I recommend a low-salt diet.  I explained the importance of blood pressure and glycemic control to help slow CKD progression.  Unclear if patient has MELITA or CKD progression.  Patient is somewhat volume overloaded, and I recommend increasing Lasix as below.  Check labs again in 1 week after Lasix dose change.    2. Prediabetes  -I explained the importance of glycemic control to help slow CKD progression.  Patient currently diet controlled    3. Essential hypertension  -Well-controlled.  Continue lisinopril at maximally tolerated dose.    4. Chronic diastolic congestive heart failure  (HCC)  -Patient somewhat volume overloaded today.  Patient's dose of Lasix is ineffective, as Lasix must be dosed high enough to produce an effective diuresis within 1 hour.  Recommend increase Lasix to 20 mg p.o. daily, with option to increase to 40 mg p.o. daily if 20 mg dose is ineffective.  I anticipate with some reduction in her volume overload that her creatinine might rise, but this does not reflect MELITA, but rather unmasked her true degree of CKD.  Continue ACE inhibitor and beta-blocker.  Defer further management to primary care.        Return to clinic 4 months with preclinic labs    Vitaliy Simon MD  Nephrology

## 2022-02-08 NOTE — PATIENT INSTRUCTIONS
MEDICATION CHANGE:  - Take Furosemide 20mg every day once a day. Monitor yourself and see if it makes you urinate within one hour. If it doesn't seem like it kicks in for 2-3 hours, try taking 40mg all at once and see how that makes you urinate.     Call my office if the 20mg dose does not make you urinate, but the 40mg dose does, as I will need to update your medication record and update the prescription for 40mg daily.     In about one week after finding your new dose of furosemide, go back to the laboratory for repeat blood and urine tests. There is NO NEED to fast.

## 2022-02-15 ENCOUNTER — HOSPITAL ENCOUNTER (OUTPATIENT)
Dept: LAB | Facility: MEDICAL CENTER | Age: 82
End: 2022-02-15
Attending: INTERNAL MEDICINE
Payer: MEDICARE

## 2022-02-15 DIAGNOSIS — R73.03 PREDIABETES: ICD-10-CM

## 2022-02-15 DIAGNOSIS — R60.9 EDEMA, UNSPECIFIED TYPE: ICD-10-CM

## 2022-02-15 DIAGNOSIS — N17.9 AKI (ACUTE KIDNEY INJURY) (HCC): ICD-10-CM

## 2022-02-15 LAB
ALBUMIN SERPL BCP-MCNC: 4.4 G/DL (ref 3.2–4.9)
ANION GAP SERPL CALC-SCNC: 12 MMOL/L (ref 7–16)
APPEARANCE UR: CLEAR
BACTERIA #/AREA URNS HPF: ABNORMAL /HPF
BILIRUB UR QL STRIP.AUTO: NEGATIVE
BUN SERPL-MCNC: 28 MG/DL (ref 8–22)
CALCIUM SERPL-MCNC: 10 MG/DL (ref 8.5–10.5)
CHLORIDE SERPL-SCNC: 100 MMOL/L (ref 96–112)
CO2 SERPL-SCNC: 26 MMOL/L (ref 20–33)
COLOR UR: YELLOW
CREAT SERPL-MCNC: 1.16 MG/DL (ref 0.5–1.4)
CREAT UR-MCNC: 17.72 MG/DL
CREAT UR-MCNC: 18.22 MG/DL
EPI CELLS #/AREA URNS HPF: NEGATIVE /HPF
GLUCOSE SERPL-MCNC: 96 MG/DL (ref 65–99)
GLUCOSE UR STRIP.AUTO-MCNC: NEGATIVE MG/DL
HYALINE CASTS #/AREA URNS LPF: ABNORMAL /LPF
KETONES UR STRIP.AUTO-MCNC: NEGATIVE MG/DL
LEUKOCYTE ESTERASE UR QL STRIP.AUTO: ABNORMAL
MICRO URNS: ABNORMAL
MICROALBUMIN UR-MCNC: <1.2 MG/DL
MICROALBUMIN/CREAT UR: NORMAL MG/G (ref 0–30)
NITRITE UR QL STRIP.AUTO: NEGATIVE
PH UR STRIP.AUTO: 6 [PH] (ref 5–8)
POTASSIUM SERPL-SCNC: 4.7 MMOL/L (ref 3.6–5.5)
PROT UR QL STRIP: NEGATIVE MG/DL
PROT UR-MCNC: <4 MG/DL (ref 0–15)
PROT/CREAT UR: NORMAL MG/G (ref 10–107)
RBC # URNS HPF: ABNORMAL /HPF
RBC UR QL AUTO: NEGATIVE
SODIUM SERPL-SCNC: 138 MMOL/L (ref 135–145)
SP GR UR STRIP.AUTO: 1.01
UROBILINOGEN UR STRIP.AUTO-MCNC: 0.2 MG/DL
WBC #/AREA URNS HPF: ABNORMAL /HPF

## 2022-02-15 PROCEDURE — 82040 ASSAY OF SERUM ALBUMIN: CPT

## 2022-02-15 PROCEDURE — 81001 URINALYSIS AUTO W/SCOPE: CPT

## 2022-02-15 PROCEDURE — 36415 COLL VENOUS BLD VENIPUNCTURE: CPT

## 2022-02-15 PROCEDURE — 87086 URINE CULTURE/COLONY COUNT: CPT

## 2022-02-15 PROCEDURE — 87077 CULTURE AEROBIC IDENTIFY: CPT

## 2022-02-15 PROCEDURE — 82043 UR ALBUMIN QUANTITATIVE: CPT

## 2022-02-15 PROCEDURE — 80048 BASIC METABOLIC PNL TOTAL CA: CPT

## 2022-02-15 PROCEDURE — 87186 SC STD MICRODIL/AGAR DIL: CPT

## 2022-02-15 PROCEDURE — 84156 ASSAY OF PROTEIN URINE: CPT

## 2022-02-15 PROCEDURE — 82570 ASSAY OF URINE CREATININE: CPT

## 2022-02-17 LAB
BACTERIA UR CULT: ABNORMAL
BACTERIA UR CULT: ABNORMAL
SIGNIFICANT IND 70042: ABNORMAL
SITE SITE: ABNORMAL
SOURCE SOURCE: ABNORMAL

## 2022-02-21 ENCOUNTER — TELEPHONE (OUTPATIENT)
Dept: NEPHROLOGY | Facility: MEDICAL CENTER | Age: 82
End: 2022-02-21
Payer: MEDICARE

## 2022-02-21 NOTE — TELEPHONE ENCOUNTER
Kidney labs improved.   Attempted to call patient at all numbers listed, but there was no answer on any of the numbers.    UA suggestive of possible UTI, but patient had no symptoms of UTI at last visit, so I would only treat with antibiotics such as augmentin if she has symptoms such as burning or pain with urination.    I left a voicemail with her emergency contact, Joseph.     Vitaliy Simon MD  Nephrology

## 2022-03-16 NOTE — ED PROVIDER NOTES
"ED Provider Note    Scribed for Patrick Machado M.D. by Olivia Mcdonough. 11/7/2019, 2:30 PM.    Primary care provider: Willow Cavanaugh M.D.  Means of arrival: Ambulance  History obtained from: Patient  History limited by: None    CHIEF COMPLAINT  Chief Complaint   Patient presents with   • Cough     HPI  Valorie Sierra is a 79 y.o. female smoker with a history of hypertension who presents to the Emergency Department brought in by ambulance with complaints of a persistent chronic productive cough onset 1 month ago. Associated symptoms include intermittent tactile fever. Patient also endorses abdominal discomfort which she describes as a \"jumping stomach\" waking her up from her sleep. Additionally, patient notes that her blood pressure is elevated but she has been taking her medication as instructed and she denies ever missing a dose.     REVIEW OF SYSTEMS  Pertinent positives include cough, abdominal discomfort, tactile fever, sputum production. As above, all other systems reviewed and are negative.   See HPI for further details.     PAST MEDICAL HISTORY   has a past medical history of Anemia, Anxiety, Arrhythmia, Arthritis, Blood transfusion, without reported diagnosis, CHF (congestive heart failure) (HCC), Chronic airway obstruction, not elsewhere classified, Depression, Emphysema, GERD (gastroesophageal reflux disease), Headache, classical migraine, Heart attack (HCC), Heart murmur, Hyperlipidemia, Hypertension, IBD (inflammatory bowel disease), Kidney disease, Ulcer, and Unspecified cataract.    SURGICAL HISTORY   has a past surgical history that includes multiple coronary artery bypass endo vein harvest (9/1/2014); abdominal hysterectomy total; bladder neck contracture exicision (1970); ganglion excision (1969); abdominal exploration; appendectomy; and dental extraction(s).    SOCIAL HISTORY  Social History     Tobacco Use   • Smoking status: Former Smoker     Packs/day: 3.00     Years: 55.00     Pack " years: 165.00     Types: Cigarettes     Last attempt to quit: 2007     Years since quittin.2   • Smokeless tobacco: Never Used   Substance Use Topics   • Alcohol use: No     Alcohol/week: 0.0 oz     Comment:    • Drug use: No      Social History     Substance and Sexual Activity   Drug Use No       FAMILY HISTORY  Family History   Problem Relation Age of Onset   • Stroke Mother    • Hyperlipidemia Mother    • Hypertension Mother    • Lung Disease Mother         smoker   • Heart Disease Father    • Hypertension Father    • Hyperlipidemia Father    • Lung Disease Father         smoker   • Alcohol/Drug Father         etoh   • Other Father         aneurysm   • Genetic Disorder Maternal Aunt         Parkinsons   • Heart Disease Paternal Aunt    • Hypertension Paternal Aunt    • Hyperlipidemia Paternal Aunt    • Psychiatric Illness Paternal Aunt         dementia   • Lung Disease Paternal Aunt         smoker   • Heart Disease Paternal Uncle    • Hypertension Paternal Uncle    • Hyperlipidemia Paternal Uncle    • Arthritis Maternal Grandmother         osteoporosis   • Lung Disease Maternal Grandmother         smoker   • Heart Disease Paternal Grandmother    • Hypertension Paternal Grandmother    • Hyperlipidemia Paternal Grandmother    • Stroke Paternal Grandmother         dvt       CURRENT MEDICATIONS  No current facility-administered medications for this encounter.     Current Outpatient Medications:   •  azithromycin (ZITHROMAX) 250 MG Tab, z pack as directed, Disp: 6 Tab, Rfl: 0  •  Probiotic Product (PROBIOTIC DAILY PO), Take  by mouth., Disp: , Rfl:   •  SYMBICORT 80-4.5 MCG/ACT Aerosol, INHALE 2 PUFFS BY MOUTH 2 TIMES A DAY., Disp: 1 Inhaler, Rfl: 0  •  atorvastatin (LIPITOR) 80 MG tablet, Take 1 Tab by mouth every evening., Disp: 90 Tab, Rfl: 3  •  lisinopril (PRINIVIL) 20 MG Tab, Take 1 Tab by mouth every day., Disp: 90 Tab, Rfl: 3  •  metoprolol (LOPRESSOR) 25 MG Tab, Take 0.5 Tabs by  mouth 2 times a day., Disp: 90 Tab, Rfl: 3  •  albuterol 108 (90 Base) MCG/ACT Aero Soln inhalation aerosol, Inhale 2 Puffs by mouth every 6 hours as needed for Shortness of Breath., Disp: 8.5 g, Rfl: 3  •  omeprazole (PRILOSEC) 20 MG delayed-release capsule, TAKE 1 CAP BY MOUTH EVERY DAY., Disp: 90 Cap, Rfl: 3  •  furosemide (LASIX) 20 MG Tab, Take 1/2 tablet 3 times a week, Disp: 45 Tab, Rfl: 0  •  Calcium Carbonate-Vit D-Min (CALCIUM 1200 PO), Take  by mouth., Disp: , Rfl:   •  aspirin EC (ECOTRIN) 81 MG TBEC, Take 1 Tab by mouth every day., Disp: 90 Tab, Rfl: 3    ALLERGIES  Allergies   Allergen Reactions   • Iodine Itching   • Oxycodone-Acetaminophen Hives   • Sulfa Drugs Vomiting       PHYSICAL EXAM  VITAL SIGNS: BP (!) 200/47   Pulse 65   Temp 36.7 °C (98 °F) (Temporal)   Resp 20   Ht 1.524 m (5')   Wt 70.3 kg (155 lb)   BMI 30.27 kg/m²   Vitals reviewed.    Constitutional: Alert in no apparent distress.  HENT: No signs of trauma, Bilateral external ears normal, Nose normal.   Eyes: Pupils are equal and reactive, Conjunctiva normal, Non-icteric.   Neck: Normal range of motion, No tenderness, Supple, No stridor.   Lymphatic: No lymphadenopathy noted.   Cardiovascular: Regular rate and rhythm, no murmurs.   Thorax & Lungs: Normal breath sounds, No respiratory distress, No wheezing, No chest tenderness.   Abdomen: Bowel sounds normal, Soft, No tenderness, No peritoneal signs, No masses, No pulsatile masses.   Skin: Warm, Dry, No erythema, No rash.   Back: No bony tenderness, No CVA tenderness.   Extremities: Intact distal pulses, No edema, No tenderness, No cyanosis  Musculoskeletal: Good range of motion in all major joints. No tenderness to palpation or major deformities noted.   Neurologic: Alert , Normal motor function, Normal sensory function, No focal deficits noted.   Psychiatric: Affect normal, Judgment normal, Mood normal.     DIAGNOSTIC STUDIES / PROCEDURES    LABS  Labs Reviewed   COMP METABOLIC  "PANEL - Abnormal; Notable for the following components:       Result Value    Glucose 119 (*)     All other components within normal limits   PROBRAIN NATRIURETIC PEPTIDE, NT - Abnormal; Notable for the following components:    NT-proBNP 871 (*)     All other components within normal limits   CBC WITH DIFFERENTIAL - Abnormal; Notable for the following components:    RBC 3.64 (*)     Hemoglobin 11.1 (*)     Hematocrit 34.3 (*)     MCHC 32.4 (*)     RDW 51.4 (*)     Monos (Absolute) 0.87 (*)     All other components within normal limits   ESTIMATED GFR - Abnormal; Notable for the following components:    GFR If  57 (*)     GFR If Non  47 (*)     All other components within normal limits   LACTIC ACID   BLOOD CULTURE    Narrative:     Per Hospital Policy: Only change Specimen Src: to \"Line\" if  specified by physician order.   BLOOD CULTURE    Narrative:     Per Hospital Policy: Only change Specimen Src: to \"Line\" if  specified by physician order.   INFLUENZA A/B BY PCR      All labs reviewed by me.    RADIOLOGY  DX-CHEST-PORTABLE (1 VIEW)   Final Result         No acute cardiac or pulmonary abnormality is identified.        The radiologist's interpretation of all radiological studies have been reviewed by me.    COURSE & MEDICAL DECISION MAKING  Nursing notes, VS, PMSFHx reviewed in chart.  Differential diagnoses include but not limited to: Bronchitis, pneumonia, medication side effect from Lisinopril.      Obtained and reviewed past medical records which indicated the patient smokes 3 packs of cigarettes a day for the past 55 years.    2:30 PM - Patient seen and examined at bedside. Plan of care was discussed with the patient which includes chest x-ray to rule out pneumonia, flu test to rule out influenza, and blood work. Patient verbalized her understanding and agrees with the plan of care. Ordered for DX-chest, CBC with differential, CMP, ProBrain Natriuretic Peptide, lactic acid, " blood culture, and influenza by PCR to evaluate.     The patient's cough is been going on for 1 month, she possibly has pertussis.  She will be prescribed a Z-Dorian.    The patient will return for new or worsening symptoms and is stable at the time of discharge.    The patient is referred to a primary physician for blood pressure management, diabetic screening, and for all other preventative health concerns.        DISPOSITION:  Patient will be discharged home in stable condition.    FOLLOW UP:  [facility], Emergency Dept  [address] [facility] [address] [address] [phone]    If symptoms worsen    Wilolw Cavanaugh M.D.  [address] [facility] [address] [phone]      As needed      OUTPATIENT MEDICATIONS:  New Prescriptions    AZITHROMYCIN (ZITHROMAX) 250 MG TAB    z pack as directed         FINAL IMPRESSION  1. Acute bronchitis, unspecified organism          Olivia WAGGONER ([place]), am scribing for, and in the presence of, Patrick Machado M.D..    Electronically signed by: Olivia Mcdonough ([place]), 11/7/2019    Patrick WAGGONER M.D. personally performed the services described in this documentation, as scribed by Olivia Mcdonough in my presence, and it is both accurate and complete. C    The note accurately reflects work and decisions made by me.  Patrick Machado  11/7/2019  5:23 PM            [employer]: 5537 [employer]: 5263

## 2022-04-05 DIAGNOSIS — J43.1 PANLOBULAR EMPHYSEMA (HCC): ICD-10-CM

## 2022-05-05 ENCOUNTER — HOSPITAL ENCOUNTER (OUTPATIENT)
Dept: LAB | Facility: MEDICAL CENTER | Age: 82
End: 2022-05-05
Attending: STUDENT IN AN ORGANIZED HEALTH CARE EDUCATION/TRAINING PROGRAM
Payer: MEDICARE

## 2022-05-05 DIAGNOSIS — N18.32 STAGE 3B CHRONIC KIDNEY DISEASE: ICD-10-CM

## 2022-05-05 DIAGNOSIS — I10 ESSENTIAL HYPERTENSION: ICD-10-CM

## 2022-05-05 LAB
ANION GAP SERPL CALC-SCNC: 11 MMOL/L (ref 7–16)
BUN SERPL-MCNC: 32 MG/DL (ref 8–22)
CALCIUM SERPL-MCNC: 10.5 MG/DL (ref 8.5–10.5)
CHLORIDE SERPL-SCNC: 100 MMOL/L (ref 96–112)
CO2 SERPL-SCNC: 27 MMOL/L (ref 20–33)
CREAT SERPL-MCNC: 1.24 MG/DL (ref 0.5–1.4)
CREAT UR-MCNC: 16.02 MG/DL
GFR SERPLBLD CREATININE-BSD FMLA CKD-EPI: 43 ML/MIN/1.73 M 2
GLUCOSE SERPL-MCNC: 101 MG/DL (ref 65–99)
MICROALBUMIN UR-MCNC: <1.2 MG/DL
MICROALBUMIN/CREAT UR: NORMAL MG/G (ref 0–30)
POTASSIUM SERPL-SCNC: 4.7 MMOL/L (ref 3.6–5.5)
SODIUM SERPL-SCNC: 138 MMOL/L (ref 135–145)

## 2022-05-05 PROCEDURE — 82570 ASSAY OF URINE CREATININE: CPT

## 2022-05-05 PROCEDURE — 36415 COLL VENOUS BLD VENIPUNCTURE: CPT

## 2022-05-05 PROCEDURE — 82043 UR ALBUMIN QUANTITATIVE: CPT

## 2022-05-05 PROCEDURE — 80048 BASIC METABOLIC PNL TOTAL CA: CPT

## 2022-05-12 ENCOUNTER — OFFICE VISIT (OUTPATIENT)
Dept: CARDIOLOGY | Facility: MEDICAL CENTER | Age: 82
End: 2022-05-12
Payer: MEDICARE

## 2022-05-12 VITALS
BODY MASS INDEX: 29.9 KG/M2 | RESPIRATION RATE: 14 BRPM | HEART RATE: 62 BPM | DIASTOLIC BLOOD PRESSURE: 80 MMHG | WEIGHT: 152.3 LBS | HEIGHT: 60 IN | OXYGEN SATURATION: 97 % | SYSTOLIC BLOOD PRESSURE: 134 MMHG

## 2022-05-12 DIAGNOSIS — E78.5 DYSLIPIDEMIA: ICD-10-CM

## 2022-05-12 DIAGNOSIS — I27.20 PULMONARY HYPERTENSION (HCC): ICD-10-CM

## 2022-05-12 DIAGNOSIS — R25.2 MUSCLE CRAMPING: ICD-10-CM

## 2022-05-12 DIAGNOSIS — N18.31 STAGE 3A CHRONIC KIDNEY DISEASE: ICD-10-CM

## 2022-05-12 DIAGNOSIS — R73.03 PREDIABETES: ICD-10-CM

## 2022-05-12 DIAGNOSIS — I10 ESSENTIAL HYPERTENSION: Chronic | ICD-10-CM

## 2022-05-12 DIAGNOSIS — I10 ESSENTIAL HYPERTENSION: ICD-10-CM

## 2022-05-12 DIAGNOSIS — I50.32 CHRONIC DIASTOLIC CONGESTIVE HEART FAILURE (HCC): ICD-10-CM

## 2022-05-12 DIAGNOSIS — I25.10 CORONARY ARTERY DISEASE INVOLVING NATIVE CORONARY ARTERY OF NATIVE HEART WITHOUT ANGINA PECTORIS: ICD-10-CM

## 2022-05-12 DIAGNOSIS — Z95.1 HX OF CABG: ICD-10-CM

## 2022-05-12 PROCEDURE — 99214 OFFICE O/P EST MOD 30 MIN: CPT | Performed by: NURSE PRACTITIONER

## 2022-05-12 RX ORDER — LISINOPRIL 40 MG/1
40 TABLET ORAL DAILY
Qty: 90 TABLET | Refills: 0 | Status: SHIPPED | OUTPATIENT
Start: 2022-05-12 | End: 2022-06-14 | Stop reason: SDUPTHER

## 2022-05-12 ASSESSMENT — ENCOUNTER SYMPTOMS
ABDOMINAL PAIN: 0
SHORTNESS OF BREATH: 0
HEADACHES: 0
CLAUDICATION: 0
FEVER: 0
NERVOUS/ANXIOUS: 1
PND: 0
MYALGIAS: 0
DIZZINESS: 0
ORTHOPNEA: 0
PALPITATIONS: 0
COUGH: 0
BRUISES/BLEEDS EASILY: 1

## 2022-05-12 ASSESSMENT — FIBROSIS 4 INDEX: FIB4 SCORE: 1.64

## 2022-05-12 NOTE — PROGRESS NOTES
Chief Complaint   Patient presents with   • Congestive Heart Failure     F/V Dx:Chronic diastolic congestive heart failure (HCC)   • Coronary Artery Bypass Graft (CABG)     F/V Dx:    Hx of CABG     • Coronary Artery Disease     F/V Dx:CAD - Coronary artery disease involving native coronary artery of native heart without angina pectoris     Subjective     Valorie Sierra is a 81 y.o. female who presents today for 6 month follow up.    Hx of HTN, HLD, CAD with prior CABG in '14 by She, pulmonary HTN with obesity, COPD with former smoking hx and pulmonary HTN, DM type II, CKD stage 3a, and anemia.    She lives in a group home.    She is scared to take her BP at home. She has been seeing her PCP for BP management, she sees him in a few weeks for BP check.    She has mild fatigue and exertional shortness of breath. She doesn't exercise.    She has leg cramps in both legs, worse at night but improving at times.    She has chronic mild LE edema since her CABG.    She has no chest pain, dizziness/lightheadedness, or palpitations.    Past Medical History:   Diagnosis Date   • Anemia    • Anxiety    • Arrhythmia    • Arthritis    • Blood transfusion, without reported diagnosis    • CHF (congestive heart failure) (HCC)    • Chronic airway obstruction, not elsewhere classified    • Depression    • Emphysema    • GERD (gastroesophageal reflux disease)    • Headache, classical migraine    • Heart attack (HCC)    • Heart murmur    • Hyperlipidemia    • Hypertension    • IBD (inflammatory bowel disease)    • Kidney disease    • Ulcer    • Unspecified cataract      Past Surgical History:   Procedure Laterality Date   • MULTIPLE CORONARY ARTERY BYPASS ENDO VEIN HARVEST  9/1/2014    Performed by Stephen Nowak M.D. at SURGERY [facility] ORS   • BLADDER NECK CONTRACTURE EXICISION  1970   • GANGLION EXCISION  1969    left wrist   • ABDOMINAL EXPLORATION     • ABDOMINAL HYSTERECTOMY TOTAL      fibroids   • APPENDECTOMY     •  DENTAL EXTRACTION(S)       Family History   Problem Relation Age of Onset   • Stroke Mother    • Hyperlipidemia Mother    • Hypertension Mother    • Lung Disease Mother         smoker   • Heart Disease Father    • Hypertension Father    • Hyperlipidemia Father    • Lung Disease Father         smoker   • Alcohol/Drug Father         etoh   • Other Father         aneurysm   • Kidney Disease Neg Hx      Social History     Socioeconomic History   • Marital status:      Spouse name: Not on file   • Number of children: Not on file   • Years of education: Not on file   • Highest education level: Not on file   Occupational History   • Not on file   Tobacco Use   • Smoking status: Former Smoker     Packs/day: 3.00     Years: 55.00     Pack years: 165.00     Types: Cigarettes     Quit date: 2007     Years since quittin.7   • Smokeless tobacco: Never Used   • Tobacco comment: Quit   (Hx 3ppd x 55yr)...    Vaping Use   • Vaping Use: Never used   Substance and Sexual Activity   • Alcohol use: No     Alcohol/week: 0.0 oz   • Drug use: No   • Sexual activity: Never     Comment: lives in a motel (prev.w.son-who  ), dtr  in , ,    Other Topics Concern   • Not on file   Social History Narrative   • Not on file     Social Determinants of Health     Financial Resource Strain: Not on file   Food Insecurity: Not on file   Transportation Needs: Not on file   Physical Activity: Not on file   Stress: Not on file   Social Connections: Not on file   Intimate Partner Violence: Not on file   Housing Stability: Not on file     Allergies   Allergen Reactions   • Iodine Itching   • Oxycodone-Acetaminophen Hives   • Sulfa Drugs Vomiting   • Food      avocado   • Nsaids      Pt states she was told to never take these because of her kidneys.     Outpatient Encounter Medications as of 2022   Medication Sig Dispense Refill   • lisinopril (PRINIVIL) 40 MG tablet TAKE 1 TABLET BY MOUTH EVERY DAY 90 Tablet 0  "  • WIXELA INHUB 250-50 MCG/DOSE AEROSOL POWDER, BREATH ACTIVATED INHALE 1 INHALATION EVERY 12 HOURS. 60 Each 2   • furosemide (LASIX) 20 MG Tab Take 1 Tablet by mouth every day. 90 Tablet 2   • atorvastatin (LIPITOR) 80 MG tablet Take 1 Tablet by mouth every evening. N THE EVENING (Patient taking differently: Take 80 mg by mouth. I/2 three x weekly) 90 Tablet 4   • metoprolol tartrate (LOPRESSOR) 25 MG Tab Take 1 Tablet by mouth 2 times a day. 180 Tablet 4   • omeprazole (PRILOSEC) 20 MG delayed-release capsule TAKE 1 CAPSULE BY MOUTH EVERY DAY 90 Capsule 1   • albuterol 108 (90 Base) MCG/ACT Aero Soln inhalation aerosol Inhale 2 Puffs every four hours as needed (Emergency Rescue use for - Shortness of Breath, or Wheezing.). 1 Each 3   • Probiotic Product (PROBIOTIC DAILY PO) Take  by mouth.     • Calcium Carbonate-Vit D-Min (CALCIUM 1200 PO) Take  by mouth.     • aspirin EC (ECOTRIN) 81 MG TBEC Take 1 Tab by mouth every day. 90 Tab 3   • [DISCONTINUED] lisinopril (PRINIVIL) 40 MG tablet TAKE 1 TABLET BY MOUTH EVERY DAY 90 Tablet 1     No facility-administered encounter medications on file as of 5/12/2022.     Review of Systems   Constitutional: Negative for fever and malaise/fatigue.   Respiratory: Negative for cough and shortness of breath.    Cardiovascular: Positive for leg swelling. Negative for chest pain, palpitations, orthopnea, claudication and PND.        Leg swelling since CABG surgery, mild   Gastrointestinal: Negative for abdominal pain.   Musculoskeletal: Negative for myalgias.        Leg cramps at night   Neurological: Negative for dizziness and headaches.        Neuropathy in left lower leg   Endo/Heme/Allergies: Bruises/bleeds easily.   Psychiatric/Behavioral: The patient is nervous/anxious.         \"Doesn't like to take BP readings at home because she is scared\"              Objective     BP (!) 142/82 (BP Location: Left arm, Patient Position: Sitting, BP Cuff Size: Adult)   Pulse 62   Resp 14  "  Ht 1.524 m (5')   Wt 69.1 kg (152 lb 4.8 oz)   SpO2 97%   BMI 29.74 kg/m²     Physical Exam  Vitals and nursing note reviewed.   Constitutional:       Appearance: Normal appearance. She is well-developed. She is obese.   HENT:      Head: Normocephalic and atraumatic.   Neck:      Vascular: No JVD.   Cardiovascular:      Rate and Rhythm: Normal rate and regular rhythm.      Pulses: Normal pulses.      Heart sounds: Normal heart sounds.   Pulmonary:      Effort: Pulmonary effort is normal.      Breath sounds: Normal breath sounds.   Musculoskeletal:         General: Normal range of motion.      Right lower leg: Edema present.      Left lower leg: Edema present.      Comments: Mild LE edema   Skin:     General: Skin is warm and dry.      Capillary Refill: Capillary refill takes less than 2 seconds.   Neurological:      General: No focal deficit present.      Mental Status: She is alert and oriented to person, place, and time. Mental status is at baseline.   Psychiatric:         Mood and Affect: Mood normal.         Behavior: Behavior normal.         Thought Content: Thought content normal.         Judgment: Judgment normal.                Assessment & Plan     1. Prediabetes     2. Muscle cramping     3. Chronic diastolic congestive heart failure (HCC)     4. Dyslipidemia     5. Essential hypertension     6. Hx of CABG     7. CAD - Coronary artery disease involving native coronary artery of native heart without angina pectoris     8. Pulmonary hypertension (HCC)     9. Stage 3a chronic kidney disease (HCC)         Medical Decision Making: Today's Assessment/Status/Plan:        1. CAD with prior CABG in '14  -no angina, mild THOMAS with sedentary lifestyle and COPD  -follow symptoms  -cont asa, statin  -no recent stress imaging, last echo was in '18    2. HTN  -improved control  -patient not agreeable to watch BP at home due to not wanting to take BP readings  -cont metoprolol 25 mg BID, lisinopril 40 mg QD, and lasix  20 mg QD  -watch BMP in 6 months, hydrate well  -follow with PCP, will be happy to help adjust medications if PCP wants our advice    3. DM type II with CKD  -follow labs with PCP    4. Muscle cramping and LE edema  -sounds more electrolyte imbalanced v. Arterial disease  -good pulses bilaterally  -recommend drink tonic water at night and report back to PCP if worsen  -leg edema most likely related to post CABG vein grafting venous insufficiency, encourage hydration and decrease Na intake in diet  -follow symptoms     5. HLD  -statin  -LDL goal <70 with CAD  -labs at goal, check annually    Patient is to follow up with Miranda JASON in 6 months with no labs or testing.

## 2022-05-26 ENCOUNTER — HOSPITAL ENCOUNTER (OUTPATIENT)
Dept: LAB | Facility: MEDICAL CENTER | Age: 82
End: 2022-05-26
Attending: INTERNAL MEDICINE
Payer: MEDICARE

## 2022-05-26 ENCOUNTER — OFFICE VISIT (OUTPATIENT)
Dept: MEDICAL GROUP | Facility: PHYSICIAN GROUP | Age: 82
End: 2022-05-26
Payer: MEDICARE

## 2022-05-26 VITALS
SYSTOLIC BLOOD PRESSURE: 134 MMHG | BODY MASS INDEX: 30.04 KG/M2 | OXYGEN SATURATION: 98 % | DIASTOLIC BLOOD PRESSURE: 66 MMHG | WEIGHT: 153 LBS | HEIGHT: 60 IN | HEART RATE: 56 BPM | TEMPERATURE: 97.9 F

## 2022-05-26 DIAGNOSIS — N17.9 AKI (ACUTE KIDNEY INJURY) (HCC): ICD-10-CM

## 2022-05-26 DIAGNOSIS — R73.03 PREDIABETES: ICD-10-CM

## 2022-05-26 DIAGNOSIS — I10 ESSENTIAL HYPERTENSION: ICD-10-CM

## 2022-05-26 DIAGNOSIS — Z86.39 HISTORY OF VITAMIN D DEFICIENCY: ICD-10-CM

## 2022-05-26 LAB
ALBUMIN SERPL BCP-MCNC: 4.3 G/DL (ref 3.2–4.9)
ANION GAP SERPL CALC-SCNC: 14 MMOL/L (ref 7–16)
APPEARANCE UR: ABNORMAL
BACTERIA #/AREA URNS HPF: ABNORMAL /HPF
BILIRUB UR QL STRIP.AUTO: NEGATIVE
BUN SERPL-MCNC: 34 MG/DL (ref 8–22)
CALCIUM SERPL-MCNC: 9.7 MG/DL (ref 8.5–10.5)
CHLORIDE SERPL-SCNC: 104 MMOL/L (ref 96–112)
CO2 SERPL-SCNC: 23 MMOL/L (ref 20–33)
COLOR UR: YELLOW
CREAT SERPL-MCNC: 1.45 MG/DL (ref 0.5–1.4)
CREAT UR-MCNC: 28.65 MG/DL
CREAT UR-MCNC: 29.81 MG/DL
EPI CELLS #/AREA URNS HPF: NEGATIVE /HPF
ERYTHROCYTE [DISTWIDTH] IN BLOOD BY AUTOMATED COUNT: 54.6 FL (ref 35.9–50)
GFR SERPLBLD CREATININE-BSD FMLA CKD-EPI: 36 ML/MIN/1.73 M 2
GLUCOSE SERPL-MCNC: 95 MG/DL (ref 65–99)
GLUCOSE UR STRIP.AUTO-MCNC: NEGATIVE MG/DL
HCT VFR BLD AUTO: 38.2 % (ref 37–47)
HGB BLD-MCNC: 12.5 G/DL (ref 12–16)
HYALINE CASTS #/AREA URNS LPF: ABNORMAL /LPF
KETONES UR STRIP.AUTO-MCNC: NEGATIVE MG/DL
LEUKOCYTE ESTERASE UR QL STRIP.AUTO: ABNORMAL
MCH RBC QN AUTO: 31.3 PG (ref 27–33)
MCHC RBC AUTO-ENTMCNC: 32.7 G/DL (ref 33.6–35)
MCV RBC AUTO: 95.5 FL (ref 81.4–97.8)
MICRO URNS: ABNORMAL
MICROALBUMIN UR-MCNC: <1.2 MG/DL
MICROALBUMIN/CREAT UR: NORMAL MG/G (ref 0–30)
NITRITE UR QL STRIP.AUTO: NEGATIVE
PH UR STRIP.AUTO: 6 [PH] (ref 5–8)
PHOSPHATE SERPL-MCNC: 3.9 MG/DL (ref 2.5–4.5)
PLATELET # BLD AUTO: 242 K/UL (ref 164–446)
PMV BLD AUTO: 11.3 FL (ref 9–12.9)
POTASSIUM SERPL-SCNC: 4.9 MMOL/L (ref 3.6–5.5)
PROT UR QL STRIP: NEGATIVE MG/DL
PROT UR-MCNC: <4 MG/DL (ref 0–15)
PROT/CREAT UR: NORMAL MG/G (ref 10–107)
PTH-INTACT SERPL-MCNC: 57.8 PG/ML (ref 14–72)
RBC # BLD AUTO: 4 M/UL (ref 4.2–5.4)
RBC # URNS HPF: ABNORMAL /HPF
RBC UR QL AUTO: ABNORMAL
SODIUM SERPL-SCNC: 141 MMOL/L (ref 135–145)
SP GR UR STRIP.AUTO: 1.01
UROBILINOGEN UR STRIP.AUTO-MCNC: 0.2 MG/DL
WBC # BLD AUTO: 9.3 K/UL (ref 4.8–10.8)
WBC #/AREA URNS HPF: ABNORMAL /HPF

## 2022-05-26 PROCEDURE — 82570 ASSAY OF URINE CREATININE: CPT

## 2022-05-26 PROCEDURE — 81001 URINALYSIS AUTO W/SCOPE: CPT

## 2022-05-26 PROCEDURE — 87077 CULTURE AEROBIC IDENTIFY: CPT

## 2022-05-26 PROCEDURE — 82043 UR ALBUMIN QUANTITATIVE: CPT

## 2022-05-26 PROCEDURE — 85027 COMPLETE CBC AUTOMATED: CPT

## 2022-05-26 PROCEDURE — 99213 OFFICE O/P EST LOW 20 MIN: CPT | Performed by: STUDENT IN AN ORGANIZED HEALTH CARE EDUCATION/TRAINING PROGRAM

## 2022-05-26 PROCEDURE — 84156 ASSAY OF PROTEIN URINE: CPT

## 2022-05-26 PROCEDURE — 87186 SC STD MICRODIL/AGAR DIL: CPT

## 2022-05-26 PROCEDURE — 87086 URINE CULTURE/COLONY COUNT: CPT

## 2022-05-26 PROCEDURE — 82306 VITAMIN D 25 HYDROXY: CPT | Mod: GA

## 2022-05-26 PROCEDURE — 36415 COLL VENOUS BLD VENIPUNCTURE: CPT | Mod: GA

## 2022-05-26 PROCEDURE — 80048 BASIC METABOLIC PNL TOTAL CA: CPT

## 2022-05-26 PROCEDURE — 83970 ASSAY OF PARATHORMONE: CPT

## 2022-05-26 PROCEDURE — 84100 ASSAY OF PHOSPHORUS: CPT

## 2022-05-26 PROCEDURE — 82040 ASSAY OF SERUM ALBUMIN: CPT

## 2022-05-26 ASSESSMENT — FIBROSIS 4 INDEX: FIB4 SCORE: 1.64

## 2022-05-26 NOTE — PATIENT INSTRUCTIONS
Please continue taking your current medications.   And, follow-up with your upcoming appointment,  including with your new provider.     Thank you.

## 2022-05-26 NOTE — ASSESSMENT & PLAN NOTE
Chronic and ongoing HTN.   ... in setting of CKD3.  ... with grade 2 diastolic dysfunction (with EF 65%),  ... prior CABG in 2014, with CAD and DLD.       She is being managed by Cardio and Nephro  ... they recently increased Lasix to every day...  Currently on      - Lisinopril 40 d,      - Lopressor 25 BID.     - Lasix 20 ... increased to full tab, every day   (increased on her last visit with Nephro)....  ... prior bloating and leg swelling on amlodipine...  Denies:  angina, palpitations, or dyspnea.    In office, BP - 134/66  - continue on same meds for now.   - continue to follow with Cardiology and Nephrology.   - keep New Provider visit in 3 weeks.

## 2022-05-26 NOTE — PROGRESS NOTES
Established Patient     Valorie Sierra is a 81 y.o. female.    Chief Complaint   Patient presents with   • Lab Results     Recent lab work review              Problems addressed this visit:     This note uses problem-based documentation.  Subjective HPI is included in Assessment & Plan, at bottom of note.   Problem   Ckd (Chronic Kidney Disease) Stage 3, Gfr 30-59 Ml/Min ([facility])   Essential Hypertension                           Past Medical History:   Diagnosis Date   • Anemia    • Anxiety    • Arrhythmia    • Arthritis    • Blood transfusion, without reported diagnosis    • CHF (congestive heart failure) ([facility])    • Chronic airway obstruction, not elsewhere classified    • Depression    • Emphysema    • GERD (gastroesophageal reflux disease)    • Headache, classical migraine    • Heart attack ([facility])    • Heart murmur    • Hyperlipidemia    • Hypertension    • IBD (inflammatory bowel disease)    • Kidney disease    • Ulcer    • Unspecified cataract        Patient Active Problem List   Diagnosis   • Chronic diastolic congestive heart failure ([facility])   • Dyslipidemia   • COPD (chronic obstructive pulmonary disease) ([facility])   • Essential hypertension   • Hx of CABG   • CAD - Coronary artery disease involving native coronary artery of native heart without angina pectoris   • Osteopenia   • Risk for falls   • Pulmonary hypertension ([facility])   • CKD (chronic kidney disease) stage 3, GFR 30-59 ml/min ([facility])   • Tonsil pain   • Healthcare maintenance   • Prediabetes   • Muscle cramping       Past Surgical History:   Procedure Laterality Date   • MULTIPLE CORONARY ARTERY BYPASS ENDO VEIN HARVEST  9/1/2014    Performed by Stephen Nowak M.D. at [facility]   • BLADDER NECK CONTRACTURE EXICISION  1970   • GANGLION EXCISION  1969    left wrist   • ABDOMINAL EXPLORATION     • ABDOMINAL HYSTERECTOMY TOTAL      fibroids   • APPENDECTOMY     • DENTAL EXTRACTION(S)         Family History   Problem Relation Age of Onset   •  Stroke Mother    • Hyperlipidemia Mother    • Hypertension Mother    • Lung Disease Mother         smoker   • Heart Disease Father    • Hypertension Father    • Hyperlipidemia Father    • Lung Disease Father         smoker   • Alcohol/Drug Father         etoh   • Other Father         aneurysm   • Kidney Disease Neg Hx        Social History     Tobacco Use   • Smoking status: Former Smoker     Packs/day: 3.00     Years: 55.00     Pack years: 165.00     Types: Cigarettes     Quit date: 2007     Years since quittin.7   • Smokeless tobacco: Never Used   • Tobacco comment: Quit   (Hx 3ppd x 55yr)...    Substance Use Topics   • Alcohol use: No     Alcohol/week: 0.0 oz       Current medications:  (including new changes):  Current Outpatient Medications   Medication Sig Dispense Refill   • lisinopril (PRINIVIL) 40 MG tablet TAKE 1 TABLET BY MOUTH EVERY DAY 90 Tablet 0   • WIXELA INHUB 250-50 MCG/DOSE AEROSOL POWDER, BREATH ACTIVATED INHALE 1 INHALATION EVERY 12 HOURS. 60 Each 2   • furosemide (LASIX) 20 MG Tab Take 1 Tablet by mouth every day. 90 Tablet 2   • atorvastatin (LIPITOR) 80 MG tablet Take 1 Tablet by mouth every evening. N THE EVENING (Patient taking differently: Take 80 mg by mouth every evening.) 90 Tablet 4   • metoprolol tartrate (LOPRESSOR) 25 MG Tab Take 1 Tablet by mouth 2 times a day. 180 Tablet 4   • omeprazole (PRILOSEC) 20 MG delayed-release capsule TAKE 1 CAPSULE BY MOUTH EVERY DAY 90 Capsule 1   • albuterol 108 (90 Base) MCG/ACT Aero Soln inhalation aerosol Inhale 2 Puffs every four hours as needed (Emergency Rescue use for - Shortness of Breath, or Wheezing.). 1 Each 3   • Probiotic Product (PROBIOTIC DAILY PO) Take  by mouth.     • Calcium Carbonate-Vit D-Min (CALCIUM 1200 PO) Take  by mouth.     • aspirin EC (ECOTRIN) 81 MG TBEC Take 1 Tab by mouth every day. 90 Tab 3     No current facility-administered medications for this visit.       Allergies as of 2022 - Reviewed  05/26/2022   Allergen Reaction Noted   • Iodine Itching 09/08/2014   • Oxycodone-acetaminophen Hives 09/08/2014   • Sulfa drugs Vomiting 09/08/2014   • Food  01/08/2020   • Nsaids  08/18/2020                   Review of Symptoms   (as noted elsewhere, and .....)   GEN/CONST:   Denies fever, chills,    CARDIO:   Denies chest pain, or edema.    RESP:   Denies shortness of breath, or coughing.  GI:    Denies nausea, vomiting, diarrhea,      PSYCH:  Denies anxiety, depression,           Physical Exam  /66 (BP Location: Right arm, Patient Position: Sitting, BP Cuff Size: Adult)   Pulse (!) 56   Temp 36.6 °C (97.9 °F) (Temporal)   Ht 1.524 m (5')   Wt 69.4 kg (153 lb)   SpO2 98%   BMI 29.88 kg/m²   General:  Alert and oriented, No apparent distress.    Lungs: Clear to auscultation bilaterally.  No wheezes or rales.  Cardiovascular:   No murmurs, or gallops.  ... Regular intervals, but a bit slow (~56).  Abdomen:  Soft.  Non-tender.  No rebound or guarding.   Extremities:  No leg edema.  Good general motion of extremities.   Psychological: Appears to have normal mood and affect.        Labs:  Recent / Last-Prior labs reviewed.    BMP and micro-Albumin                             Assessment & Plan  (with subjective history and orders):    Problem List Items Addressed This Visit     Essential hypertension (Chronic)     Chronic and ongoing HTN.   ... in setting of CKD3.  ... with grade 2 diastolic dysfunction (with EF 65%),  ... prior CABG in 2014, with CAD and DLD.       She is being managed by Cardio and Nephro  ... they recently increased Lasix to every day...  Currently on      - Lisinopril 40 d,      - Lopressor 25 BID.     - Lasix 20 ... increased to full tab, every day   (increased on her last visit with Nephro)....  ... prior bloating and leg swelling on amlodipine...  Denies:  angina, palpitations, or dyspnea.    In office, BP - 134/66  - continue on same meds for now.   - continue to follow with  Cardiology and Nephrology.   - keep New Provider visit in 3 weeks.                        Diagnosis Table, with orders:  1. Essential hypertension      - with lab review, with patient in office today.    - no changes to medications.    - she states she just had labs done (again),      ... for upcoming nephrology visit.    - New PCP can also review those labs, after resulted....                Follow-up:  Please keep your appointment with a new provider,           as I will be leaving the clinic, very soon.                A computerized dictation system may have been used in this note.    Despite review, there may be some errors in spelling or grammar.    Thompson Del Toro M.D.  5/26/2022

## 2022-05-29 LAB — 25(OH)D3 SERPL-MCNC: 26 NG/ML (ref 30–80)

## 2022-06-10 ENCOUNTER — OFFICE VISIT (OUTPATIENT)
Dept: NEPHROLOGY | Facility: MEDICAL CENTER | Age: 82
End: 2022-06-10
Payer: MEDICARE

## 2022-06-10 VITALS
SYSTOLIC BLOOD PRESSURE: 128 MMHG | DIASTOLIC BLOOD PRESSURE: 62 MMHG | WEIGHT: 155 LBS | HEART RATE: 68 BPM | HEIGHT: 60 IN | BODY MASS INDEX: 30.43 KG/M2 | OXYGEN SATURATION: 96 %

## 2022-06-10 DIAGNOSIS — M85.80 OSTEOPENIA, UNSPECIFIED LOCATION: ICD-10-CM

## 2022-06-10 DIAGNOSIS — E55.9 VITAMIN D DEFICIENCY: ICD-10-CM

## 2022-06-10 DIAGNOSIS — R82.81 PYURIA: ICD-10-CM

## 2022-06-10 DIAGNOSIS — I50.32 CHRONIC DIASTOLIC CONGESTIVE HEART FAILURE (HCC): ICD-10-CM

## 2022-06-10 DIAGNOSIS — I10 ESSENTIAL HYPERTENSION: Chronic | ICD-10-CM

## 2022-06-10 DIAGNOSIS — J43.1 PANLOBULAR EMPHYSEMA (HCC): ICD-10-CM

## 2022-06-10 DIAGNOSIS — N18.32 STAGE 3B CHRONIC KIDNEY DISEASE: ICD-10-CM

## 2022-06-10 PROCEDURE — 99214 OFFICE O/P EST MOD 30 MIN: CPT | Performed by: INTERNAL MEDICINE

## 2022-06-10 RX ORDER — ERGOCALCIFEROL 1.25 MG/1
50000 CAPSULE ORAL
Qty: 12 CAPSULE | Refills: 0 | Status: SHIPPED | OUTPATIENT
Start: 2022-06-10 | End: 2022-10-11

## 2022-06-10 ASSESSMENT — FIBROSIS 4 INDEX: FIB4 SCORE: 1.59

## 2022-06-10 ASSESSMENT — ENCOUNTER SYMPTOMS
ABDOMINAL PAIN: 0
SHORTNESS OF BREATH: 0
COUGH: 1
FEVER: 0

## 2022-06-10 NOTE — PROGRESS NOTES
Chief Complaint   Patient presents with   • Follow-Up     Last seen 02/08/2022   • Results     Labs 05/26/2022       CC: Follow-up CKD    HPI:  Valorie Sierra is a 81 y.o. female with HTN, pre-diabetes, CKD 3B, chronic diastolic heart failure who presents today for follow-up.    Re: HTN, diagnosed 2014 around her CABG surgery. BP control over the years has been well controlled. She doesn't check BP at home, and doesn't want to check it at home. BP controlled at doctor's visits.     Re: Chronic diastolic heart failure.  The patient had CABG surgery in September 2014. She takes lasix 20mg daily and feels diuretic effect. Denies LE edema currently, but sometimes her legs swell.     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 She had mild MELITA (Scr of ~2) when she had CABG in 2014, denies history of MELITA requiring dialysis. Denies NSAIDs. Denies bladder troubles.       Past Medical History:   Diagnosis Date   • Anemia    • Anxiety    • Arrhythmia    • Arthritis    • Blood transfusion, without reported diagnosis    • CHF (congestive heart failure) (HCC)    • Chronic airway obstruction, not elsewhere classified    • Depression    • Emphysema    • GERD (gastroesophageal reflux disease)    • Headache, classical migraine    • Heart attack (HCC)    • Heart murmur    • Hyperlipidemia    • Hypertension    • IBD (inflammatory bowel disease)    • Kidney disease    • Ulcer    • Unspecified cataract        Past Surgical History:   Procedure Laterality Date   • MULTIPLE CORONARY ARTERY BYPASS ENDO VEIN HARVEST  9/1/2014    Performed by Stephen Nowak M.D. at SURGERY [facility]S   • BLADDER NECK CONTRACTURE EXICISION  1970   • GANGLION EXCISION  1969    left wrist   • ABDOMINAL EXPLORATION     • ABDOMINAL HYSTERECTOMY TOTAL      fibroids   • APPENDECTOMY     • DENTAL EXTRACTION(S)          Outpatient Encounter Medications as of 6/10/2022   Medication Sig Dispense Refill   • lisinopril (PRINIVIL) 40 MG tablet TAKE 1 TABLET BY MOUTH EVERY DAY 90 Tablet  0   • WIXELA INHUB 250-50 MCG/DOSE AEROSOL POWDER, BREATH ACTIVATED INHALE 1 INHALATION EVERY 12 HOURS. 60 Each 2   • furosemide (LASIX) 20 MG Tab Take 1 Tablet by mouth every day. 90 Tablet 2   • atorvastatin (LIPITOR) 80 MG tablet Take 1 Tablet by mouth every evening. N THE EVENING (Patient taking differently: Take 80 mg by mouth every evening.) 90 Tablet 4   • metoprolol tartrate (LOPRESSOR) 25 MG Tab Take 1 Tablet by mouth 2 times a day. 180 Tablet 4   • omeprazole (PRILOSEC) 20 MG delayed-release capsule TAKE 1 CAPSULE BY MOUTH EVERY DAY 90 Capsule 1   • albuterol 108 (90 Base) MCG/ACT Aero Soln inhalation aerosol Inhale 2 Puffs every four hours as needed (Emergency Rescue use for - Shortness of Breath, or Wheezing.). 1 Each 3   • Probiotic Product (PROBIOTIC DAILY PO) Take  by mouth.     • Calcium Carbonate-Vit D-Min (CALCIUM 1200 PO) Take  by mouth.     • aspirin EC (ECOTRIN) 81 MG TBEC Take 1 Tab by mouth every day. 90 Tab 3     No facility-administered encounter medications on file as of 6/10/2022.        Allergies   Allergen Reactions   • Iodine Itching   • Oxycodone-Acetaminophen Hives   • Sulfa Drugs Vomiting   • Food      avocado   • Nsaids      Pt states she was told to never take these because of her kidneys.             Review of Systems   Constitutional: Negative for fever.   Respiratory: Positive for cough. Negative for shortness of breath.    Cardiovascular: Negative for chest pain.   Gastrointestinal: Negative for abdominal pain.   Genitourinary: Negative for dysuria.   All other systems reviewed and are negative.      /62 (BP Location: Left arm, Patient Position: Sitting, BP Cuff Size: Adult)   Pulse 68   Ht 1.524 m (5')   Wt 70.3 kg (155 lb)   SpO2 96%   BMI 30.27 kg/m²     Physical Exam  Constitutional:       General: She is not in acute distress.  HENT:      Mouth/Throat:      Pharynx: No oropharyngeal exudate.   Eyes:      General: No scleral icterus.  Neck:      Trachea: No  tracheal deviation.   Cardiovascular:      Rate and Rhythm: Normal rate and regular rhythm.      Heart sounds: Normal heart sounds. No murmur heard.  Pulmonary:      Effort: Pulmonary effort is normal.      Breath sounds: Normal breath sounds. No stridor. No rales.   Abdominal:      General: Bowel sounds are normal.      Palpations: Abdomen is soft.      Tenderness: There is no abdominal tenderness.   Musculoskeletal:         General: Normal range of motion.      Cervical back: Neck supple.      Right lower leg: Edema (1+) present.      Left lower leg: Edema (1+) present.   Skin:     General: Skin is warm and dry.      Findings: No rash.   Neurological:      General: No focal deficit present.      Mental Status: She is alert and oriented to person, place, and time.   Psychiatric:         Mood and Affect: Mood and affect normal.         Behavior: Behavior normal.           Labs reviewed.    Recent Labs     12/01/21  0821 01/14/22  0933 02/15/22  1130 05/05/22  0930 05/26/22  1222   ALBUMIN 4.8  --  4.4  --  4.3   SODIUM 140   < > 138 138 141   POTASSIUM 4.9   < > 4.7 4.7 4.9   CHLORIDE 104   < > 100 100 104   CO2 28   < > 26 27 23   BUN 27*   < > 28* 32* 34*   CREATININE 1.17   < > 1.16 1.24 1.45*   PHOSPHORUS  --   --   --   --  3.9    < > = values in this interval not displayed.       Lab Results   Component Value Date/Time    WBC 9.3 05/26/2022 12:22 PM    RBC 4.00 (L) 05/26/2022 12:22 PM    HEMOGLOBIN 12.5 05/26/2022 12:22 PM    HEMATOCRIT 38.2 05/26/2022 12:22 PM    MCV 95.5 05/26/2022 12:22 PM    MCH 31.3 05/26/2022 12:22 PM    MCHC 32.7 (L) 05/26/2022 12:22 PM    MPV 11.3 05/26/2022 12:22 PM             URINALYSIS:  Lab Results   Component Value Date/Time    COLORURINE Yellow 05/26/2022 1223    CLARITY Cloudy (A) 05/26/2022 1223    SPECGRAVITY 1.010 05/26/2022 1223    PHURINE 6.0 05/26/2022 1223    KETONES Negative 05/26/2022 1223    PROTEINURIN Negative 05/26/2022 1223    BILIRUBINUR Negative 05/26/2022 1223     UROBILU 0.2 05/26/2022 1223    NITRITE Negative 05/26/2022 1223    LEUKESTERAS Large (A) 05/26/2022 1223    OCCULTBLOOD Trace (A) 05/26/2022 1223       [facility] [place]  Lab Results   Component Value Date/Time    TOTPROTUR <4.0 05/26/2022 1223      Lab Results   Component Value Date/Time    CREATININEU 29.81 05/26/2022 1223         Imaging reviewed  No orders to display         Assessment:  Valorie Sierra is a 81 y.o. female with HTN, pre-diabetes, CKD 3B, chronic diastolic heart failure who presents today for follow-up.    Plan:  1.  CKD stage IIIb  -CKD labs slightly worse, likely due to unmasking of CKD now that her volume status is better.  Underlying CKD likely from hypertension and age-related kidney decline.  Avoid NSAIDs and other nephrotoxins.  Low-salt diet.  I explained the importance of blood pressure and glycemic control to help slow CKD progression.     2. Prediabetes  -I explained the importance of glycemic control to help slow CKD progression.  Patient's prediabetes is currently diet controlled.    3. Essential hypertension  -Controlled.  Continue lisinopril at maximally tolerated dose.    4. Chronic diastolic congestive heart failure (HCC)  -Volume status improved, but remains with some lower extremity edema.  Recommend continue Lasix 20 mg p.o. daily, with option to increase to 40 mg p.o. twice daily for 2 days if hypertension, orthopnea, or lower extremity edema worsen.  Continue ACE inhibitor and beta-blocker.  Defer further management to primary care and/or cardiology.    5.  Vitamin D deficiency  - Prescribe ergocalciferol 50,000 units by mouth weekly for 12 weeks.  Upon completion, resume daily over-the-counter vitamin D supplement.    Return to clinic 4 months with preclinic labs    Vitaliy Simon MD  Nephrology

## 2022-06-10 NOTE — PATIENT INSTRUCTIONS
"If needed, \"double for two.\" If your breathing is worse when lying flat, or if the leg swelling gets very bad, increase lasix to 40mg twice a day for two days.    "

## 2022-06-14 ENCOUNTER — OFFICE VISIT (OUTPATIENT)
Dept: MEDICAL GROUP | Facility: PHYSICIAN GROUP | Age: 82
End: 2022-06-14
Payer: MEDICARE

## 2022-06-14 VITALS
BODY MASS INDEX: 31.22 KG/M2 | HEART RATE: 89 BPM | TEMPERATURE: 97.1 F | HEIGHT: 60 IN | OXYGEN SATURATION: 95 % | SYSTOLIC BLOOD PRESSURE: 128 MMHG | DIASTOLIC BLOOD PRESSURE: 68 MMHG | WEIGHT: 159 LBS

## 2022-06-14 DIAGNOSIS — I10 ESSENTIAL HYPERTENSION: Chronic | ICD-10-CM

## 2022-06-14 DIAGNOSIS — R73.03 PREDIABETES: ICD-10-CM

## 2022-06-14 DIAGNOSIS — I50.32 CHRONIC DIASTOLIC CONGESTIVE HEART FAILURE (HCC): ICD-10-CM

## 2022-06-14 DIAGNOSIS — E78.5 DYSLIPIDEMIA: ICD-10-CM

## 2022-06-14 DIAGNOSIS — N30.00 ACUTE CYSTITIS WITHOUT HEMATURIA: ICD-10-CM

## 2022-06-14 DIAGNOSIS — J43.1 PANLOBULAR EMPHYSEMA (HCC): ICD-10-CM

## 2022-06-14 LAB
HBA1C MFR BLD: 5.9 % (ref 0–5.6)
INT CON NEG: ABNORMAL
INT CON POS: ABNORMAL

## 2022-06-14 PROCEDURE — 99214 OFFICE O/P EST MOD 30 MIN: CPT | Performed by: NURSE PRACTITIONER

## 2022-06-14 PROCEDURE — 83036 HEMOGLOBIN GLYCOSYLATED A1C: CPT | Performed by: NURSE PRACTITIONER

## 2022-06-14 RX ORDER — ATORVASTATIN CALCIUM 80 MG/1
80 TABLET, FILM COATED ORAL EVERY EVENING
Qty: 90 TABLET | Refills: 3 | Status: SHIPPED | OUTPATIENT
Start: 2022-06-14 | End: 2023-07-25

## 2022-06-14 RX ORDER — LISINOPRIL 40 MG/1
40 TABLET ORAL DAILY
Qty: 90 TABLET | Refills: 3 | Status: SHIPPED | OUTPATIENT
Start: 2022-06-14 | End: 2023-07-25

## 2022-06-14 RX ORDER — CIPROFLOXACIN 500 MG/1
500 TABLET, FILM COATED ORAL 2 TIMES DAILY
Qty: 6 TABLET | Refills: 0 | Status: SHIPPED | OUTPATIENT
Start: 2022-06-14 | End: 2022-06-17

## 2022-06-14 ASSESSMENT — ENCOUNTER SYMPTOMS
PSYCHIATRIC NEGATIVE: 1
NEUROLOGICAL NEGATIVE: 1
PALPITATIONS: 0
FEVER: 0
GASTROINTESTINAL NEGATIVE: 1
SPUTUM PRODUCTION: 0
CONSTITUTIONAL NEGATIVE: 1
COUGH: 0
EYES NEGATIVE: 1
SHORTNESS OF BREATH: 0
MUSCULOSKELETAL NEGATIVE: 1

## 2022-06-14 ASSESSMENT — FIBROSIS 4 INDEX: FIB4 SCORE: 1.59

## 2022-06-14 NOTE — ASSESSMENT & PLAN NOTE
BP in clinic today 128/68; no CP, palpitations, dizziness reported today. No edema noted in LE. Continue management per cardiology  - Lisinopril 40mg QD, Lopressor 25mg BID  - Also on Lasix 20mg QD for HF

## 2022-06-14 NOTE — PROGRESS NOTES
Subjective:     CC:    Chief Complaint   Patient presents with   • Establish Care   • Hypertension        HISTORY OF THE PRESENT ILLNESS: Patient is a 81 y.o. female, here today to establish care. Prior PCP was Dr boss. The below problems were discussed/reviewed at this visit:    Problem   Acute Cystitis Without Hematuria    Cloudy urine with mod bacteria & large leuk on UA 5/26/2022; culture grew klebsiella; States she did not have symptoms at the time. She started having burning with urination this morning and mild flank pain  - allergic to sulfa, low sensitivity with Macrobid; will start her on Cipro x3 days only     Prediabetes     Latest Reference Range & Units 05/28/21 08:10 12/01/21 08:21 06/14/22 12:31   Glycohemoglobin 0.0 - 5.6 % 6.5 (H) 6.1 (H) 5.9 !     - diet controlled     Stage 3b Chronic Kidney Disease (Hcc)    GFR 36  Followed by nephrology  BP managed on Lisinopril 40mg QD;  HF managed on Metoprolol 25mg BID, Lasix 20mg QD  Prediabetes is diet controlled & improving     CAD - Coronary artery disease involving native coronary artery of native heart without angina pectoris    Chronic, stable s/p CABG 2014  Followed by cardiology; On daily Atorvastatin 80mg & ASA   BP managed on Lisinopril 40mg QD  HF managed on Metoprolol 25mg BID, Lasix 20mg QD      Dyslipidemia    Followed by Cardiology; Hx CAD s/p CABG 2014  Taking Atorvastatin 80mg QD, daily ASA     Copd (Chronic Obstructive Pulmonary Disease) (Hcc)    Switched from Symbicort to Advair last year 2021 due to insurance coverage/cost; states doing well, oxygen level at 96-98% on room air.     Essential Hypertension    Followed by cardiology; Also with hx CABG 2014, HF  Followed by nephrology for CKD3  Taking Lisinopril 40mg QD, Lopressor 25mg BID     Chronic Diastolic Congestive Heart Failure (Hcc)    Followed by Cardiology; Also with Hx CABG 2014, HTN  Last echo done in 2018- EF 60%.  Taking Metoprolol 25mg QD, Lasix 20mg QD        Patient  Active Problem List   Diagnosis   • Chronic diastolic congestive heart failure (HCC)   • Dyslipidemia   • COPD (chronic obstructive pulmonary disease) ([facility])   • Essential hypertension   • Hx of CABG   • CAD - Coronary artery disease involving native coronary artery of native heart without angina pectoris   • Osteopenia   • Risk for falls   • Pulmonary hypertension (HCC)   • Stage 3b chronic kidney disease (HCC)   • Tonsil pain   • Healthcare maintenance   • Prediabetes   • Muscle cramping   • Acute cystitis without hematuria       Past Medical History:   Diagnosis Date   • Anemia    • Anxiety    • Arrhythmia    • Arthritis    • Blood transfusion, without reported diagnosis    • CHF (congestive heart failure) ([facility])    • Chronic airway obstruction, not elsewhere classified    • Depression    • Emphysema    • GERD (gastroesophageal reflux disease)    • Headache, classical migraine    • Heart attack ([facility])    • Heart murmur    • Hyperlipidemia    • Hypertension    • IBD (inflammatory bowel disease)    • Kidney disease    • Ulcer    • Unspecified cataract         Current Outpatient Medications Ordered in Epic   Medication Sig Dispense Refill   • ciprofloxacin (CIPRO) 500 MG Tab Take 1 Tablet by mouth 2 times a day for 3 days. 6 Tablet 0   • metoprolol tartrate (LOPRESSOR) 25 MG Tab Take 1 Tablet by mouth 2 times a day. 180 Tablet 3   • lisinopril (PRINIVIL) 40 MG tablet Take 1 Tablet by mouth every day. 90 Tablet 3   • atorvastatin (LIPITOR) 80 MG tablet Take 1 Tablet by mouth every evening. 90 Tablet 3   • vitamin D2, Ergocalciferol, (DRISDOL) 1.25 MG (43458 UT) Cap capsule Take 1 Capsule by mouth every 7 days. 12 Capsule 0   • WIXELA INHUB 250-50 MCG/DOSE AEROSOL POWDER, BREATH ACTIVATED INHALE 1 INHALATION EVERY 12 HOURS. 60 Each 2   • furosemide (LASIX) 20 MG Tab Take 1 Tablet by mouth every day. 90 Tablet 2   • omeprazole (PRILOSEC) 20 MG delayed-release capsule TAKE 1 CAPSULE BY MOUTH EVERY DAY 90 Capsule 1   •  albuterol 108 (90 Base) MCG/ACT Aero Soln inhalation aerosol Inhale 2 Puffs every four hours as needed (Emergency Rescue use for - Shortness of Breath, or Wheezing.). 1 Each 3   • Probiotic Product (PROBIOTIC DAILY PO) Take  by mouth.     • aspirin EC (ECOTRIN) 81 MG TBEC Take 1 Tab by mouth every day. 90 Tab 3     No current Epic-ordered facility-administered medications on file.        Past Surgical History:   Procedure Laterality Date   • MULTIPLE CORONARY ARTERY BYPASS ENDO VEIN HARVEST  9/1/2014    Performed by Stephen Nowak M.D. at SURGERY [facility] ORS   • BLADDER NECK CONTRACTURE EXICISION  1970   • GANGLION EXCISION  1969    left wrist   • ABDOMINAL EXPLORATION     • ABDOMINAL HYSTERECTOMY TOTAL      fibroids   • APPENDECTOMY     • DENTAL EXTRACTION(S)          Allergies:  Iodine, Oxycodone-acetaminophen, Sulfa drugs, Food, and Nsaids    ROS:   Review of Systems   Constitutional: Negative.  Negative for fever and malaise/fatigue.   HENT: Negative.    Eyes: Negative.    Respiratory: Negative for cough, sputum production and shortness of breath.    Cardiovascular: Negative for chest pain, palpitations and leg swelling.   Gastrointestinal: Negative.    Genitourinary: Negative.    Musculoskeletal: Negative.         Ambulates with cane   Neurological: Negative.    Endo/Heme/Allergies: Negative.    Psychiatric/Behavioral: Negative.        Objective:     Exam: /68 (BP Location: Left arm, Patient Position: Sitting, BP Cuff Size: Large adult)   Pulse 89   Temp 36.2 °C (97.1 °F) (Temporal)   Ht 1.524 m (5')   Wt 72.1 kg (159 lb)   SpO2 95%  Body mass index is 31.05 kg/m².    Physical Exam  Constitutional:       Appearance: Normal appearance.   Cardiovascular:      Rate and Rhythm: Normal rate and regular rhythm.      Pulses: Normal pulses.      Heart sounds: Normal heart sounds.   Pulmonary:      Effort: Pulmonary effort is normal.      Breath sounds: Normal breath sounds.   Musculoskeletal:          General: Normal range of motion.      Cervical back: Normal range of motion and neck supple.   Skin:     General: Skin is warm and dry.   Neurological:      General: No focal deficit present.      Mental Status: She is alert and oriented to person, place, and time.   Psychiatric:         Mood and Affect: Mood normal.         Behavior: Behavior normal.         Thought Content: Thought content normal.         Judgment: Judgment normal.       Assessment & Plan:   81 y.o. female with the following -    Problem List Items Addressed This Visit     Essential hypertension (Chronic)     BP in clinic today 128/68; no CP, palpitations, dizziness reported today. No edema noted in LE. Continue management per cardiology  - Lisinopril 40mg QD, Lopressor 25mg BID  - Also on Lasix 20mg QD for HF           Relevant Medications    metoprolol tartrate (LOPRESSOR) 25 MG Tab    lisinopril (PRINIVIL) 40 MG tablet    atorvastatin (LIPITOR) 80 MG tablet    Chronic diastolic congestive heart failure (HCC)     No exacerbation reported today; she ambulates with cane at baseline.  Continue plan per cardiology  - Metoprolol 25mg QD, Lasix 20mg QD  - monitor annual CMP           Relevant Medications    metoprolol tartrate (LOPRESSOR) 25 MG Tab    lisinopril (PRINIVIL) 40 MG tablet    atorvastatin (LIPITOR) 80 MG tablet    Dyslipidemia     No myalgia; LDL <100; continue plan per cardiology  - Atorvastatin 80mg QD, daily ASA  - monitor annual fasting lipid           Relevant Medications    atorvastatin (LIPITOR) 80 MG tablet    COPD (chronic obstructive pulmonary disease) (HCC)     Symptoms well managed on current inhaler  - Continue Wixela 250/50 1puff BID;   - prn albuterol for exacerbation, SOB, cough  - Last PFTs in 2017, consistent with COPD, no worsening              Prediabetes     A1C today in clinic 5.9 (down from 6.1)  - continue with low sugar, reduced carb intake  - monitor A1C Q 6 months           Relevant Orders    POCT  A1C  (Completed)    Acute cystitis without hematuria    Relevant Medications    ciprofloxacin (CIPRO) 500 MG Tab        Medications Prescribed Today:  1. Acute cystitis without hematuria  - ciprofloxacin (CIPRO) 500 MG Tab; Take 1 Tablet by mouth 2 times a day for 3 days.  Dispense: 6 Tablet; Refill: 0    4. Essential hypertension  - metoprolol tartrate (LOPRESSOR) 25 MG Tab; Take 1 Tablet by mouth 2 times a day.  Dispense: 180 Tablet; Refill: 3  - lisinopril (PRINIVIL) 40 MG tablet; Take 1 Tablet by mouth every day.  Dispense: 90 Tablet; Refill: 3    6. Dyslipidemia  - atorvastatin (LIPITOR) 80 MG tablet; Take 1 Tablet by mouth every evening.  Dispense: 90 Tablet; Refill: 3    Educated in proper administration of medication(s) ordered today including safety, possible SE, risks, benefits, rationale and alternatives to therapy.     Return in about 6 months (around 12/14/2022) for A1C check.    Please note that this dictation was created using voice recognition software. I have made every reasonable attempt to correct obvious errors, but I expect that there are errors of grammar and possibly content that I did not discover before finalizing the note.

## 2022-06-14 NOTE — ASSESSMENT & PLAN NOTE
A1C today in clinic 5.9 (down from 6.1)  - continue with low sugar, reduced carb intake  - monitor A1C Q 6 months

## 2022-06-14 NOTE — ASSESSMENT & PLAN NOTE
No exacerbation reported today; she ambulates with cane at baseline.  Continue plan per cardiology  - Metoprolol 25mg QD, Lasix 20mg QD  - monitor annual CMP

## 2022-06-14 NOTE — ASSESSMENT & PLAN NOTE
Symptoms well managed on current inhaler  - Continue Wixela 250/50 1puff BID;   - prn albuterol for exacerbation, SOB, cough  - Last PFTs in 2017, consistent with COPD, no worsening

## 2022-07-13 ENCOUNTER — TELEPHONE (OUTPATIENT)
Dept: MEDICAL GROUP | Facility: PHYSICIAN GROUP | Age: 82
End: 2022-07-13
Payer: MEDICARE

## 2022-07-13 DIAGNOSIS — J43.1 PANLOBULAR EMPHYSEMA (HCC): ICD-10-CM

## 2022-07-13 RX ORDER — FLUTICASONE PROPIONATE AND SALMETEROL 250; 50 UG/1; UG/1
1 POWDER RESPIRATORY (INHALATION) EVERY 12 HOURS
Qty: 1 EACH | Refills: 0 | Status: SHIPPED | OUTPATIENT
Start: 2022-07-13 | End: 2022-08-09 | Stop reason: SDUPTHER

## 2022-07-13 NOTE — TELEPHONE ENCOUNTER
Received request via: Pharmacy    Was the patient seen in the last year in this department? Yes    Does the patient have an active prescription (recently filled or refills available) for medication(s) requested? No     Was unable to pend Wixela request, it said no longer available

## 2022-07-13 NOTE — TELEPHONE ENCOUNTER
Pt needs Wixela refill but I cannot pend it in chart. I am not sure why .           Received request via: Patient    Was the patient seen in the last year in this department? Yes    Does the patient have an active prescription (recently filled or refills available) for medication(s) requested? No

## 2022-07-14 NOTE — PROGRESS NOTES
Requested Prescriptions     Signed Prescriptions Disp Refills   • fluticasone-salmeterol (WIXELA INHUB) 250-50 MCG/ACT AEROSOL POWDER, BREATH ACTIVATED 1 Each 0     Sig: Inhale 1 Puff every 12 hours.       LUCIE Dickson.

## 2022-08-09 DIAGNOSIS — J43.1 PANLOBULAR EMPHYSEMA (HCC): ICD-10-CM

## 2022-08-09 RX ORDER — FLUTICASONE PROPIONATE AND SALMETEROL 250; 50 UG/1; UG/1
1 POWDER RESPIRATORY (INHALATION) EVERY 12 HOURS
Qty: 60 EACH | Refills: 1 | Status: SHIPPED | OUTPATIENT
Start: 2022-08-09 | End: 2022-10-11 | Stop reason: SDUPTHER

## 2022-09-30 ENCOUNTER — HOSPITAL ENCOUNTER (OUTPATIENT)
Dept: LAB | Facility: MEDICAL CENTER | Age: 82
End: 2022-09-30
Attending: INTERNAL MEDICINE
Payer: MEDICARE

## 2022-09-30 DIAGNOSIS — N18.32 STAGE 3B CHRONIC KIDNEY DISEASE: ICD-10-CM

## 2022-09-30 DIAGNOSIS — I10 ESSENTIAL HYPERTENSION: Chronic | ICD-10-CM

## 2022-09-30 DIAGNOSIS — E55.9 VITAMIN D DEFICIENCY: ICD-10-CM

## 2022-09-30 DIAGNOSIS — R82.81 PYURIA: ICD-10-CM

## 2022-09-30 LAB
25(OH)D3 SERPL-MCNC: 42 NG/ML (ref 30–100)
ALBUMIN SERPL BCP-MCNC: 4.1 G/DL (ref 3.2–4.9)
ANION GAP SERPL CALC-SCNC: 9 MMOL/L (ref 7–16)
BUN SERPL-MCNC: 34 MG/DL (ref 8–22)
CALCIUM SERPL-MCNC: 9.3 MG/DL (ref 8.5–10.5)
CHLORIDE SERPL-SCNC: 109 MMOL/L (ref 96–112)
CO2 SERPL-SCNC: 25 MMOL/L (ref 20–33)
CREAT SERPL-MCNC: 1.33 MG/DL (ref 0.5–1.4)
ERYTHROCYTE [DISTWIDTH] IN BLOOD BY AUTOMATED COUNT: 53.5 FL (ref 35.9–50)
GFR SERPLBLD CREATININE-BSD FMLA CKD-EPI: 40 ML/MIN/1.73 M 2
GLUCOSE SERPL-MCNC: 89 MG/DL (ref 65–99)
HCT VFR BLD AUTO: 37.8 % (ref 37–47)
HGB BLD-MCNC: 12.2 G/DL (ref 12–16)
MCH RBC QN AUTO: 31.1 PG (ref 27–33)
MCHC RBC AUTO-ENTMCNC: 32.3 G/DL (ref 33.6–35)
MCV RBC AUTO: 96.4 FL (ref 81.4–97.8)
PHOSPHATE SERPL-MCNC: 3.9 MG/DL (ref 2.5–4.5)
PLATELET # BLD AUTO: 261 K/UL (ref 164–446)
PMV BLD AUTO: 10.7 FL (ref 9–12.9)
POTASSIUM SERPL-SCNC: 5.6 MMOL/L (ref 3.6–5.5)
PTH-INTACT SERPL-MCNC: 132 PG/ML (ref 14–72)
RBC # BLD AUTO: 3.92 M/UL (ref 4.2–5.4)
SODIUM SERPL-SCNC: 143 MMOL/L (ref 135–145)
WBC # BLD AUTO: 8 K/UL (ref 4.8–10.8)

## 2022-09-30 PROCEDURE — 82306 VITAMIN D 25 HYDROXY: CPT

## 2022-09-30 PROCEDURE — 85027 COMPLETE CBC AUTOMATED: CPT

## 2022-09-30 PROCEDURE — 82570 ASSAY OF URINE CREATININE: CPT

## 2022-09-30 PROCEDURE — 84156 ASSAY OF PROTEIN URINE: CPT

## 2022-09-30 PROCEDURE — 81001 URINALYSIS AUTO W/SCOPE: CPT

## 2022-09-30 PROCEDURE — 84100 ASSAY OF PHOSPHORUS: CPT

## 2022-09-30 PROCEDURE — 82043 UR ALBUMIN QUANTITATIVE: CPT

## 2022-09-30 PROCEDURE — 80048 BASIC METABOLIC PNL TOTAL CA: CPT

## 2022-09-30 PROCEDURE — 83970 ASSAY OF PARATHORMONE: CPT

## 2022-09-30 PROCEDURE — 82040 ASSAY OF SERUM ALBUMIN: CPT

## 2022-09-30 PROCEDURE — 36415 COLL VENOUS BLD VENIPUNCTURE: CPT

## 2022-10-01 LAB
APPEARANCE UR: CLEAR
BACTERIA #/AREA URNS HPF: NEGATIVE /HPF
BILIRUB UR QL STRIP.AUTO: NEGATIVE
COLOR UR: YELLOW
CREAT UR-MCNC: 48.48 MG/DL
CREAT UR-MCNC: 48.88 MG/DL
EPI CELLS #/AREA URNS HPF: NORMAL /HPF
GLUCOSE UR STRIP.AUTO-MCNC: NEGATIVE MG/DL
KETONES UR STRIP.AUTO-MCNC: NEGATIVE MG/DL
LEUKOCYTE ESTERASE UR QL STRIP.AUTO: ABNORMAL
MICRO URNS: ABNORMAL
MICROALBUMIN UR-MCNC: <1.2 MG/DL
MICROALBUMIN/CREAT UR: NORMAL MG/G (ref 0–30)
NITRITE UR QL STRIP.AUTO: NEGATIVE
PH UR STRIP.AUTO: 6 [PH] (ref 5–8)
PROT UR QL STRIP: NEGATIVE MG/DL
PROT UR-MCNC: 4 MG/DL (ref 0–15)
PROT/CREAT UR: 82 MG/G (ref 10–107)
RBC # URNS HPF: NORMAL /HPF
RBC UR QL AUTO: NEGATIVE
SP GR UR STRIP.AUTO: 1.01
UROBILINOGEN UR STRIP.AUTO-MCNC: 0.2 MG/DL
WBC #/AREA URNS HPF: NORMAL /HPF

## 2022-10-11 ENCOUNTER — OFFICE VISIT (OUTPATIENT)
Dept: NEPHROLOGY | Facility: MEDICAL CENTER | Age: 82
End: 2022-10-11
Payer: MEDICARE

## 2022-10-11 VITALS
WEIGHT: 162.5 LBS | HEIGHT: 60 IN | BODY MASS INDEX: 31.9 KG/M2 | SYSTOLIC BLOOD PRESSURE: 112 MMHG | TEMPERATURE: 96.7 F | DIASTOLIC BLOOD PRESSURE: 68 MMHG | HEART RATE: 63 BPM | OXYGEN SATURATION: 97 %

## 2022-10-11 DIAGNOSIS — I10 ESSENTIAL HYPERTENSION: Chronic | ICD-10-CM

## 2022-10-11 DIAGNOSIS — I50.32 CHRONIC DIASTOLIC CONGESTIVE HEART FAILURE (HCC): ICD-10-CM

## 2022-10-11 DIAGNOSIS — E21.3 HYPERPARATHYROIDISM (HCC): ICD-10-CM

## 2022-10-11 DIAGNOSIS — N18.32 STAGE 3B CHRONIC KIDNEY DISEASE: ICD-10-CM

## 2022-10-11 DIAGNOSIS — J43.1 PANLOBULAR EMPHYSEMA (HCC): ICD-10-CM

## 2022-10-11 PROCEDURE — 99214 OFFICE O/P EST MOD 30 MIN: CPT | Performed by: INTERNAL MEDICINE

## 2022-10-11 RX ORDER — FLUTICASONE PROPIONATE AND SALMETEROL 250; 50 UG/1; UG/1
1 POWDER RESPIRATORY (INHALATION) EVERY 12 HOURS
Qty: 60 EACH | Refills: 0 | Status: SHIPPED | OUTPATIENT
Start: 2022-10-11 | End: 2022-11-10 | Stop reason: SDUPTHER

## 2022-10-11 ASSESSMENT — ENCOUNTER SYMPTOMS
FEVER: 0
ABDOMINAL PAIN: 0
SHORTNESS OF BREATH: 0
NAUSEA: 1

## 2022-10-11 ASSESSMENT — FIBROSIS 4 INDEX: FIB4 SCORE: 1.49

## 2022-10-11 NOTE — TELEPHONE ENCOUNTER
Received request via: Patient    Was the patient seen in the last year in this department? Yes    Does the patient have an active prescription (recently filled or refills available) for medication(s) requested? No      PATIENT CAME INTO THE OFFICE STATING SHE NEEDS A REFILL ASAP FOR THIS.

## 2022-10-11 NOTE — PROGRESS NOTES
Chief Complaint   Patient presents with    Follow-Up    Chronic Kidney Disease       CC: Follow-up CKD    HPI:  Valorie Sierra is a 82 y.o. female with HTN, pre-diabetes, CKD 3B, chronic diastolic heart failure who presents today for follow-up.    Re: HTN, diagnosed 2014 around her CABG surgery. BP control over the years has been well controlled. She doesn't check BP at home, BP ok at doctor's office.     Re: Chronic diastolic heart failure.  The patient had CABG surgery in September 2014. She takes lasix 20mg daily and she feels diuretic effect. Complains of LE edema currently.     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 She had mild MELITA (Scr of ~2) when she had CABG in 2014, denies history of MELITA requiring dialysis. Denies NSAIDs. Denies bladder troubles.       Past Medical History:   Diagnosis Date    Anemia     Anxiety     Arrhythmia     Arthritis     Blood transfusion, without reported diagnosis     CHF (congestive heart failure) (HCC)     Chronic airway obstruction, not elsewhere classified     Depression     Emphysema     GERD (gastroesophageal reflux disease)     Headache, classical migraine     Heart attack (HCC)     Heart murmur     Hyperlipidemia     Hypertension     IBD (inflammatory bowel disease)     Kidney disease     Ulcer     Unspecified cataract        Past Surgical History:   Procedure Laterality Date    MULTIPLE CORONARY ARTERY BYPASS ENDO VEIN HARVEST  9/1/2014    Performed by Stephen Nowak M.D. at SURGERY [facility] ORS    BLADDER NECK CONTRACTURE EXICISION  1970    GANGLION EXCISION  1969    left wrist    ABDOMINAL EXPLORATION      ABDOMINAL HYSTERECTOMY TOTAL      fibroids    APPENDECTOMY      DENTAL EXTRACTION(S)          Outpatient Encounter Medications as of 10/11/2022   Medication Sig Dispense Refill    Cholecalciferol 2000 UNIT Cap Take 2,000 Units by mouth every day.      fluticasone-salmeterol (WIXELA INHUB) 250-50 MCG/ACT AEROSOL POWDER, BREATH ACTIVATED Inhale 1 Puff every 12 hours. 60 Each 1     metoprolol tartrate (LOPRESSOR) 25 MG Tab Take 1 Tablet by mouth 2 times a day. 180 Tablet 3    lisinopril (PRINIVIL) 40 MG tablet Take 1 Tablet by mouth every day. 90 Tablet 3    atorvastatin (LIPITOR) 80 MG tablet Take 1 Tablet by mouth every evening. 90 Tablet 3    furosemide (LASIX) 20 MG Tab Take 1 Tablet by mouth every day. 90 Tablet 2    omeprazole (PRILOSEC) 20 MG delayed-release capsule TAKE 1 CAPSULE BY MOUTH EVERY DAY 90 Capsule 1    albuterol 108 (90 Base) MCG/ACT Aero Soln inhalation aerosol Inhale 2 Puffs every four hours as needed (Emergency Rescue use for - Shortness of Breath, or Wheezing.). 1 Each 3    Probiotic Product (PROBIOTIC DAILY PO) Take  by mouth.      aspirin EC (ECOTRIN) 81 MG TBEC Take 1 Tab by mouth every day. 90 Tab 3    [DISCONTINUED] vitamin D2, Ergocalciferol, (DRISDOL) 1.25 MG (45496 UT) Cap capsule Take 1 Capsule by mouth every 7 days. 12 Capsule 0     No facility-administered encounter medications on file as of 10/11/2022.        Allergies   Allergen Reactions    Iodine Itching    Oxycodone-Acetaminophen Hives    Sulfa Drugs Vomiting    Food      avocado    Nsaids      Pt states she was told to never take these because of her kidneys.             Review of Systems   Constitutional:  Negative for fever.   Respiratory:  Negative for shortness of breath.    Cardiovascular:  Negative for chest pain.   Gastrointestinal:  Positive for nausea (once in a while). Negative for abdominal pain.   Genitourinary:  Negative for dysuria.   All other systems reviewed and are negative.    /68 (BP Location: Right arm, Patient Position: Sitting, BP Cuff Size: Adult)   Pulse 63   Temp 35.9 °C (96.7 °F) (Temporal)   Ht 1.524 m (5')   Wt 73.7 kg (162 lb 8 oz)   SpO2 97%   BMI 31.74 kg/m²     Physical Exam  Constitutional:       General: She is not in acute distress.     Appearance: She is obese.   HENT:      Mouth/Throat:      Pharynx: No oropharyngeal exudate.   Eyes:      General: No  scleral icterus.  Neck:      Trachea: No tracheal deviation.   Cardiovascular:      Rate and Rhythm: Normal rate and regular rhythm.      Heart sounds: Normal heart sounds. No murmur heard.  Pulmonary:      Effort: Pulmonary effort is normal.      Breath sounds: Normal breath sounds. No stridor. No rales.   Abdominal:      General: Bowel sounds are normal.      Palpations: Abdomen is soft.      Tenderness: There is no abdominal tenderness.   Musculoskeletal:         General: Normal range of motion.      Cervical back: Neck supple.      Right lower leg: Edema (2+) present.      Left lower leg: Edema (2+) present.   Skin:     General: Skin is warm and dry.      Findings: No rash.   Neurological:      General: No focal deficit present.      Mental Status: She is alert and oriented to person, place, and time.   Psychiatric:         Mood and Affect: Mood and affect normal.         Behavior: Behavior normal.         Labs reviewed.    Recent Labs     02/15/22  1130 05/05/22  0930 05/26/22  1222 09/30/22  1138   ALBUMIN 4.4  --  4.3 4.1   SODIUM 138 138 141 143   POTASSIUM 4.7 4.7 4.9 5.6*   CHLORIDE 100 100 104 109   CO2 26 27 23 25   BUN 28* 32* 34* 34*   CREATININE 1.16 1.24 1.45* 1.33   PHOSPHORUS  --   --  3.9 3.9       Lab Results   Component Value Date/Time    WBC 8.0 09/30/2022 11:38 AM    RBC 3.92 (L) 09/30/2022 11:38 AM    HEMOGLOBIN 12.2 09/30/2022 11:38 AM    HEMATOCRIT 37.8 09/30/2022 11:38 AM    MCV 96.4 09/30/2022 11:38 AM    MCH 31.1 09/30/2022 11:38 AM    MCHC 32.3 (L) 09/30/2022 11:38 AM    MPV 10.7 09/30/2022 11:38 AM             URINALYSIS:  Lab Results   Component Value Date/Time    COLORURINE Yellow 09/30/2022 1139    CLARITY Clear 09/30/2022 1139    SPECGRAVITY 1.011 09/30/2022 1139    PHURINE 6.0 09/30/2022 1139    KETONES Negative 09/30/2022 1139    PROTEINURIN Negative 09/30/2022 1139    BILIRUBINUR Negative 09/30/2022 1139    UROBILU 0.2 09/30/2022 1139    NITRITE Negative 09/30/2022 1139     LEUKESTERAS Trace (A) 09/30/2022 1139    OCCULTBLOOD Negative 09/30/2022 1139       [facility]  Lab Results   Component Value Date/Time    TOTPROTUR 4.0 09/30/2022 1139      Lab Results   Component Value Date/Time    CREATININEU 48.88 09/30/2022 1139           Imaging reviewed  No orders to display         Assessment:  Valorie Sierra is a 82 y.o. female with HTN, pre-diabetes, CKD 3B, chronic diastolic heart failure who presents today for follow-up.    Plan:  1.  CKD stage IIIb  -CKD labs stabilized.  The patient's underlying CKD likely from hypertension and age-related kidney decline.  I recommend avoid NSAIDs and other nephrotoxins.  I recommend low-salt diet.  I explained to the importance of blood pressure control to help slow CKD progression.  Maintain lisinopril for long-term kidney protection.    2. Prediabetes  -I explained the importance of glycemic control to help slow CKD progression.  Her prediabetes is currently diet controlled.    3. Essential hypertension  -Controlled.  Continue lisinopril at maximally tolerated dose.    4. Chronic diastolic congestive heart failure (HCC)  -Patient appears mildly volume overloaded today.  I recommend Lasix 40 mg p.o. daily for 2 days, then return to 20 mg p.o. daily.  If her volume status continues to be high, patient's Lasix should be increased to 40 mg p.o. daily permanently.  Defer further management to primary care and/or cardiology.    5.  Vitamin D deficiency  - Improved with prescription ergocalciferol, now completed.  For maintenance, recommend over-the-counter vitamin D 2000 units daily.    Return to clinic 8 months with preclinic labs    Vitaliy Simon MD  Nephrology

## 2022-10-11 NOTE — PATIENT INSTRUCTIONS
"If needed, \"double for two.\" If your breathing is worse when lying flat, or if the leg swelling gets very bad, increase lasix to 40mg a day for two days.      I recommend taking over the counter vitamin D (Cholecalciferol, Vitamin D2 or D3, doesn't matter which) 125 mcg (which is the same as 5000 international units / IU) three times a week, such as on Monday Wednesday Friday. Alternatively, take cholecalciferol 2000 units daily if daily regimen is preferred.   "

## 2022-11-10 ENCOUNTER — OFFICE VISIT (OUTPATIENT)
Dept: CARDIOLOGY | Facility: MEDICAL CENTER | Age: 82
End: 2022-11-10
Payer: MEDICARE

## 2022-11-10 VITALS
HEIGHT: 60 IN | DIASTOLIC BLOOD PRESSURE: 70 MMHG | WEIGHT: 159 LBS | SYSTOLIC BLOOD PRESSURE: 130 MMHG | BODY MASS INDEX: 31.22 KG/M2 | HEART RATE: 65 BPM | OXYGEN SATURATION: 95 %

## 2022-11-10 DIAGNOSIS — N18.32 STAGE 3B CHRONIC KIDNEY DISEASE: ICD-10-CM

## 2022-11-10 DIAGNOSIS — I25.10 CORONARY ARTERY DISEASE INVOLVING NATIVE CORONARY ARTERY OF NATIVE HEART WITHOUT ANGINA PECTORIS: ICD-10-CM

## 2022-11-10 DIAGNOSIS — I27.20 PULMONARY HYPERTENSION (HCC): ICD-10-CM

## 2022-11-10 DIAGNOSIS — I50.32 CHRONIC DIASTOLIC CONGESTIVE HEART FAILURE (HCC): ICD-10-CM

## 2022-11-10 DIAGNOSIS — Z95.1 HX OF CABG: ICD-10-CM

## 2022-11-10 DIAGNOSIS — J43.1 PANLOBULAR EMPHYSEMA (HCC): ICD-10-CM

## 2022-11-10 DIAGNOSIS — I10 ESSENTIAL HYPERTENSION: Chronic | ICD-10-CM

## 2022-11-10 DIAGNOSIS — R73.03 PREDIABETES: ICD-10-CM

## 2022-11-10 DIAGNOSIS — E78.5 DYSLIPIDEMIA: ICD-10-CM

## 2022-11-10 PROCEDURE — 99214 OFFICE O/P EST MOD 30 MIN: CPT | Performed by: NURSE PRACTITIONER

## 2022-11-10 RX ORDER — FLUTICASONE PROPIONATE AND SALMETEROL 250; 50 UG/1; UG/1
1 POWDER RESPIRATORY (INHALATION) EVERY 12 HOURS
Qty: 180 EACH | Refills: 3 | Status: SHIPPED | OUTPATIENT
Start: 2022-11-10 | End: 2023-12-01

## 2022-11-10 ASSESSMENT — ENCOUNTER SYMPTOMS
MYALGIAS: 0
NERVOUS/ANXIOUS: 1
DIZZINESS: 0
ORTHOPNEA: 0
CLAUDICATION: 0
COUGH: 0
SHORTNESS OF BREATH: 0
FEVER: 0
ABDOMINAL PAIN: 0
BRUISES/BLEEDS EASILY: 1
PALPITATIONS: 0
HEADACHES: 0
PND: 0

## 2022-11-10 ASSESSMENT — FIBROSIS 4 INDEX: FIB4 SCORE: 1.49

## 2022-11-10 NOTE — PROGRESS NOTES
Chief Complaint   Patient presents with    Dyslipidemia    Hypertension    Congestive Heart Failure     F/V Dx; Chronic diastolic congestive heart failure (HCC)     Subjective     Valorie Sierra is a 82 y.o. female who presents today for 6 month follow up.    Hx of HTN, HLD, CAD with prior CABG in '14 by She, pulmonary HTN with obesity, COPD with former smoking hx and pulmonary HTN, DM type II, CKD stage 3a, and anemia.    She at in [facility].    She has mild fatigue and exertional shortness of breath. She doesn't exercise, but does go on short walks.    She has some leg swelling which is chronic since her CABG.    She has been seeing nephrology Dr. Simon.    She has a non-productive cough for the past year. She has no chest pain, dizziness/lightheadedness, or palpitations.    Past Medical History:   Diagnosis Date    Anemia     Anxiety     Arrhythmia     Arthritis     Blood transfusion, without reported diagnosis     CHF (congestive heart failure) (HCC)     Chronic airway obstruction, not elsewhere classified     Depression     Emphysema     GERD (gastroesophageal reflux disease)     Headache, classical migraine     Heart attack (HCC)     Heart murmur     Hyperlipidemia     Hypertension     IBD (inflammatory bowel disease)     Kidney disease     Ulcer     Unspecified cataract      Past Surgical History:   Procedure Laterality Date    MULTIPLE CORONARY ARTERY BYPASS ENDO VEIN HARVEST  9/1/2014    Performed by Stephen Nowak M.D. at [facility]    BLADDER NECK CONTRACTURE EXICISION  1970    GANGLION EXCISION  1969    left wrist    ABDOMINAL EXPLORATION      ABDOMINAL HYSTERECTOMY TOTAL      fibroids    APPENDECTOMY      DENTAL EXTRACTION(S)       Family History   Problem Relation Age of Onset    Stroke Mother     Hyperlipidemia Mother     Hypertension Mother     Lung Disease Mother         smoker    Heart Disease Father     Hypertension Father     Hyperlipidemia Father     Lung Disease Father          smoker    Alcohol/Drug Father         etoh    Other Father         aneurysm    Kidney Disease Neg Hx      Social History     Socioeconomic History    Marital status:      Spouse name: Not on file    Number of children: Not on file    Years of education: Not on file    Highest education level: Not on file   Occupational History    Not on file   Tobacco Use    Smoking status: Former     Packs/day: 3.00     Years: 55.00     Pack years: 165.00     Types: Cigarettes     Quit date: 2007     Years since quitting: 15.2    Smokeless tobacco: Never    Tobacco comments:     Quit   (Hx 3ppd x 55yr)...    Vaping Use    Vaping Use: Never used   Substance and Sexual Activity    Alcohol use: No     Alcohol/week: 0.0 oz    Drug use: No    Sexual activity: Never     Comment: lives in a motel (prev.w.son-who  ), dtr  in , ,    Other Topics Concern    Not on file   Social History Narrative    Not on file     Social Determinants of Health     Financial Resource Strain: Not on file   Food Insecurity: Not on file   Transportation Needs: Not on file   Physical Activity: Not on file   Stress: Not on file   Social Connections: Not on file   Intimate Partner Violence: Not on file   Housing Stability: Not on file     Allergies   Allergen Reactions    Iodine Itching    Oxycodone-Acetaminophen Hives    Sulfa Drugs Vomiting    Food      avocado    Nsaids      Pt states she was told to never take these because of her kidneys.     Outpatient Encounter Medications as of 11/10/2022   Medication Sig Dispense Refill    Cholecalciferol 2000 UNIT Cap Take 5,000 Units by mouth. 3 times a week (M,W,F)      fluticasone-salmeterol (WIXELA INHUB) 250-50 MCG/ACT AEROSOL POWDER, BREATH ACTIVATED Inhale 1 Puff every 12 hours. 60 Each 0    metoprolol tartrate (LOPRESSOR) 25 MG Tab Take 1 Tablet by mouth 2 times a day. 180 Tablet 3    lisinopril (PRINIVIL) 40 MG tablet Take 1 Tablet by mouth every day. 90 Tablet 3     atorvastatin (LIPITOR) 80 MG tablet Take 1 Tablet by mouth every evening. 90 Tablet 3    furosemide (LASIX) 20 MG Tab Take 1 Tablet by mouth every day. 90 Tablet 2    omeprazole (PRILOSEC) 20 MG delayed-release capsule TAKE 1 CAPSULE BY MOUTH EVERY DAY (Patient taking differently: Take 1 Capsule by mouth. 3 times a week (M,W,F)) 90 Capsule 1    albuterol 108 (90 Base) MCG/ACT Aero Soln inhalation aerosol Inhale 2 Puffs every four hours as needed (Emergency Rescue use for - Shortness of Breath, or Wheezing.). 1 Each 3    Probiotic Product (PROBIOTIC DAILY PO) Take  by mouth.      aspirin EC (ECOTRIN) 81 MG TBEC Take 1 Tab by mouth every day. 90 Tab 3     No facility-administered encounter medications on file as of 11/10/2022.     Review of Systems   Constitutional:  Negative for fever and malaise/fatigue.   Respiratory:  Negative for cough and shortness of breath.    Cardiovascular:  Positive for leg swelling. Negative for chest pain, palpitations, orthopnea, claudication and PND.        Leg swelling since CABG surgery, mild   Gastrointestinal:  Negative for abdominal pain.   Musculoskeletal:  Negative for myalgias.        Leg cramps at night   Neurological:  Negative for dizziness and headaches.        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   Psychiatric/Behavioral:  The patient is nervous/anxious.             Objective     /70 (BP Location: Left arm, Patient Position: Sitting, BP Cuff Size: Adult)   Pulse 65   Resp (P) 14   Ht 1.524 m (5')   Wt 72.1 kg (159 lb)   SpO2 95%   BMI 31.05 kg/m²     Physical Exam  Vitals and nursing note reviewed.   Constitutional:       Appearance: Normal appearance. She is well-developed. She is obese.   HENT:      Head: Normocephalic and atraumatic.   Neck:      Vascular: No JVD.   Cardiovascular:      Rate and Rhythm: Normal rate and regular rhythm.      Pulses: Normal pulses.      Heart sounds: Normal heart sounds.   Pulmonary:       Effort: Pulmonary effort is normal.      Breath sounds: Normal breath sounds.   Musculoskeletal:         General: Normal range of motion.      Right lower leg: Edema present.      Left lower leg: Edema present.      Comments: Mild LE edema. Uses a cane.   Skin:     General: Skin is warm and dry.      Capillary Refill: Capillary refill takes less than 2 seconds.   Neurological:      General: No focal deficit present.      Mental Status: She is alert and oriented to person, place, and time. Mental status is at baseline.   Psychiatric:         Mood and Affect: Mood normal.         Behavior: Behavior normal.         Thought Content: Thought content normal.         Judgment: Judgment normal.              Assessment & Plan     1. Prediabetes        2. Chronic diastolic congestive heart failure (HCC)        3. Dyslipidemia        4. Essential hypertension        5. Hx of CABG        6. CAD - Coronary artery disease involving native coronary artery of native heart without angina pectoris        7. Pulmonary hypertension (HCC)        8. Stage 3b chronic kidney disease (HCC)            Medical Decision Making: Today's Assessment/Status/Plan:        1. CAD with prior CABG in '14  -no angina, mild THOMAS with sedentary lifestyle and COPD  -follow symptoms  -cont asa, statin  -no recent stress imaging, last echo was in '18    2. HTN  -At goal BP  -cont metoprolol 25 mg BID, lisinopril 40 mg QD, and lasix 20 mg QD  -follow with PCP, will be happy to help adjust medications if PCP wants our advice    3. DM type II with CKD  -follow labs with PCP  -A1C 5.9 on 06/14/2022    4. LE edema  - multifactorial with CKD and prior CABG  -good pulses bilaterally  -follow symptoms     5. HLD  -statin  -LDL goal <70 with CAD  -labs at goal, check annually    6. Hyperkalemia  -5.6 on 09/30/2022  -followed by Dr. Simon  -on lasix w/o potassium supplementation     Patient is to follow up with Miranda JASON in 1 year annual labs through PCP.

## 2022-12-14 ENCOUNTER — OFFICE VISIT (OUTPATIENT)
Dept: MEDICAL GROUP | Facility: PHYSICIAN GROUP | Age: 82
End: 2022-12-14
Payer: MEDICARE

## 2022-12-14 VITALS
HEIGHT: 60 IN | OXYGEN SATURATION: 95 % | TEMPERATURE: 96.8 F | RESPIRATION RATE: 16 BRPM | HEART RATE: 61 BPM | BODY MASS INDEX: 31.22 KG/M2 | SYSTOLIC BLOOD PRESSURE: 138 MMHG | DIASTOLIC BLOOD PRESSURE: 88 MMHG | WEIGHT: 159 LBS

## 2022-12-14 DIAGNOSIS — I50.32 CHRONIC DIASTOLIC CONGESTIVE HEART FAILURE (HCC): ICD-10-CM

## 2022-12-14 DIAGNOSIS — R73.03 PREDIABETES: ICD-10-CM

## 2022-12-14 PROCEDURE — 99214 OFFICE O/P EST MOD 30 MIN: CPT | Performed by: NURSE PRACTITIONER

## 2022-12-14 ASSESSMENT — FIBROSIS 4 INDEX: FIB4 SCORE: 1.49

## 2022-12-14 NOTE — PROGRESS NOTES
Subjective:     CC:   Chief Complaint   Patient presents with    Follow-Up     A1c 3 monthj check up .        HPI:   Patient is a 82 y.o. established female patient with medical history listed below here today for evaluation and management of prediabetes.   Additional concern today is increased swelling in legs the past week. Hx of CHF, CKD, CABG; she increased Lasix over the past weekend to 40mg QD (from 20mg) for 2 days. Had some improvement with edema but states not completely resolved.     Problem   Prediabetes     Latest Reference Range & Units 05/28/21 08:10 12/01/21 08:21 06/14/22 12:31   Glycohemoglobin 0.0 - 5.6 % 6.5 (H) 6.1 (H) 5.9 !     - diet controlled     Chronic Diastolic Congestive Heart Failure (Hcc)    Followed by Cardiology; Also with Hx CABG 2014, HTN  Last echo done in 2018- EF 60%.  Taking Metoprolol 25mg QD, Lasix 20mg QD         Patient Active Problem List   Diagnosis    Chronic diastolic congestive heart failure (HCC)    Dyslipidemia    COPD (chronic obstructive pulmonary disease) (HCC)    Essential hypertension    Hx of CABG    CAD - Coronary artery disease involving native coronary artery of native heart without angina pectoris    Osteopenia    Risk for falls    Pulmonary hypertension (HCC)    Stage 3b chronic kidney disease (HCC)    Tonsil pain    Healthcare maintenance    Prediabetes    Muscle cramping    Acute cystitis without hematuria       Past Medical History:   Diagnosis Date    Anemia     Anxiety     Arrhythmia     Arthritis     Blood transfusion, without reported diagnosis     CHF (congestive heart failure) (HCC)     Chronic airway obstruction, not elsewhere classified     Depression     Emphysema     GERD (gastroesophageal reflux disease)     Headache, classical migraine     Heart attack (HCC)     Heart murmur     Hyperlipidemia     Hypertension     IBD (inflammatory bowel disease)     Kidney disease     Ulcer     Unspecified cataract         Past Surgical History:   Procedure  Laterality Date    MULTIPLE CORONARY ARTERY BYPASS ENDO VEIN HARVEST  9/1/2014    Performed by Stephen Nowak M.D. at SURGERY [facility]    BLADDER NECK CONTRACTURE EXICISION  1970    GANGLION EXCISION  1969    left wrist    ABDOMINAL EXPLORATION      ABDOMINAL HYSTERECTOMY TOTAL      fibroids    APPENDECTOMY      DENTAL EXTRACTION(S)          Current Outpatient Medications on File Prior to Visit   Medication Sig Dispense Refill    fluticasone-salmeterol (WIXELA INHUB) 250-50 MCG/ACT AEROSOL POWDER, BREATH ACTIVATED Inhale 1 Puff every 12 hours. 180 Each 3    Cholecalciferol 2000 UNIT Cap Take 5,000 Units by mouth. 3 times a week (M,W,F)      metoprolol tartrate (LOPRESSOR) 25 MG Tab Take 1 Tablet by mouth 2 times a day. 180 Tablet 3    lisinopril (PRINIVIL) 40 MG tablet Take 1 Tablet by mouth every day. 90 Tablet 3    atorvastatin (LIPITOR) 80 MG tablet Take 1 Tablet by mouth every evening. 90 Tablet 3    furosemide (LASIX) 20 MG Tab Take 1 Tablet by mouth every day. 90 Tablet 2    omeprazole (PRILOSEC) 20 MG delayed-release capsule TAKE 1 CAPSULE BY MOUTH EVERY DAY (Patient taking differently: Take 20 mg by mouth. 3 times a week (M,W,F)) 90 Capsule 1    albuterol 108 (90 Base) MCG/ACT Aero Soln inhalation aerosol Inhale 2 Puffs every four hours as needed (Emergency Rescue use for - Shortness of Breath, or Wheezing.). 1 Each 3    Probiotic Product (PROBIOTIC DAILY PO) Take  by mouth.      aspirin EC (ECOTRIN) 81 MG TBEC Take 1 Tab by mouth every day. 90 Tab 3     No current facility-administered medications on file prior to visit.        Objective:     Exam:  /88   Pulse 61   Temp 36 °C (96.8 °F)   Resp 16   Ht 1.524 m (5')   Wt 72.1 kg (159 lb)   SpO2 95%   BMI 31.05 kg/m²  Body mass index is 31.05 kg/m².    Physical Exam  Constitutional:       Appearance: Normal appearance.   Cardiovascular:      Rate and Rhythm: Normal rate and regular rhythm.      Pulses: Normal pulses.      Heart sounds:  Normal heart sounds.   Pulmonary:      Effort: Pulmonary effort is normal.      Breath sounds: Normal breath sounds.   Musculoskeletal:      Right lower leg: Edema present.      Left lower leg: Edema present.      Comments: Ambulates with cane    Skin:     General: Skin is warm and dry.   Neurological:      General: No focal deficit present.      Mental Status: She is alert and oriented to person, place, and time.     Monofilament testing with a 10 gram force: sensation intact: intact bilaterally  Visual Inspection: Feet without maceration, ulcers, fissures.  Pedal pulses: intact bilaterally     Assessment & Plan:     82 y.o. female with the following -     Problem List Items Addressed This Visit       Chronic diastolic congestive heart failure (HCC)     Chronic condition; increased LE edema the past week; no changes in activity level, no additional dyspnea. Denies CP, palpitations, dizziness.  - increase Lasix to 40mg QD x 2days and then go back to 20mg QD. Can repeat this again in a week if still with increased edema.   - continue Metoprolol 25mg QD  - monitor annual CMP         Prediabetes     A1C today in clinic 6.0 (up slightly from 5.9)  - continue with low sugar, reduced carb intake  - monitor A1C Q 6 months         Relevant Orders    POCT  A1C     Educated in proper administration of medication(s) ordered today including safety, possible SE, risks, benefits, rationale and alternatives to therapy.     Return in about 6 months (around 6/14/2023) for prediabetes; .    Please note that this dictation was created using voice recognition software. I have made every reasonable attempt to correct obvious errors, but I expect that there are errors of grammar and possibly content that I did not discover before finalizing the note.

## 2022-12-16 NOTE — ASSESSMENT & PLAN NOTE
Chronic condition; increased LE edema the past week; no changes in activity level, no additional dyspnea. Denies CP, palpitations, dizziness.  - increase Lasix to 40mg QD x 2days and then go back to 20mg QD. Can repeat this again in a week if still with increased edema.   - continue Metoprolol 25mg QD  - counseled on low salt diet, especially around the holidays  - monitor CMP Q 6 months

## 2022-12-16 NOTE — ASSESSMENT & PLAN NOTE
A1C today in clinic 6.0 (up slightly from 5.9)  - continue with low sugar, reduced carb intake  - monitor A1C Q 6 months

## 2022-12-24 ENCOUNTER — APPOINTMENT (OUTPATIENT)
Dept: RADIOLOGY | Facility: MEDICAL CENTER | Age: 82
End: 2022-12-24
Attending: EMERGENCY MEDICINE
Payer: MEDICARE

## 2022-12-24 ENCOUNTER — HOSPITAL ENCOUNTER (EMERGENCY)
Facility: MEDICAL CENTER | Age: 82
End: 2022-12-24
Attending: EMERGENCY MEDICINE
Payer: MEDICARE

## 2022-12-24 VITALS
HEIGHT: 60 IN | DIASTOLIC BLOOD PRESSURE: 84 MMHG | TEMPERATURE: 97 F | SYSTOLIC BLOOD PRESSURE: 206 MMHG | HEART RATE: 99 BPM | RESPIRATION RATE: 16 BRPM | WEIGHT: 162.7 LBS | BODY MASS INDEX: 31.94 KG/M2 | OXYGEN SATURATION: 93 %

## 2022-12-24 DIAGNOSIS — B30.9 ACUTE VIRAL CONJUNCTIVITIS OF BOTH EYES: ICD-10-CM

## 2022-12-24 DIAGNOSIS — J06.9 VIRAL URI: ICD-10-CM

## 2022-12-24 LAB
ALBUMIN SERPL BCP-MCNC: 3.5 G/DL (ref 3.2–4.9)
ALBUMIN/GLOB SERPL: 1 G/DL
ALP SERPL-CCNC: 124 U/L (ref 30–99)
ALT SERPL-CCNC: 28 U/L (ref 2–50)
ANION GAP SERPL CALC-SCNC: 13 MMOL/L (ref 7–16)
AST SERPL-CCNC: 28 U/L (ref 12–45)
BASOPHILS # BLD AUTO: 0 % (ref 0–1.8)
BASOPHILS # BLD: 0 K/UL (ref 0–0.12)
BILIRUB SERPL-MCNC: 0.5 MG/DL (ref 0.1–1.5)
BUN SERPL-MCNC: 22 MG/DL (ref 8–22)
BURR CELLS BLD QL SMEAR: NORMAL
CALCIUM ALBUM COR SERPL-MCNC: 9.6 MG/DL (ref 8.5–10.5)
CALCIUM SERPL-MCNC: 9.2 MG/DL (ref 8.5–10.5)
CHLORIDE SERPL-SCNC: 99 MMOL/L (ref 96–112)
CO2 SERPL-SCNC: 25 MMOL/L (ref 20–33)
CREAT SERPL-MCNC: 1.22 MG/DL (ref 0.5–1.4)
EOSINOPHIL # BLD AUTO: 0.47 K/UL (ref 0–0.51)
EOSINOPHIL NFR BLD: 3.4 % (ref 0–6.9)
ERYTHROCYTE [DISTWIDTH] IN BLOOD BY AUTOMATED COUNT: 49.8 FL (ref 35.9–50)
FLUAV RNA SPEC QL NAA+PROBE: NEGATIVE
FLUBV RNA SPEC QL NAA+PROBE: NEGATIVE
GFR SERPLBLD CREATININE-BSD FMLA CKD-EPI: 44 ML/MIN/1.73 M 2
GLOBULIN SER CALC-MCNC: 3.5 G/DL (ref 1.9–3.5)
GLUCOSE SERPL-MCNC: 128 MG/DL (ref 65–99)
HCT VFR BLD AUTO: 32.1 % (ref 37–47)
HGB BLD-MCNC: 10.7 G/DL (ref 12–16)
LYMPHOCYTES # BLD AUTO: 2.02 K/UL (ref 1–4.8)
LYMPHOCYTES NFR BLD: 14.5 % (ref 22–41)
MANUAL DIFF BLD: NORMAL
MCH RBC QN AUTO: 30.8 PG (ref 27–33)
MCHC RBC AUTO-ENTMCNC: 33.3 G/DL (ref 33.6–35)
MCV RBC AUTO: 92.5 FL (ref 81.4–97.8)
MONOCYTES # BLD AUTO: 1.67 K/UL (ref 0–0.85)
MONOCYTES NFR BLD AUTO: 12 % (ref 0–13.4)
MORPHOLOGY BLD-IMP: NORMAL
NEUTROPHILS # BLD AUTO: 9.74 K/UL (ref 2–7.15)
NEUTROPHILS NFR BLD: 70.1 % (ref 44–72)
NRBC # BLD AUTO: 0 K/UL
NRBC BLD-RTO: 0 /100 WBC
NT-PROBNP SERPL IA-MCNC: 3699 PG/ML (ref 0–125)
PLATELET # BLD AUTO: 367 K/UL (ref 164–446)
PLATELET BLD QL SMEAR: NORMAL
PMV BLD AUTO: 9.5 FL (ref 9–12.9)
POIKILOCYTOSIS BLD QL SMEAR: NORMAL
POTASSIUM SERPL-SCNC: 3.8 MMOL/L (ref 3.6–5.5)
PROCALCITONIN SERPL-MCNC: 0.12 NG/ML
PROT SERPL-MCNC: 7 G/DL (ref 6–8.2)
RBC # BLD AUTO: 3.47 M/UL (ref 4.2–5.4)
RBC BLD AUTO: PRESENT
RSV RNA SPEC QL NAA+PROBE: NEGATIVE
SARS-COV-2 RNA RESP QL NAA+PROBE: NOTDETECTED
SODIUM SERPL-SCNC: 137 MMOL/L (ref 135–145)
SPECIMEN SOURCE: NORMAL
TOXIC GRANULES BLD QL SMEAR: SLIGHT
WBC # BLD AUTO: 13.9 K/UL (ref 4.8–10.8)

## 2022-12-24 PROCEDURE — 85007 BL SMEAR W/DIFF WBC COUNT: CPT

## 2022-12-24 PROCEDURE — C9803 HOPD COVID-19 SPEC COLLECT: HCPCS | Performed by: EMERGENCY MEDICINE

## 2022-12-24 PROCEDURE — 85025 COMPLETE CBC W/AUTO DIFF WBC: CPT

## 2022-12-24 PROCEDURE — 83880 ASSAY OF NATRIURETIC PEPTIDE: CPT

## 2022-12-24 PROCEDURE — 0241U HCHG SARS-COV-2 COVID-19 NFCT DS RESP RNA 4 TRGT MIC: CPT

## 2022-12-24 PROCEDURE — 99284 EMERGENCY DEPT VISIT MOD MDM: CPT | Mod: 25

## 2022-12-24 PROCEDURE — 36415 COLL VENOUS BLD VENIPUNCTURE: CPT

## 2022-12-24 PROCEDURE — 80053 COMPREHEN METABOLIC PANEL: CPT

## 2022-12-24 PROCEDURE — 71045 X-RAY EXAM CHEST 1 VIEW: CPT

## 2022-12-24 PROCEDURE — 87040 BLOOD CULTURE FOR BACTERIA: CPT | Mod: 91

## 2022-12-24 PROCEDURE — 84145 PROCALCITONIN (PCT): CPT

## 2022-12-24 RX ORDER — IPRATROPIUM BROMIDE AND ALBUTEROL SULFATE 2.5; .5 MG/3ML; MG/3ML
3 SOLUTION RESPIRATORY (INHALATION)
Status: DISCONTINUED | OUTPATIENT
Start: 2022-12-24 | End: 2022-12-24 | Stop reason: HOSPADM

## 2022-12-24 RX ORDER — GENTAMICIN SULFATE 3 MG/ML
1 SOLUTION/ DROPS OPHTHALMIC EVERY 4 HOURS
Qty: 5 ML | Refills: 0 | Status: SHIPPED | OUTPATIENT
Start: 2022-12-24 | End: 2022-12-31

## 2022-12-24 ASSESSMENT — FIBROSIS 4 INDEX: FIB4 SCORE: 1.49

## 2022-12-24 NOTE — ED TRIAGE NOTES
Chief Complaint   Patient presents with    Flu Like Symptoms     Patient reports sick for last 7 days with fever, cough, sore throat, nasal congestion.     Red Eye     Patient reports yesterday eyes red and had crust over eyes but denies any itching.

## 2022-12-25 NOTE — ED NOTES
Patient discharged per order. Oral and written discharge instructions reviewed. IV removed. Medications sent to home pharmacy. New medications reviewed. All belongings accounted for and taken with patient. Questions answered, and patient agrees with discharge plan. Patient escorted to lobby with friend. Encouraged to follow up with PCP.

## 2022-12-25 NOTE — ED PROVIDER NOTES
ED Provider Note    CHIEF COMPLAINT  Chief Complaint   Patient presents with    Flu Like Symptoms     Patient reports sick for last 7 days with fever, cough, sore throat, nasal congestion.     Red Eye     Patient reports yesterday eyes red and had crust over eyes but denies any itching.      HPI  Valorie Sierra is a 82 y.o. female who presents to the emergency department brought in by a friend complaining that she has been ill for 7 days.  The patient has had a cough is now productive with yellow phlegm she has had a bit of a sore throat she does not know if she has had a fever but she says she has felt hot from time to time.  She has less of an appetite than usual.  Thighs have become red and injected with increased tearing.  The patient states that she has had her flu vaccine and all of her COVID vaccinations.      REVIEW OF SYSTEMS  See HPI for further details. All other systems are negative.     PAST MEDICAL HISTORY   has a past medical history of Anemia, Anxiety, Arrhythmia, Arthritis, Blood transfusion, without reported diagnosis, CHF (congestive heart failure) (HCC), Chronic airway obstruction, not elsewhere classified, Depression, Emphysema, GERD (gastroesophageal reflux disease), Headache, classical migraine, Heart attack (HCC), Heart murmur, Hyperlipidemia, Hypertension, IBD (inflammatory bowel disease), Kidney disease, Ulcer, and Unspecified cataract.    SURGICAL HISTORY   has a past surgical history that includes multiple coronary artery bypass endo vein harvest (9/1/2014); abdominal hysterectomy total; bladder neck contracture exicision (1970); ganglion excision (1969); abdominal exploration; appendectomy; and dental extraction(s).    FAMILY HISTORY  Family History   Problem Relation Age of Onset    Stroke Mother     Hyperlipidemia Mother     Hypertension Mother     Lung Disease Mother         smoker    Heart Disease Father     Hypertension Father     Hyperlipidemia Father     Lung Disease Father          smoker    Alcohol/Drug Father         etoh    Other Father         aneurysm    Kidney Disease Neg Hx        SOCIAL HISTORY  Social History     Tobacco Use    Smoking status: Former     Packs/day: 3.00     Years: 55.00     Pack years: 165.00     Types: Cigarettes     Quit date: 2007     Years since quitting: 15.3    Smokeless tobacco: Never    Tobacco comments:     Quit   (Hx 3ppd x 55yr)...    Vaping Use    Vaping Use: Never used   Substance and Sexual Activity    Alcohol use: No     Alcohol/week: 0.0 oz    Drug use: No    Sexual activity: Never     Comment: lives in a motel (prev.w.son-who  ), dtr  in , ,        CURRENT MEDICATIONS  Home Medications       Reviewed by Minerva Sprague R.N. (Registered Nurse) on 22 at 1431  Med List Status: Partial     Medication Last Dose Status   albuterol 108 (90 Base) MCG/ACT Aero Soln inhalation aerosol  Active   aspirin EC (ECOTRIN) 81 MG TBEC  Active   atorvastatin (LIPITOR) 80 MG tablet  Active   Cholecalciferol 2000 UNIT Cap  Active   fluticasone-salmeterol (WIXELA INHUB) 250-50 MCG/ACT AEROSOL POWDER, BREATH ACTIVATED  Active   furosemide (LASIX) 20 MG Tab  Active   lisinopril (PRINIVIL) 40 MG tablet  Active   metoprolol tartrate (LOPRESSOR) 25 MG Tab  Active   omeprazole (PRILOSEC) 20 MG delayed-release capsule  Active   Probiotic Product (PROBIOTIC DAILY PO)  Active                    ALLERGIES  Allergies   Allergen Reactions    Iodine Itching    Oxycodone-Acetaminophen Hives    Sulfa Drugs Vomiting    Food      avocado    Nsaids      Pt states she was told to never take these because of her kidneys.       PHYSICAL EXAM  VITAL SIGNS: BP (!) 195/79   Pulse 69   Temp 36 °C (96.8 °F) (Temporal)   Resp 16   Ht 1.524 m (5')   Wt 73.8 kg (162 lb 11.2 oz)   SpO2 93%   BMI 31.78 kg/m²    Constitutional: Awake lucid verbal she does not appear toxic or distressed  HENT: Mucous membranes are moist and throat clear  Eyes: Both eyes are red  and injected mildly and there is increased clear tearing but no purulent discharge  Neck: Supple  Cardiovascular: Regular rate and rhythm  Respiratory: Diminished breath sounds bilaterally and some slight expiratory wheezes bilaterally  Musculoskeletal: The patient does have 1+ pitting edema in both lower extremities she says this is chronic and feels that it is actually better than baseline.  Neurologic: Awake lucid verbal moving all extremities      DIAGNOSTIC STUDIES / PROCEDURES    LABS  Basic metabolic panel is fairly unremarkable with a slightly elevated blood glucose of 128 LFTs are unremarkable except for slightly elevated alk phos of 124 procalcitonin is within the normal range at 0.12 the BNP is elevated at 3699 testing for influenza COVID and RSV are negative.  CBC did show an elevated white blood cell count of 13.9.      RADIOLOGY  DX-CHEST-PORTABLE (1 VIEW)   Final Result         1.  Increase in basilar pulmonary opacifications could be due to developing pneumonia or pneumonitis. Aspiration is a possibility. Mild pulmonary edema may also be present.            COURSE & MEDICAL DECISION MAKING  Pertinent Labs & Imaging studies reviewed. (See chart for details)    Differential Diagnosis Considered  Differential diagnosis includes bacterial pneumonia which I think is much less likely given her normal procalcitonin and lack of focal findings on the chest x-ray.  Viral syndrome is within the differential diagnosis and I think this is the most likely diagnosis even with the flu COVID and RSV test being negative.  I think the patient's conjunctivitis is likely viral as well.  Finally the patient does have an elevated BNP but tells me that her leg edema is getting better after her doctor increased her dose of Lasix last week so I do not think that her primary diagnosis is congestive heart failure.    In the emergency department the patient is generally nontoxic-appearing however she has a number of comorbid  medical conditions therefore a complete evaluation was undertaken as described above.  At this point in time as noted above I think the most likely diagnosis is a viral condition I think the patient generally looks well and I think will be safe for her to go home.  Due to concern for possible bacterial superinfection of the eyes given that they are red and injected bilaterally I will place her on a short course of gentamicin ophthalmic eyedrops.  If the patient feels she is developing any new or worsening symptoms whatsoever she is to return immediately for recheck.  I have advised her to stay on her new Lasix dose as recently prescribed by her doctor and she is to call her doctor Monday morning and arrange office recheck as soon as possible during the week      FINAL IMPRESSION  1.  Viral upper respiratory tract illness  2.  Lateral conjunctivitis  3.  History of congestive heart failure       Electronically signed by: Dave Alonso M.D., 12/24/2022 4:08 PM

## 2022-12-25 NOTE — DISCHARGE INSTRUCTIONS
Continue on your new dose of Lasix as prescribed by your doctor.  Return here at once if you feel you are developing new or worsening symptoms.  Call your primary care doctor Monday morning and arrange office follow-up as soon as possible during the week

## 2022-12-28 ENCOUNTER — OFFICE VISIT (OUTPATIENT)
Dept: MEDICAL GROUP | Facility: PHYSICIAN GROUP | Age: 82
End: 2022-12-28
Payer: MEDICARE

## 2022-12-28 VITALS
WEIGHT: 162 LBS | HEART RATE: 93 BPM | DIASTOLIC BLOOD PRESSURE: 60 MMHG | RESPIRATION RATE: 16 BRPM | SYSTOLIC BLOOD PRESSURE: 144 MMHG | HEIGHT: 60 IN | BODY MASS INDEX: 31.8 KG/M2 | TEMPERATURE: 97.8 F | OXYGEN SATURATION: 95 %

## 2022-12-28 DIAGNOSIS — J06.9 URI, ACUTE: ICD-10-CM

## 2022-12-28 DIAGNOSIS — J43.1 PANLOBULAR EMPHYSEMA (HCC): ICD-10-CM

## 2022-12-28 PROCEDURE — 99214 OFFICE O/P EST MOD 30 MIN: CPT | Performed by: NURSE PRACTITIONER

## 2022-12-28 RX ORDER — BENZONATATE 100 MG/1
100 CAPSULE ORAL 3 TIMES DAILY PRN
Qty: 60 CAPSULE | Refills: 0 | Status: SHIPPED | OUTPATIENT
Start: 2022-12-28 | End: 2023-06-08

## 2022-12-28 RX ORDER — AZITHROMYCIN 250 MG/1
TABLET, FILM COATED ORAL
Qty: 6 TABLET | Refills: 0 | Status: SHIPPED | OUTPATIENT
Start: 2022-12-28 | End: 2023-01-02

## 2022-12-28 ASSESSMENT — FIBROSIS 4 INDEX: FIB4 SCORE: 1.2

## 2022-12-28 NOTE — ASSESSMENT & PLAN NOTE
New to examiner.  The patient was seen in the emergency room on 12/24/2022 for flulike symptoms present for 7 days including fever, cough, sore throat, nasal congestion as well as red eyes with crusty discharge.  Lab work was completed in the emergency room, procalcitonin in normal range, BNP elevated at 3699, influenza/COVID/RSV negative. CBC with elevated white blood cell count 13.9.  Chest x-ray showed increasing basilar pulmonary opacifications that could be due to developing pneumonia or pneumonitis, aspiration possibility, mild pulmonary edema may also be present.  She was discharged in stable condition with likely viral condition. She was treated with gentamicin ophthalmic eyedrops for conjunctivitis.  Recommended for her to continue new furosemide dose that was prescribed by Nephrology on Monday.  Reports that she continues to have watery drainage from her eyes, but they do feel and look better.  She continues to have a congested cough and bilateral leg swelling.  She follows with nephrology, Dr. Simon, who is managing furosemide 20 mg, reports that she takes 40 mg 2 days a week and 20 mg 5 days a week.  She is prescribed Wixela inhaler and albuterol inhaler for emphysema, states that she is using Wixela as prescribed but not using albuterol due to jitters and tachycardia.

## 2023-01-03 NOTE — PROGRESS NOTES
CC: Diagnoses of Panlobular emphysema (HCC) and URI, acute were pertinent to this visit.                                                                                                                                       HPI:   Valorie presents today with the following concerns:    COPD (chronic obstructive pulmonary disease) (HCC)  New to examiner.  The patient was seen in the emergency room on 12/24/2022 for flulike symptoms present for 7 days including fever, cough, sore throat, nasal congestion as well as red eyes with crusty discharge.  Lab work was completed in the emergency room, procalcitonin in normal range, BNP elevated at 3699, influenza/COVID/RSV negative. CBC with elevated white blood cell count 13.9.  Chest x-ray showed increasing basilar pulmonary opacifications that could be due to developing pneumonia or pneumonitis, aspiration possibility, mild pulmonary edema may also be present.  She was discharged in stable condition with likely viral condition. She was treated with gentamicin ophthalmic eyedrops for conjunctivitis.  Recommended for her to continue new furosemide dose that was prescribed by Nephrology on Monday.  Reports that she continues to have watery drainage from her eyes, but they do feel and look better.  She continues to have a congested cough and bilateral leg swelling.  She follows with nephrology, Dr. Simon, who is managing furosemide 20 mg, reports that she takes 40 mg 2 days a week and 20 mg 5 days a week.  She is prescribed Wixela inhaler and albuterol inhaler for emphysema, states that she is using Wixela as prescribed but not using albuterol due to jitters and tachycardia.    Patient Active Problem List    Diagnosis Date Noted    Acute cystitis without hematuria 06/14/2022    Muscle cramping 10/28/2021    Prediabetes 06/16/2021    Tonsil pain 05/18/2021    Healthcare maintenance 05/18/2021    Stage 3b chronic kidney disease (HCC) 06/07/2019    Pulmonary hypertension (HCC)  07/31/2018    Risk for falls 04/18/2017    Osteopenia 11/01/2016    CAD - Coronary artery disease involving native coronary artery of native heart without angina pectoris 04/28/2016    Dyslipidemia 09/23/2014    COPD (chronic obstructive pulmonary disease) ([facility]) 09/23/2014    Essential hypertension 09/23/2014    Hx of CABG 09/23/2014    Chronic diastolic congestive heart failure (HCC) 09/10/2014     Current Outpatient Medications   Medication Sig Dispense Refill    azithromycin (ZITHROMAX) 250 MG Tab Take 2 Tablets by mouth every day for 1 day, THEN 1 Tablet every day for 4 days. 6 Tablet 0    benzonatate (TESSALON) 100 MG Cap Take 1 Capsule by mouth 3 times a day as needed for Cough. 60 Capsule 0    fluticasone-salmeterol (WIXELA INHUB) 250-50 MCG/ACT AEROSOL POWDER, BREATH ACTIVATED Inhale 1 Puff every 12 hours. 180 Each 3    Cholecalciferol 2000 UNIT Cap Take 5,000 Units by mouth. 3 times a week (M,W,F)      metoprolol tartrate (LOPRESSOR) 25 MG Tab Take 1 Tablet by mouth 2 times a day. 180 Tablet 3    lisinopril (PRINIVIL) 40 MG tablet Take 1 Tablet by mouth every day. 90 Tablet 3    atorvastatin (LIPITOR) 80 MG tablet Take 1 Tablet by mouth every evening. 90 Tablet 3    furosemide (LASIX) 20 MG Tab Take 1 Tablet by mouth every day. 90 Tablet 2    omeprazole (PRILOSEC) 20 MG delayed-release capsule TAKE 1 CAPSULE BY MOUTH EVERY DAY (Patient taking differently: Take 20 mg by mouth. 3 times a week (M,W,F)) 90 Capsule 1    albuterol 108 (90 Base) MCG/ACT Aero Soln inhalation aerosol Inhale 2 Puffs every four hours as needed (Emergency Rescue use for - Shortness of Breath, or Wheezing.). 1 Each 3    Probiotic Product (PROBIOTIC DAILY PO) Take  by mouth.      aspirin EC (ECOTRIN) 81 MG TBEC Take 1 Tab by mouth every day. 90 Tab 3     No current facility-administered medications for this visit.     Allergies as of 12/28/2022 - Reviewed 12/28/2022   Allergen Reaction Noted    Iodine Itching 09/08/2014     Oxycodone-acetaminophen Hives 09/08/2014    Sulfa drugs Vomiting 09/08/2014    Food  01/08/2020    Nsaids  08/18/2020      ROS:  See HPI    BP (!) 144/60 (BP Location: Left arm, Patient Position: Sitting, BP Cuff Size: Large adult)   Pulse 93   Temp 36.6 °C (97.8 °F) (Temporal)   Resp 16   Ht 1.524 m (5')   Wt 73.5 kg (162 lb)   SpO2 95%   BMI 31.64 kg/m²     Physical Exam:  General: Normal appearing. No distress.  HEENT: Normocephalic. Eyes conjunctiva clear lids without ptosis, pupils equal and reactive to light accommodation, ears normal shape and contour, canals are clear bilaterally, tympanic membranes are benign, nasal mucosa benign, oropharynx is without erythema, edema or exudates.   Pulmonary: Congested cough, crackles and wheezing throughout.  Increased work of breathing.   Cardiovascular: Regular rate and rhythm without murmur. Carotid and radial pulses are intact and equal bilaterally.  Neurologic: Grossly nonfocal.  Lymph: No cervical, supraclavicular or axillary lymph nodes are palpable.  Skin: Warm and dry.  No obvious lesions.  Musculoskeletal: Normal gait. No extremity cyanosis, clubbing, or edema.  Psych: Normal mood and affect. Alert and oriented x3. Judgment and insight is normal.     Assessment and Plan.   82 y.o. female with the following issues:  1. Panlobular emphysema (HCC)  2. URI, acute  New to examiner, ongoing for the patient since 12/17/2022.  Patient now has a productive cough.  X-ray in the emergency room on 12/24/2022 showed possible developing pneumonia or pneumonitis.  Plan for patient to start azithromycin 500 mg on day 1 and 250 mg once daily for 4 days.  Also may take benzonatate 100 mg 3 times daily as needed for cough.  Close follow-up recommended with PCP, scheduled 1/6/2023 for reevaluation.  Be seen in the emergency room with worsening of symptoms, shortness of breath, or other concerning symptoms.  Continue to follow with nephrology.  Continue furosemide 40 mg once  daily 2 days/week and 20 mg once daily 5 days/week.  - azithromycin (ZITHROMAX) 250 MG Tab; Take 2 Tablets by mouth every day for 1 day, THEN 1 Tablet every day for 4 days.  Dispense: 6 Tablet; Refill: 0  - benzonatate (TESSALON) 100 MG Cap; Take 1 Capsule by mouth 3 times a day as needed for Cough.  Dispense: 60 Capsule; Refill: 0    Return in about 9 days (around 1/6/2023) for Follow-up - antibiotics URI.     Please note that this dictation was created using voice recognition software. I have worked with consultants from the vendor as well as technical experts from [facility] to optimize the interface. I have made every reasonable attempt to correct obvious errors, but I expect that there are errors of grammar and possibly content that I did not discover before finalizing the note.

## 2023-01-06 ENCOUNTER — OFFICE VISIT (OUTPATIENT)
Dept: MEDICAL GROUP | Facility: PHYSICIAN GROUP | Age: 83
End: 2023-01-06
Payer: MEDICARE

## 2023-01-06 ENCOUNTER — HOSPITAL ENCOUNTER (OUTPATIENT)
Dept: RADIOLOGY | Facility: MEDICAL CENTER | Age: 83
End: 2023-01-06
Attending: NURSE PRACTITIONER
Payer: MEDICARE

## 2023-01-06 VITALS
SYSTOLIC BLOOD PRESSURE: 122 MMHG | OXYGEN SATURATION: 91 % | HEART RATE: 100 BPM | TEMPERATURE: 97.6 F | DIASTOLIC BLOOD PRESSURE: 58 MMHG

## 2023-01-06 DIAGNOSIS — K21.9 GASTROESOPHAGEAL REFLUX DISEASE WITHOUT ESOPHAGITIS: ICD-10-CM

## 2023-01-06 DIAGNOSIS — J18.9 PNEUMONIA OF BOTH LOWER LOBES DUE TO INFECTIOUS ORGANISM: ICD-10-CM

## 2023-01-06 DIAGNOSIS — J02.9 SORE THROAT: ICD-10-CM

## 2023-01-06 DIAGNOSIS — R05.1 ACUTE COUGH: ICD-10-CM

## 2023-01-06 LAB
INT CON NEG: NEGATIVE
INT CON POS: POSITIVE
S PYO AG THROAT QL: NORMAL

## 2023-01-06 PROCEDURE — 99214 OFFICE O/P EST MOD 30 MIN: CPT | Performed by: NURSE PRACTITIONER

## 2023-01-06 PROCEDURE — 87880 STREP A ASSAY W/OPTIC: CPT | Performed by: NURSE PRACTITIONER

## 2023-01-06 PROCEDURE — 71046 X-RAY EXAM CHEST 2 VIEWS: CPT

## 2023-01-06 RX ORDER — DOXYCYCLINE HYCLATE 100 MG
100 TABLET ORAL 2 TIMES DAILY
Qty: 14 TABLET | Refills: 0 | Status: SHIPPED | OUTPATIENT
Start: 2023-01-06 | End: 2023-01-13

## 2023-01-06 RX ORDER — AMOXICILLIN AND CLAVULANATE POTASSIUM 875; 125 MG/1; MG/1
1 TABLET, FILM COATED ORAL 2 TIMES DAILY
Qty: 14 TABLET | Refills: 0 | Status: SHIPPED | OUTPATIENT
Start: 2023-01-06 | End: 2023-01-06

## 2023-01-06 RX ORDER — OMEPRAZOLE 20 MG/1
20 CAPSULE, DELAYED RELEASE ORAL
Qty: 90 CAPSULE | Refills: 1 | Status: SHIPPED | OUTPATIENT
Start: 2023-01-06 | End: 2023-07-11

## 2023-01-06 NOTE — PROGRESS NOTES
Subjective:     CC:   Chief Complaint   Patient presents with    Follow-Up     Antibiotics and URI        HPI:   Patient is a 82 y.o. established female patient with medical history listed below here today for evaluation and management of cough, sore throat.  - was seen in ER 12/24/2022 for productive cough, sore throat, conjuctivitis. Cxray on that day showed increase in basilar pulmonary opacifications. She was presumed to have viral infection and discharged same day with gentamicin ophthalmic eyedrops.   - she was seen in clinic on 12/28/2022 with unresolved productive cough and completed azithromycin pack.   - today conjunctivitis has cleared up. She still has mild cough through out the day, sore throat is about the same. Denies fever, CP, palpitations, dizziness, N/V/D.    Hx COPD on Wixela BID, CHF on lasix 20mg 5days & 40mg 2days of the week. Sees nephrology for CKD.     She is also requesting refill of Omeprazole for GERD.    Patient Active Problem List   Diagnosis    Chronic diastolic congestive heart failure (HCC)    Dyslipidemia    COPD (chronic obstructive pulmonary disease) (HCC)    Essential hypertension    Hx of CABG    CAD - Coronary artery disease involving native coronary artery of native heart without angina pectoris    Osteopenia    Risk for falls    Pulmonary hypertension (HCC)    Stage 3b chronic kidney disease (HCC)    Tonsil pain    Healthcare maintenance    Prediabetes    Muscle cramping    Acute cystitis without hematuria    Gastroesophageal reflux disease without esophagitis       Past Medical History:   Diagnosis Date    Anemia     Anxiety     Arrhythmia     Arthritis     Blood transfusion, without reported diagnosis     CHF (congestive heart failure) (HCC)     Chronic airway obstruction, not elsewhere classified     Depression     Emphysema     GERD (gastroesophageal reflux disease)     Headache, classical migraine     Heart attack (HCC)     Heart murmur     Hyperlipidemia      Hypertension     IBD (inflammatory bowel disease)     Kidney disease     Ulcer     Unspecified cataract         Past Surgical History:   Procedure Laterality Date    MULTIPLE CORONARY ARTERY BYPASS ENDO VEIN HARVEST  9/1/2014    Performed by Stephen Nowak M.D. at [facility]    BLADDER NECK CONTRACTURE EXICISION  1970    GANGLION EXCISION  1969    left wrist    ABDOMINAL EXPLORATION      ABDOMINAL HYSTERECTOMY TOTAL      fibroids    APPENDECTOMY      DENTAL EXTRACTION(S)          Current Outpatient Medications on File Prior to Visit   Medication Sig Dispense Refill    benzonatate (TESSALON) 100 MG Cap Take 1 Capsule by mouth 3 times a day as needed for Cough. 60 Capsule 0    fluticasone-salmeterol (WIXELA INHUB) 250-50 MCG/ACT AEROSOL POWDER, BREATH ACTIVATED Inhale 1 Puff every 12 hours. 180 Each 3    metoprolol tartrate (LOPRESSOR) 25 MG Tab Take 1 Tablet by mouth 2 times a day. 180 Tablet 3    lisinopril (PRINIVIL) 40 MG tablet Take 1 Tablet by mouth every day. 90 Tablet 3    atorvastatin (LIPITOR) 80 MG tablet Take 1 Tablet by mouth every evening. 90 Tablet 3    furosemide (LASIX) 20 MG Tab Take 1 Tablet by mouth every day. 90 Tablet 2    albuterol 108 (90 Base) MCG/ACT Aero Soln inhalation aerosol Inhale 2 Puffs every four hours as needed (Emergency Rescue use for - Shortness of Breath, or Wheezing.). 1 Each 3    Probiotic Product (PROBIOTIC DAILY PO) Take  by mouth.      aspirin EC (ECOTRIN) 81 MG TBEC Take 1 Tab by mouth every day. 90 Tab 3    Cholecalciferol 2000 UNIT Cap Take 5,000 Units by mouth. 3 times a week (M,W,F) (Patient not taking: Reported on 1/6/2023)       No current facility-administered medications on file prior to visit.        Objective:     Exam:  /58 (BP Location: Left arm, Patient Position: Sitting, BP Cuff Size: Adult)   Pulse 100   Temp 36.4 °C (97.6 °F) (Temporal)   SpO2 91%  There is no height or weight on file to calculate BMI.    Physical Exam  Constitutional:        Appearance: Normal appearance.   Cardiovascular:      Rate and Rhythm: Normal rate and regular rhythm.      Pulses: Normal pulses.      Heart sounds: Normal heart sounds.   Pulmonary:      Effort: Pulmonary effort is normal.      Breath sounds: Normal breath sounds.   Musculoskeletal:      Cervical back: Normal range of motion and neck supple.   Skin:     General: Skin is warm and dry.   Neurological:      General: No focal deficit present.      Mental Status: She is alert and oriented to person, place, and time.       Assessment & Plan:     82 y.o. female with the following -   1. Sore throat  Negative rapid strep swap today in clinic. See management for PNA below.  - POCT Rapid Strep A    2. Acute cough  Cxray today shows decreased bibasilar opacities likely representing improving pneumonia with minimal residual areas of scarring or atelectasis. No acute cardiopulmonary process. See management for PNA below  - DX-CHEST-2 VIEWS; Future    3. Pneumonia of both lower lobes due to infectious organism  Symptoms since 12/24/2022; cough slightly improved, sore throat about the same. BS are clear on exam today. Will extend abx with doxycycline for another week (would have preferred augmentin but pt states allergic to amoxicillin). Continue Wixela inhaler for COPD. Prn tessalon for cough has not been helpful to her. Can use her albuterol inhaler for exacerbation of cough.   - doxycycline (VIBRAMYCIN) 100 MG Tab; Take 1 Tablet by mouth 2 times a day for 7 days.  Dispense: 14 Tablet; Refill: 0    4. Gastroesophageal reflux disease without esophagitis  Symptoms managed well with prn use of Omeprazole; needing refill today. She knows to avoid greasy/spicy foods, avoid eating too late in the day or close to bedtime.   - omeprazole (PRILOSEC) 20 MG delayed-release capsule; Take 1 Capsule by mouth every day.  Dispense: 90 Capsule; Refill: 1    Educated in proper administration of medication(s) ordered today including  safety, possible SE, risks, benefits, rationale and alternatives to therapy.     Return in about 2 weeks (around 1/20/2023) for cough follow up.    Please note that this dictation was created using voice recognition software. I have made every reasonable attempt to correct obvious errors, but I expect that there are errors of grammar and possibly content that I did not discover before finalizing the note.

## 2023-01-09 PROBLEM — K21.9 GASTROESOPHAGEAL REFLUX DISEASE WITHOUT ESOPHAGITIS: Status: ACTIVE | Noted: 2023-01-09

## 2023-01-20 ENCOUNTER — APPOINTMENT (OUTPATIENT)
Dept: MEDICAL GROUP | Facility: PHYSICIAN GROUP | Age: 83
End: 2023-01-20
Payer: MEDICARE

## 2023-01-25 ENCOUNTER — OFFICE VISIT (OUTPATIENT)
Dept: MEDICAL GROUP | Facility: PHYSICIAN GROUP | Age: 83
End: 2023-01-25
Payer: MEDICARE

## 2023-01-25 VITALS
BODY MASS INDEX: 31.02 KG/M2 | TEMPERATURE: 98.6 F | SYSTOLIC BLOOD PRESSURE: 126 MMHG | DIASTOLIC BLOOD PRESSURE: 84 MMHG | WEIGHT: 158 LBS | HEART RATE: 88 BPM | RESPIRATION RATE: 14 BRPM | OXYGEN SATURATION: 94 % | HEIGHT: 60 IN

## 2023-01-25 DIAGNOSIS — J18.9 PNEUMONIA OF BOTH LOWER LOBES DUE TO INFECTIOUS ORGANISM: ICD-10-CM

## 2023-01-25 DIAGNOSIS — I50.32 CHRONIC DIASTOLIC CONGESTIVE HEART FAILURE (HCC): ICD-10-CM

## 2023-01-25 DIAGNOSIS — R05.1 ACUTE COUGH: ICD-10-CM

## 2023-01-25 PROCEDURE — 99214 OFFICE O/P EST MOD 30 MIN: CPT | Performed by: NURSE PRACTITIONER

## 2023-01-25 ASSESSMENT — FIBROSIS 4 INDEX: FIB4 SCORE: 1.2

## 2023-01-25 ASSESSMENT — PATIENT HEALTH QUESTIONNAIRE - PHQ9: CLINICAL INTERPRETATION OF PHQ2 SCORE: 0

## 2023-01-25 NOTE — PROGRESS NOTES
Rx Refill Note    Requested Prescriptions     Pending Prescriptions Disp Refills   • valsartan (DIOVAN) 320 MG tablet [Pharmacy Med Name: VALSARTAN 320 MG TABLET] 30 tablet 0     Sig: TAKE ONE TABLET BY MOUTH DAILY        Last office visit with prescribing clinician: 10/31/2022      Next office visit with prescribing clinician: 1/16/2023   Last labs:   Last refill: 10/31/2022   Pharmacy (be sure to add in Epic). correct               Subjective:     CC:   Chief Complaint   Patient presents with    Follow-Up     Cough is better, walking long distanced is hard to breathe.         HPI:   Patient is a 82 y.o. established female patient with medical history listed below here today for evaluation and management of cough, sore throat.  - was seen in ER 12/24/2022 for productive cough, sore throat, conjuctivitis. Cxray on that day showed increase in basilar pulmonary opacifications. She was presumed to have viral infection and discharged same day with gentamicin ophthalmic eyedrops.   - she was seen in clinic on 12/28/2022 with unresolved productive cough and completed azithromycin pack.   - I saw her for follow up on 1/6/2023. At that time conjunctivitis had cleared up. She still had mild cough through out the day, sore throat was about the same. STREP was negative. Xray that day showed improving pneumonia with minimal residual areas of scarring or atelectasis. I extended antibiotic coverage with doxycycline for another week.  - today states cough and sore throat have resolved. SOB is much improved. Denies fever, CP, palpitations, dizziness, N/V/D. She has chronic edema in her legs (worse on left)    Hx COPD on Wixela BID, CHF on lasix 20mg 5days & 40mg 2days of the week. Sees nephrology for CKD.     Patient Active Problem List   Diagnosis    Chronic diastolic congestive heart failure (HCC)    Dyslipidemia    COPD (chronic obstructive pulmonary disease) (HCC)    Essential hypertension    Hx of CABG    CAD - Coronary artery disease involving native coronary artery of native heart without angina pectoris    Osteopenia    Risk for falls    Pulmonary hypertension (HCC)    Stage 3b chronic kidney disease (HCC)    Tonsil pain    Healthcare maintenance    Prediabetes    Muscle cramping    Acute cystitis without hematuria    Gastroesophageal reflux disease without esophagitis       Past Medical History:   Diagnosis Date    Anemia     Anxiety     Arrhythmia      Arthritis     Blood transfusion, without reported diagnosis     CHF (congestive heart failure) (HCC)     Chronic airway obstruction, not elsewhere classified     Depression     Emphysema     GERD (gastroesophageal reflux disease)     Headache, classical migraine     Heart attack (HCC)     Heart murmur     Hyperlipidemia     Hypertension     IBD (inflammatory bowel disease)     Kidney disease     Ulcer     Unspecified cataract         Past Surgical History:   Procedure Laterality Date    MULTIPLE CORONARY ARTERY BYPASS ENDO VEIN HARVEST  9/1/2014    Performed by Stephen Nowak M.D. at [facility]    BLADDER NECK CONTRACTURE EXICISION  1970    GANGLION EXCISION  1969    left wrist    ABDOMINAL EXPLORATION      ABDOMINAL HYSTERECTOMY TOTAL      fibroids    APPENDECTOMY      DENTAL EXTRACTION(S)          Current Outpatient Medications on File Prior to Visit   Medication Sig Dispense Refill    omeprazole (PRILOSEC) 20 MG delayed-release capsule Take 1 Capsule by mouth every day. 90 Capsule 1    benzonatate (TESSALON) 100 MG Cap Take 1 Capsule by mouth 3 times a day as needed for Cough. 60 Capsule 0    fluticasone-salmeterol (WIXELA INHUB) 250-50 MCG/ACT AEROSOL POWDER, BREATH ACTIVATED Inhale 1 Puff every 12 hours. 180 Each 3    Cholecalciferol 2000 UNIT Cap Take 5,000 Units by mouth. 3 times a week (M,W,F)      metoprolol tartrate (LOPRESSOR) 25 MG Tab Take 1 Tablet by mouth 2 times a day. 180 Tablet 3    lisinopril (PRINIVIL) 40 MG tablet Take 1 Tablet by mouth every day. 90 Tablet 3    atorvastatin (LIPITOR) 80 MG tablet Take 1 Tablet by mouth every evening. 90 Tablet 3    furosemide (LASIX) 20 MG Tab Take 1 Tablet by mouth every day. 90 Tablet 2    albuterol 108 (90 Base) MCG/ACT Aero Soln inhalation aerosol Inhale 2 Puffs every four hours as needed (Emergency Rescue use for - Shortness of Breath, or Wheezing.). 1 Each 3    Probiotic Product (PROBIOTIC DAILY PO) Take  by mouth.      aspirin EC (ECOTRIN)  81 MG TBEC Take 1 Tab by mouth every day. 90 Tab 3     No current facility-administered medications on file prior to visit.        Objective:     Exam:  /84   Pulse 88   Temp 37 °C (98.6 °F) (Tympanic)   Resp 14   Ht 1.524 m (5')   Wt 71.7 kg (158 lb)   SpO2 94%   BMI 30.86 kg/m²  Body mass index is 30.86 kg/m².    Physical Exam  Constitutional:       Appearance: Normal appearance.   Cardiovascular:      Rate and Rhythm: Normal rate and regular rhythm.      Pulses: Normal pulses.      Heart sounds: Normal heart sounds.   Pulmonary:      Effort: Pulmonary effort is normal.      Breath sounds: Normal breath sounds.   Musculoskeletal:      Right lower leg: Edema present.      Left lower leg: Edema present.      Comments: Ambulates with cane, wheelchair assistance for long distance   Skin:     General: Skin is warm and dry.   Neurological:      General: No focal deficit present.      Mental Status: She is alert and oriented to person, place, and time.       Assessment & Plan:     82 y.o. female with the following -   1. Acute cough  2. Pneumonia of both lower lobes due to infectious organism  Completed 7 day course of doxycycline, Symptoms have improved since 12/24/2022; states cough & sore throat have resolved. BS are clear on exam today. Continue Wixela inhaler for COPD; prn albuterol for SOB.     3. Chronic diastolic congestive heart failure (HCC)  Chronic condition; increased LE edema the past few weeks; no changes in activity level, no additional dyspnea. Denies CP, palpitations, dizziness.  - can increase Lasix to 40mg QD x 3 days and then go back to 20mg QD. Can repeat this again in a week if still with increased edema.   - continue Metoprolol 25mg QD  - counseled on low salt diet, especially around the holidays  - monitor CMP Q 6 months    Educated in proper administration of medication(s) ordered today including safety, possible SE, risks, benefits, rationale and alternatives to therapy.     Return  in about 3 months (around 4/25/2023) for Medicare Annual Visit.    Please note that this dictation was created using voice recognition software. I have made every reasonable attempt to correct obvious errors, but I expect that there are errors of grammar and possibly content that I did not discover before finalizing the note.

## 2023-01-26 NOTE — ASSESSMENT & PLAN NOTE
Chronic condition; increased LE edema the past few weeks; no changes in activity level, no additional dyspnea. Denies CP, palpitations, dizziness.  - can increase Lasix to 40mg QD x 3 days and then go back to 20mg QD. Can repeat this again in a week if still with increased edema.   - continue Metoprolol 25mg QD  - counseled on low salt diet, especially around the holidays  - monitor CMP Q 6 months

## 2023-02-04 DIAGNOSIS — R60.9 EDEMA, UNSPECIFIED TYPE: ICD-10-CM

## 2023-02-04 DIAGNOSIS — I10 ESSENTIAL HYPERTENSION: Chronic | ICD-10-CM

## 2023-02-06 RX ORDER — FUROSEMIDE 20 MG/1
20 TABLET ORAL DAILY
Qty: 90 TABLET | Refills: 2 | Status: SHIPPED | OUTPATIENT
Start: 2023-02-06 | End: 2023-06-09

## 2023-05-30 ENCOUNTER — HOSPITAL ENCOUNTER (OUTPATIENT)
Dept: LAB | Facility: MEDICAL CENTER | Age: 83
End: 2023-05-30
Attending: INTERNAL MEDICINE
Payer: MEDICARE

## 2023-05-30 DIAGNOSIS — N18.32 STAGE 3B CHRONIC KIDNEY DISEASE: ICD-10-CM

## 2023-05-30 DIAGNOSIS — E21.3 HYPERPARATHYROIDISM (HCC): ICD-10-CM

## 2023-05-30 DIAGNOSIS — I10 ESSENTIAL HYPERTENSION: Chronic | ICD-10-CM

## 2023-05-30 LAB
25(OH)D3 SERPL-MCNC: 45 NG/ML (ref 30–100)
ALBUMIN SERPL BCP-MCNC: 4.1 G/DL (ref 3.2–4.9)
ANION GAP SERPL CALC-SCNC: 12 MMOL/L (ref 7–16)
APPEARANCE UR: ABNORMAL
BACTERIA #/AREA URNS HPF: ABNORMAL /HPF
BILIRUB UR QL STRIP.AUTO: NEGATIVE
BUN SERPL-MCNC: 29 MG/DL (ref 8–22)
CALCIUM SERPL-MCNC: 9.1 MG/DL (ref 8.5–10.5)
CHLORIDE SERPL-SCNC: 104 MMOL/L (ref 96–112)
CO2 SERPL-SCNC: 24 MMOL/L (ref 20–33)
COLOR UR: YELLOW
CREAT SERPL-MCNC: 1.31 MG/DL (ref 0.5–1.4)
CREAT UR-MCNC: 48.05 MG/DL
CREAT UR-MCNC: 49.38 MG/DL
EPI CELLS #/AREA URNS HPF: ABNORMAL /HPF
ERYTHROCYTE [DISTWIDTH] IN BLOOD BY AUTOMATED COUNT: 54.8 FL (ref 35.9–50)
GFR SERPLBLD CREATININE-BSD FMLA CKD-EPI: 40 ML/MIN/1.73 M 2
GLUCOSE SERPL-MCNC: 102 MG/DL (ref 65–99)
GLUCOSE UR STRIP.AUTO-MCNC: NEGATIVE MG/DL
HCT VFR BLD AUTO: 33 % (ref 37–47)
HGB BLD-MCNC: 10.5 G/DL (ref 12–16)
HYALINE CASTS #/AREA URNS LPF: ABNORMAL /LPF
KETONES UR STRIP.AUTO-MCNC: NEGATIVE MG/DL
LEUKOCYTE ESTERASE UR QL STRIP.AUTO: ABNORMAL
MCH RBC QN AUTO: 28.6 PG (ref 27–33)
MCHC RBC AUTO-ENTMCNC: 31.8 G/DL (ref 32.2–35.5)
MCV RBC AUTO: 89.9 FL (ref 81.4–97.8)
MICRO URNS: ABNORMAL
MICROALBUMIN UR-MCNC: 1.7 MG/DL
MICROALBUMIN/CREAT UR: 34 MG/G (ref 0–30)
NITRITE UR QL STRIP.AUTO: NEGATIVE
PH UR STRIP.AUTO: 6 [PH] (ref 5–8)
PHOSPHATE SERPL-MCNC: 3.9 MG/DL (ref 2.5–4.5)
PLATELET # BLD AUTO: 330 K/UL (ref 164–446)
PMV BLD AUTO: 10.4 FL (ref 9–12.9)
POTASSIUM SERPL-SCNC: 4.9 MMOL/L (ref 3.6–5.5)
PROT UR QL STRIP: NEGATIVE MG/DL
PROT UR-MCNC: 11 MG/DL (ref 0–15)
PROT/CREAT UR: 229 MG/G (ref 10–107)
PTH-INTACT SERPL-MCNC: 129 PG/ML (ref 14–72)
RBC # BLD AUTO: 3.67 M/UL (ref 4.2–5.4)
RBC # URNS HPF: ABNORMAL /HPF
RBC UR QL AUTO: ABNORMAL
SODIUM SERPL-SCNC: 140 MMOL/L (ref 135–145)
SP GR UR STRIP.AUTO: 1.01
UROBILINOGEN UR STRIP.AUTO-MCNC: 0.2 MG/DL
WBC # BLD AUTO: 9.2 K/UL (ref 4.8–10.8)
WBC #/AREA URNS HPF: ABNORMAL /HPF

## 2023-05-30 PROCEDURE — 84156 ASSAY OF PROTEIN URINE: CPT

## 2023-05-30 PROCEDURE — 87186 SC STD MICRODIL/AGAR DIL: CPT

## 2023-05-30 PROCEDURE — 80048 BASIC METABOLIC PNL TOTAL CA: CPT

## 2023-05-30 PROCEDURE — 83970 ASSAY OF PARATHORMONE: CPT

## 2023-05-30 PROCEDURE — 82306 VITAMIN D 25 HYDROXY: CPT

## 2023-05-30 PROCEDURE — 87086 URINE CULTURE/COLONY COUNT: CPT

## 2023-05-30 PROCEDURE — 85027 COMPLETE CBC AUTOMATED: CPT

## 2023-05-30 PROCEDURE — 82043 UR ALBUMIN QUANTITATIVE: CPT

## 2023-05-30 PROCEDURE — 87077 CULTURE AEROBIC IDENTIFY: CPT

## 2023-05-30 PROCEDURE — 82040 ASSAY OF SERUM ALBUMIN: CPT

## 2023-05-30 PROCEDURE — 84100 ASSAY OF PHOSPHORUS: CPT

## 2023-05-30 PROCEDURE — 81001 URINALYSIS AUTO W/SCOPE: CPT

## 2023-05-30 PROCEDURE — 36415 COLL VENOUS BLD VENIPUNCTURE: CPT

## 2023-05-30 PROCEDURE — 82570 ASSAY OF URINE CREATININE: CPT | Mod: 91

## 2023-06-08 ENCOUNTER — OFFICE VISIT (OUTPATIENT)
Dept: MEDICAL GROUP | Facility: PHYSICIAN GROUP | Age: 83
End: 2023-06-08
Payer: MEDICARE

## 2023-06-08 VITALS
BODY MASS INDEX: 29.06 KG/M2 | WEIGHT: 148 LBS | OXYGEN SATURATION: 97 % | DIASTOLIC BLOOD PRESSURE: 62 MMHG | TEMPERATURE: 125.6 F | HEIGHT: 60 IN | SYSTOLIC BLOOD PRESSURE: 102 MMHG | HEART RATE: 54 BPM

## 2023-06-08 DIAGNOSIS — R73.03 PREDIABETES: ICD-10-CM

## 2023-06-08 DIAGNOSIS — E78.5 DYSLIPIDEMIA: ICD-10-CM

## 2023-06-08 DIAGNOSIS — Z91.81 RISK FOR FALLS: ICD-10-CM

## 2023-06-08 DIAGNOSIS — N18.32 STAGE 3B CHRONIC KIDNEY DISEASE: ICD-10-CM

## 2023-06-08 DIAGNOSIS — N30.00 ACUTE CYSTITIS WITHOUT HEMATURIA: ICD-10-CM

## 2023-06-08 LAB
APPEARANCE UR: NORMAL
BILIRUB UR STRIP-MCNC: NEGATIVE MG/DL
COLOR UR AUTO: NORMAL
GLUCOSE UR STRIP.AUTO-MCNC: NEGATIVE MG/DL
HBA1C MFR BLD: 6.2 % (ref ?–5.8)
KETONES UR STRIP.AUTO-MCNC: NEGATIVE MG/DL
LEUKOCYTE ESTERASE UR QL STRIP.AUTO: NORMAL
NITRITE UR QL STRIP.AUTO: POSITIVE
PH UR STRIP.AUTO: 5.5 [PH] (ref 5–8)
POCT INT CON NEG: NEGATIVE
POCT INT CON POS: POSITIVE
PROT UR QL STRIP: NEGATIVE MG/DL
RBC UR QL AUTO: NORMAL
SP GR UR STRIP.AUTO: 1.02
UROBILINOGEN UR STRIP-MCNC: NORMAL MG/DL

## 2023-06-08 PROCEDURE — 99214 OFFICE O/P EST MOD 30 MIN: CPT | Performed by: NURSE PRACTITIONER

## 2023-06-08 PROCEDURE — 83036 HEMOGLOBIN GLYCOSYLATED A1C: CPT | Performed by: NURSE PRACTITIONER

## 2023-06-08 PROCEDURE — 3078F DIAST BP <80 MM HG: CPT | Performed by: NURSE PRACTITIONER

## 2023-06-08 PROCEDURE — 3074F SYST BP LT 130 MM HG: CPT | Performed by: NURSE PRACTITIONER

## 2023-06-08 PROCEDURE — 81002 URINALYSIS NONAUTO W/O SCOPE: CPT | Performed by: NURSE PRACTITIONER

## 2023-06-08 RX ORDER — CEFDINIR 300 MG/1
300 CAPSULE ORAL 2 TIMES DAILY
Qty: 20 CAPSULE | Refills: 0 | Status: SHIPPED | OUTPATIENT
Start: 2023-06-08 | End: 2023-06-18

## 2023-06-08 ASSESSMENT — ENCOUNTER SYMPTOMS
CONSTITUTIONAL NEGATIVE: 1
SHORTNESS OF BREATH: 0
MUSCULOSKELETAL NEGATIVE: 1
COUGH: 0
PSYCHIATRIC NEGATIVE: 1
PALPITATIONS: 0
EYES NEGATIVE: 1
SPUTUM PRODUCTION: 0
GASTROINTESTINAL NEGATIVE: 1
FEVER: 0
NEUROLOGICAL NEGATIVE: 1

## 2023-06-08 ASSESSMENT — FIBROSIS 4 INDEX: FIB4 SCORE: 1.33

## 2023-06-08 NOTE — ASSESSMENT & PLAN NOTE
No myalgia; LDL <100; continue plan per cardiology;   - Atorvastatin 80mg QD, daily ASA  - monitor annual fasting lipid, last check in 2021; ordered today

## 2023-06-08 NOTE — PROGRESS NOTES
Subjective       CC:   Chief Complaint   Patient presents with    Follow-Up     Medication questions, for hallucinations.         HPI:   Patient is a 82 y.o. established female patient with medical history listed below here today for evaluation and management of prediabetes, dyslipidemia. We will check her a1c today. Declined medicare visit.   States she has been seeing cats under her bed lately, unsure if its real or hallucinating. Her UA from 5/30/2023 had mod bacteria & large leukocytes, urine culture was +ve. UA today also shows +ve nitrate, will start her on cefdinir (ax to sulfa).     Problem   Prediabetes     Latest Reference Range & Units 05/28/21 08:10 12/01/21 08:21 06/14/22 12:31   Glycohemoglobin 0.0 - 5.6 % 6.5 (H) 6.1 (H) 5.9 !     - diet controlled     Stage 3b Chronic Kidney Disease (Hcc)    GFR 40  Followed by nephrology  BP managed on Lisinopril 40mg QD;  HF managed on Metoprolol 25mg BID, Lasix 20mg QD  Prediabetes is diet controlled     Dyslipidemia    Followed by Cardiology; Hx CAD s/p CABG 2014  Taking Atorvastatin 80mg QD, daily ASA         Patient Active Problem List   Diagnosis    Chronic diastolic congestive heart failure (HCC)    Dyslipidemia    COPD (chronic obstructive pulmonary disease) (HCC)    Essential hypertension    Hx of CABG    CAD - Coronary artery disease involving native coronary artery of native heart without angina pectoris    Osteopenia    Risk for falls    Pulmonary hypertension (HCC)    Stage 3b chronic kidney disease (HCC)    Tonsil pain    Healthcare maintenance    Prediabetes    Muscle cramping    Acute cystitis without hematuria    Gastroesophageal reflux disease without esophagitis       Past Medical History:   Diagnosis Date    Anemia     Anxiety     Arrhythmia     Arthritis     Blood transfusion, without reported diagnosis     CHF (congestive heart failure) (HCC)     Chronic airway obstruction, not elsewhere classified     Depression     Emphysema     GERD  (gastroesophageal reflux disease)     Headache, classical migraine     Heart attack (HCC)     Heart murmur     Hyperlipidemia     Hypertension     IBD (inflammatory bowel disease)     Kidney disease     Ulcer     Unspecified cataract         Past Surgical History:   Procedure Laterality Date    MULTIPLE CORONARY ARTERY BYPASS ENDO VEIN HARVEST  9/1/2014    Performed by Stephen Nowak M.D. at SURGERY [facility] ORS    BLADDER NECK CONTRACTURE EXICISION  1970    GANGLION EXCISION  1969    left wrist    ABDOMINAL EXPLORATION      ABDOMINAL HYSTERECTOMY TOTAL      fibroids    APPENDECTOMY      DENTAL EXTRACTION(S)          Current Outpatient Medications on File Prior to Visit   Medication Sig Dispense Refill    furosemide (LASIX) 20 MG Tab TAKE 1 TABLET BY MOUTH EVERY DAY 90 Tablet 2    omeprazole (PRILOSEC) 20 MG delayed-release capsule Take 1 Capsule by mouth every day. 90 Capsule 1    fluticasone-salmeterol (WIXELA INHUB) 250-50 MCG/ACT AEROSOL POWDER, BREATH ACTIVATED Inhale 1 Puff every 12 hours. 180 Each 3    Cholecalciferol 2000 UNIT Cap Take 5,000 Units by mouth. 3 times a week (M,W,F)      metoprolol tartrate (LOPRESSOR) 25 MG Tab Take 1 Tablet by mouth 2 times a day. 180 Tablet 3    lisinopril (PRINIVIL) 40 MG tablet Take 1 Tablet by mouth every day. 90 Tablet 3    atorvastatin (LIPITOR) 80 MG tablet Take 1 Tablet by mouth every evening. 90 Tablet 3    albuterol 108 (90 Base) MCG/ACT Aero Soln inhalation aerosol Inhale 2 Puffs every four hours as needed (Emergency Rescue use for - Shortness of Breath, or Wheezing.). 1 Each 3    Probiotic Product (PROBIOTIC DAILY PO) Take  by mouth.      aspirin EC (ECOTRIN) 81 MG TBEC Take 1 Tab by mouth every day. 90 Tab 3     No current facility-administered medications on file prior to visit.        ROS:  Review of Systems   Constitutional: Negative.  Negative for fever and malaise/fatigue.   HENT: Negative.     Eyes: Negative.    Respiratory:  Negative for cough, sputum  production and shortness of breath.    Cardiovascular:  Negative for chest pain, palpitations and leg swelling.   Gastrointestinal: Negative.    Genitourinary: Negative.    Musculoskeletal: Negative.         Ambulates with cane   Neurological: Negative.    Endo/Heme/Allergies: Negative.    Psychiatric/Behavioral: Negative.         Objective       Exam:  /62   Pulse (!) 54   Temp (!) 52 °C (125.6 °F) (Temporal)   Ht 1.524 m (5')   Wt 67.1 kg (148 lb)   SpO2 97%   BMI 28.90 kg/m²  Body mass index is 28.9 kg/m².    Physical Exam  Constitutional:       Appearance: Normal appearance.   Cardiovascular:      Rate and Rhythm: Normal rate and regular rhythm.      Pulses: Normal pulses.      Heart sounds: Normal heart sounds.   Pulmonary:      Effort: Pulmonary effort is normal.      Breath sounds: Normal breath sounds.   Musculoskeletal:      Right lower leg: Edema present.      Left lower leg: Edema present.      Comments: Ambulates with cane, wheelchair assistance for long distance   Skin:     General: Skin is warm and dry.   Neurological:      General: No focal deficit present.      Mental Status: She is alert and oriented to person, place, and time.        Latest Reference Range & Units 05/30/23 16:29 06/08/23 14:22   Color  Yellow    Character  Cloudy !    Specific Gravity <1.035  1.010    Ph 5.0 - 8.0  6.0    Glucose Negative mg/dL Negative    Ketones Negative mg/dL Negative    Bilirubin Negative  Negative    Occult Blood Negative  Small !    Protein Negative mg/dL Negative    Nitrite Negative  Negative    Leukocyte Esterase Negative  Large !    Urobilinogen, Urine Negative  0.2    Micro Urine Req  Microscopic    WBC /hpf Packed !    RBC /hpf 0-2    Epithelial Cells /hpf Few    Bacteria None /hpf Moderate !    Hyaline Cast /lpf 0-2    POC Color Negative   light yellow   POC Appearance Negative   cloudy   POC Specific Gravity <1.005 - >1.030   1.020   POC Urine PH 5.0 - 8.0   5.5   POC Glucose Negative mg/dL   negative   POC Ketones Negative mg/dL  negative   POC Protein Negative mg/dL  NEGATIVE   POC Nitrites Negative   POSITIVE   POC Leukocyte Esterase Negative   SMALL*   POC Blood Negative   small*   POC Bilirubin Negative mg/dL  negative   POC Urobiligen Negative (0.2) mg/dL  0.2E.U     Assessment & Plan       82 y.o. female with the following -     Problem List Items Addressed This Visit       Dyslipidemia     No myalgia; LDL <100; continue plan per cardiology;   - Atorvastatin 80mg QD, daily ASA  - monitor annual fasting lipid, last check in 2021; ordered today         Relevant Orders    Lipid Profile    Risk for falls    Relevant Orders    Patient identified as fall risk.  Appropriate orders and counseling given.    Stage 3b chronic kidney disease (HCC)    Prediabetes     A1C today in clinic 6.2 (up slightly from 6.0)  - continue with low sugar, reduced carb intake  - monitor A1C Q 6 months  - declined retinal screen today; we will get it done at next visit         Relevant Orders    POCT  A1C (Completed)    Acute cystitis without hematuria    Relevant Medications    cefdinir (OMNICEF) 300 MG Cap    Other Relevant Orders    POCT Urinalysis (Completed)     HCC Gap Form    Diagnosis to address: J43.1 - Panlobular emphysema (HCC)  Assessment and plan: Chronic, stable. Continue with current defined treatment plan: Switched from Symbicort to Advair last year 2021 due to insurance coverage/cost; states doing well, oxygen level at 96-98% on room air.. Follow-up at least annually.  Diagnosis: N18.32 - Stage 3b chronic kidney disease (HCC)  Assessment and plan: Chronic, stable. Continue with current defined treatment plan: GFR 40  Followed by nephrology. BP managed on Lisinopril 40mg QD; HF managed on Metoprolol 25mg BID, Lasix 20mg QD. Prediabetes is diet controlled. Follow-up at least annually.  Last edited 06/08/23 16:29 PDT by KOREY MaravillaN.P.       Medications Prescribed Today:  3. Acute cystitis without  hematuria  - cefdinir (OMNICEF) 300 MG Cap; Take 1 Capsule by mouth 2 times a day for 10 days.  Dispense: 20 Capsule; Refill: 0    Educated in proper administration of medication(s) ordered today including safety, possible SE, risks, benefits, rationale and alternatives to therapy.     Return in about 5 months (around 11/8/2023).    Please note that this dictation was created using voice recognition software. I have made every reasonable attempt to correct obvious errors, but I expect that there are errors of grammar and possibly content that I did not discover before finalizing the note.

## 2023-06-08 NOTE — ASSESSMENT & PLAN NOTE
A1C today in clinic 6.2 (up slightly from 6.0)  - continue with low sugar, reduced carb intake  - monitor A1C Q 6 months  - declined retinal screen today; we will get it done at next visit

## 2023-06-09 ENCOUNTER — OFFICE VISIT (OUTPATIENT)
Dept: NEPHROLOGY | Facility: MEDICAL CENTER | Age: 83
End: 2023-06-09
Payer: MEDICARE

## 2023-06-09 VITALS
SYSTOLIC BLOOD PRESSURE: 114 MMHG | TEMPERATURE: 98.2 F | HEIGHT: 57 IN | HEART RATE: 56 BPM | BODY MASS INDEX: 31.93 KG/M2 | WEIGHT: 148 LBS | DIASTOLIC BLOOD PRESSURE: 60 MMHG | OXYGEN SATURATION: 96 %

## 2023-06-09 DIAGNOSIS — J43.1 PANLOBULAR EMPHYSEMA (HCC): ICD-10-CM

## 2023-06-09 DIAGNOSIS — D64.9 ANEMIA, UNSPECIFIED TYPE: ICD-10-CM

## 2023-06-09 DIAGNOSIS — N25.81 SECONDARY HYPERPARATHYROIDISM (HCC): ICD-10-CM

## 2023-06-09 DIAGNOSIS — N18.32 STAGE 3B CHRONIC KIDNEY DISEASE: ICD-10-CM

## 2023-06-09 DIAGNOSIS — I25.10 CORONARY ARTERY DISEASE INVOLVING NATIVE CORONARY ARTERY OF NATIVE HEART WITHOUT ANGINA PECTORIS: ICD-10-CM

## 2023-06-09 DIAGNOSIS — I10 ESSENTIAL HYPERTENSION: ICD-10-CM

## 2023-06-09 DIAGNOSIS — I50.32 CHRONIC DIASTOLIC CONGESTIVE HEART FAILURE (HCC): ICD-10-CM

## 2023-06-09 DIAGNOSIS — N25.81 SECONDARY HYPERPARATHYROIDISM OF RENAL ORIGIN (HCC): ICD-10-CM

## 2023-06-09 DIAGNOSIS — R60.9 EDEMA, UNSPECIFIED TYPE: ICD-10-CM

## 2023-06-09 PROCEDURE — 3078F DIAST BP <80 MM HG: CPT | Performed by: INTERNAL MEDICINE

## 2023-06-09 PROCEDURE — 3074F SYST BP LT 130 MM HG: CPT | Performed by: INTERNAL MEDICINE

## 2023-06-09 PROCEDURE — 99214 OFFICE O/P EST MOD 30 MIN: CPT | Performed by: INTERNAL MEDICINE

## 2023-06-09 RX ORDER — FUROSEMIDE 80 MG
80 TABLET ORAL DAILY
Qty: 90 TABLET | Refills: 3 | Status: SHIPPED | OUTPATIENT
Start: 2023-06-09 | End: 2024-03-05 | Stop reason: SDUPTHER

## 2023-06-09 ASSESSMENT — FIBROSIS 4 INDEX: FIB4 SCORE: 1.33

## 2023-06-09 ASSESSMENT — ENCOUNTER SYMPTOMS
ABDOMINAL PAIN: 0
SHORTNESS OF BREATH: 1
COUGH: 1
FEVER: 1

## 2023-06-09 NOTE — PROGRESS NOTES
Chief Complaint   Patient presents with    Follow-Up    Chronic Kidney Disease       CC: Follow-up CKD    HPI:  Valorie Sierra is a 82 y.o. female with HTN, pre-diabetes, CKD 3B, chronic diastolic heart failure who presents today for follow-up.    Re: HTN, diagnosed 2014 around her CABG surgery. BP control over the years has been well controlled.  She does not check BP at home. Says it's good at doctor's visits.     Re: Chronic diastolic heart failure.  The patient had CABG surgery in September 2014. She still takes lasix 20mg tab, 40mg three times a week and 20mg 4 days a week. Says neither 20 nor 40mg lasix makes her urinate much, but more with 40mg dose. Still complains of LE edema.     Re: CKD. She had mild MELITA (Scr of ~2) when she had CABG in 2014, denies history of MELITA requiring dialysis. Denies NSAIDs. Denies bladder emptying troubles, but does complain of some burning with urination.  She was prescribed cefdinir by primary care, just started taking the medicine yesterday.      Past Medical History:   Diagnosis Date    Anemia     Anxiety     Arrhythmia     Arthritis     Blood transfusion, without reported diagnosis     CHF (congestive heart failure) (HCC)     Chronic airway obstruction, not elsewhere classified     Depression     Emphysema     GERD (gastroesophageal reflux disease)     Headache, classical migraine     Heart attack (HCC)     Heart murmur     Hyperlipidemia     Hypertension     IBD (inflammatory bowel disease)     Kidney disease     Ulcer     Unspecified cataract        Past Surgical History:   Procedure Laterality Date    MULTIPLE CORONARY ARTERY BYPASS ENDO VEIN HARVEST  9/1/2014    Performed by Stephen Nowak M.D. at SURGERY [facility] ORS    BLADDER NECK CONTRACTURE EXICISION  1970    GANGLION EXCISION  1969    left wrist    ABDOMINAL EXPLORATION      ABDOMINAL HYSTERECTOMY TOTAL      fibroids    APPENDECTOMY      DENTAL EXTRACTION(S)          Outpatient Encounter Medications as of  "6/9/2023   Medication Sig Dispense Refill    cefdinir (OMNICEF) 300 MG Cap Take 1 Capsule by mouth 2 times a day for 10 days. 20 Capsule 0    furosemide (LASIX) 20 MG Tab TAKE 1 TABLET BY MOUTH EVERY DAY 90 Tablet 2    omeprazole (PRILOSEC) 20 MG delayed-release capsule Take 1 Capsule by mouth every day. 90 Capsule 1    fluticasone-salmeterol (WIXELA INHUB) 250-50 MCG/ACT AEROSOL POWDER, BREATH ACTIVATED Inhale 1 Puff every 12 hours. 180 Each 3    Cholecalciferol 2000 UNIT Cap Take 5,000 Units by mouth. 3 times a week (M,W,F)      metoprolol tartrate (LOPRESSOR) 25 MG Tab Take 1 Tablet by mouth 2 times a day. 180 Tablet 3    lisinopril (PRINIVIL) 40 MG tablet Take 1 Tablet by mouth every day. 90 Tablet 3    atorvastatin (LIPITOR) 80 MG tablet Take 1 Tablet by mouth every evening. 90 Tablet 3    albuterol 108 (90 Base) MCG/ACT Aero Soln inhalation aerosol Inhale 2 Puffs every four hours as needed (Emergency Rescue use for - Shortness of Breath, or Wheezing.). 1 Each 3    Probiotic Product (PROBIOTIC DAILY PO) Take  by mouth.      aspirin EC (ECOTRIN) 81 MG TBEC Take 1 Tab by mouth every day. 90 Tab 3     No facility-administered encounter medications on file as of 6/9/2023.        Allergies   Allergen Reactions    Iodine Itching    Oxycodone-Acetaminophen Hives    Sulfa Drugs Vomiting    Food      avocado    Nsaids      Pt states she was told to never take these because of her kidneys.             Review of Systems   Constitutional:  Positive for fever.   Respiratory:  Positive for cough and shortness of breath.    Cardiovascular:  Negative for chest pain.   Gastrointestinal:  Negative for abdominal pain.   Genitourinary:  Positive for dysuria.   All other systems reviewed and are negative.      /60 (BP Location: Left arm, Patient Position: Sitting)   Pulse (!) 56   Temp 36.8 °C (98.2 °F) (Temporal)   Ht 1.448 m (4' 9\")   Wt 67.1 kg (148 lb)   SpO2 96%   BMI 32.03 kg/m²     Physical Exam  Constitutional:  "      General: She is not in acute distress.     Appearance: She is obese.   HENT:      Mouth/Throat:      Pharynx: No oropharyngeal exudate.   Eyes:      General: No scleral icterus.  Neck:      Trachea: No tracheal deviation.   Cardiovascular:      Rate and Rhythm: Normal rate and regular rhythm.      Heart sounds: Normal heart sounds. No murmur heard.  Pulmonary:      Effort: Pulmonary effort is normal.      Breath sounds: Normal breath sounds. No stridor. No rales.   Abdominal:      General: Bowel sounds are normal.      Palpations: Abdomen is soft.      Tenderness: There is no abdominal tenderness.   Musculoskeletal:         General: Normal range of motion.      Cervical back: Neck supple.      Right lower leg: Edema (2+ extending to thigh) present.      Left lower leg: Edema (2+ extending to thigh) present.   Skin:     General: Skin is warm and dry.      Findings: No rash.   Neurological:      General: No focal deficit present.      Mental Status: She is alert and oriented to person, place, and time.   Psychiatric:         Mood and Affect: Mood and affect normal.         Behavior: Behavior normal.         Labs reviewed.    Recent Labs     09/30/22  1138 12/24/22  1526 05/30/23  1629   ALBUMIN 4.1 3.5 4.1   SODIUM 143 137 140   POTASSIUM 5.6* 3.8 4.9   CHLORIDE 109 99 104   CO2 25 25 24   BUN 34* 22 29*   CREATININE 1.33 1.22 1.31   PHOSPHORUS 3.9  --  3.9         Lab Results   Component Value Date/Time    WBC 9.2 05/30/2023 04:29 PM    RBC 3.67 (L) 05/30/2023 04:29 PM    HEMOGLOBIN 10.5 (L) 05/30/2023 04:29 PM    HEMATOCRIT 33.0 (L) 05/30/2023 04:29 PM    MCV 89.9 05/30/2023 04:29 PM    MCH 28.6 05/30/2023 04:29 PM    MCHC 31.8 (L) 05/30/2023 04:29 PM    MPV 10.4 05/30/2023 04:29 PM             URINALYSIS:  Lab Results   Component Value Date/Time    COLORURINE Yellow 05/30/2023 1629    CLARITY Cloudy (A) 05/30/2023 1629    SPECGRAVITY 1.010 05/30/2023 1629    PHURINE 6.0 05/30/2023 1629    KETONES Negative  05/30/2023 1629    PROTEINURIN Negative 05/30/2023 1629    BILIRUBINUR Negative 05/30/2023 1629    UROBILU 0.2 05/30/2023 1629    NITRITE Negative 05/30/2023 1629    LEUKESTERAS Large (A) 05/30/2023 1629    OCCULTBLOOD Small (A) 05/30/2023 1629       [facility]  Lab Results   Component Value Date/Time    TOTPROTUR 11.0 05/30/2023 1629      Lab Results   Component Value Date/Time    CREATININEU 48.05 05/30/2023 1629           Imaging reviewed  No orders to display         Assessment:  Valorie Sierra is a 82 y.o. female with HTN, pre-diabetes, CKD 3B, chronic diastolic heart failure who presents today for follow-up.    Plan:  1.  CKD stage IIIb  -Creatinine and GFR stable in CKD stage IIIb category, but she is volume overloaded, so her creatinine might naturally worsen with appropriate diuresis as below.  The patient's underlying CKD likely from hypertension and age-related kidney decline.  I recommend avoid NSAIDs and other nephrotoxins.  I recommend low-salt diet.  I explained the importance of blood pressure control to help slow CKD progression.  Maintain lisinopril for long-term kidney protection.      2. Prediabetes  -I explained to the importance of glycemic control to help slow CKD progression.  If she progresses to diabetes, I would recommend starting SGLT2 inhibitor such as Farxiga 5 mg daily.    3. Essential hypertension  -Controlled.  Continue lisinopril and metoprolol, but adjust Lasix as below.    4. Chronic diastolic congestive heart failure (HCC)  -Patient appears volume overloaded today.  She does not appear to be improving with Lasix 40 mg dose.  I recommend increasing Lasix to 80 mg daily.  I recommend repeat metabolic panel in 1 week to assess electrolytes and creatinine.  I expect her creatinine might worsen slightly with this appropriate diuresis, but the goal is euvolemia, not a normal creatinine.      5.  Secondary hyperparathyroidism, due to CKD and vitamin D deficiency.    -Vitamin D level  currently controlled, continue over-the-counter vitamin D 2000 units daily or 5000 units thrice weekly.  Parathyroid hormone is mildly elevated.  No acute need for vitamin D receptor agonist.     Return to clinic 6 months with preclinic labs    Vitaliy Simon MD  Nephrology

## 2023-06-16 ENCOUNTER — HOSPITAL ENCOUNTER (OUTPATIENT)
Dept: LAB | Facility: MEDICAL CENTER | Age: 83
End: 2023-06-16
Attending: INTERNAL MEDICINE
Payer: MEDICARE

## 2023-06-16 DIAGNOSIS — I10 ESSENTIAL HYPERTENSION: ICD-10-CM

## 2023-06-16 LAB
ANION GAP SERPL CALC-SCNC: 15 MMOL/L (ref 7–16)
BUN SERPL-MCNC: 46 MG/DL (ref 8–22)
CALCIUM SERPL-MCNC: 9.4 MG/DL (ref 8.5–10.5)
CHLORIDE SERPL-SCNC: 103 MMOL/L (ref 96–112)
CO2 SERPL-SCNC: 24 MMOL/L (ref 20–33)
CREAT SERPL-MCNC: 1.63 MG/DL (ref 0.5–1.4)
GFR SERPLBLD CREATININE-BSD FMLA CKD-EPI: 31 ML/MIN/1.73 M 2
GLUCOSE SERPL-MCNC: 89 MG/DL (ref 65–99)
POTASSIUM SERPL-SCNC: 4.9 MMOL/L (ref 3.6–5.5)
SODIUM SERPL-SCNC: 142 MMOL/L (ref 135–145)

## 2023-06-16 PROCEDURE — 36415 COLL VENOUS BLD VENIPUNCTURE: CPT

## 2023-06-16 PROCEDURE — 80048 BASIC METABOLIC PNL TOTAL CA: CPT

## 2023-07-11 DIAGNOSIS — I10 ESSENTIAL HYPERTENSION: Chronic | ICD-10-CM

## 2023-07-11 DIAGNOSIS — K21.9 GASTROESOPHAGEAL REFLUX DISEASE WITHOUT ESOPHAGITIS: ICD-10-CM

## 2023-07-11 RX ORDER — OMEPRAZOLE 20 MG/1
20 CAPSULE, DELAYED RELEASE ORAL
Qty: 90 CAPSULE | Refills: 1 | Status: SHIPPED | OUTPATIENT
Start: 2023-07-11 | End: 2023-11-14 | Stop reason: SDUPTHER

## 2023-08-03 DIAGNOSIS — I10 ESSENTIAL HYPERTENSION: Chronic | ICD-10-CM

## 2023-08-03 DIAGNOSIS — I50.32 CHRONIC DIASTOLIC CONGESTIVE HEART FAILURE (HCC): ICD-10-CM

## 2023-10-30 ENCOUNTER — HOSPITAL ENCOUNTER (OUTPATIENT)
Dept: LAB | Facility: MEDICAL CENTER | Age: 83
End: 2023-10-30
Attending: NURSE PRACTITIONER
Payer: MEDICARE

## 2023-10-30 DIAGNOSIS — E78.5 DYSLIPIDEMIA: ICD-10-CM

## 2023-10-30 LAB
CHOLEST SERPL-MCNC: 169 MG/DL (ref 100–199)
FASTING STATUS PATIENT QL REPORTED: NORMAL
HDLC SERPL-MCNC: 72 MG/DL
LDLC SERPL CALC-MCNC: 84 MG/DL
TRIGL SERPL-MCNC: 64 MG/DL (ref 0–149)

## 2023-10-30 PROCEDURE — 36415 COLL VENOUS BLD VENIPUNCTURE: CPT

## 2023-10-30 PROCEDURE — 80061 LIPID PANEL: CPT

## 2023-11-14 ENCOUNTER — OFFICE VISIT (OUTPATIENT)
Dept: MEDICAL GROUP | Facility: PHYSICIAN GROUP | Age: 83
End: 2023-11-14
Payer: MEDICARE

## 2023-11-14 VITALS
HEIGHT: 57 IN | TEMPERATURE: 96.9 F | RESPIRATION RATE: 16 BRPM | DIASTOLIC BLOOD PRESSURE: 70 MMHG | HEART RATE: 67 BPM | BODY MASS INDEX: 31.07 KG/M2 | WEIGHT: 144 LBS | OXYGEN SATURATION: 95 % | SYSTOLIC BLOOD PRESSURE: 110 MMHG

## 2023-11-14 DIAGNOSIS — R73.03 PREDIABETES: ICD-10-CM

## 2023-11-14 DIAGNOSIS — K21.9 GASTROESOPHAGEAL REFLUX DISEASE WITHOUT ESOPHAGITIS: ICD-10-CM

## 2023-11-14 DIAGNOSIS — N18.32 STAGE 3B CHRONIC KIDNEY DISEASE: ICD-10-CM

## 2023-11-14 DIAGNOSIS — I10 ESSENTIAL HYPERTENSION: ICD-10-CM

## 2023-11-14 LAB
HBA1C MFR BLD: 6.1 % (ref ?–5.8)
POCT INT CON NEG: NEGATIVE
POCT INT CON POS: POSITIVE

## 2023-11-14 PROCEDURE — 3074F SYST BP LT 130 MM HG: CPT | Performed by: NURSE PRACTITIONER

## 2023-11-14 PROCEDURE — 3078F DIAST BP <80 MM HG: CPT | Performed by: NURSE PRACTITIONER

## 2023-11-14 PROCEDURE — 83036 HEMOGLOBIN GLYCOSYLATED A1C: CPT | Performed by: NURSE PRACTITIONER

## 2023-11-14 PROCEDURE — 99214 OFFICE O/P EST MOD 30 MIN: CPT | Performed by: NURSE PRACTITIONER

## 2023-11-14 RX ORDER — OMEPRAZOLE 20 MG/1
20 CAPSULE, DELAYED RELEASE ORAL
Qty: 90 CAPSULE | Refills: 1 | Status: SHIPPED | OUTPATIENT
Start: 2023-11-14

## 2023-11-14 ASSESSMENT — FIBROSIS 4 INDEX: FIB4 SCORE: 1.35

## 2023-11-14 NOTE — PROGRESS NOTES
Subjective       CC:   Chief Complaint   Patient presents with    Diabetes Follow-up        HPI:   Patient is a 83 y.o. established female patient with medical history listed below here today for evaluation and management of prediabetes.   She has appointment with nephrology and cardiology next month.     Problem   Prediabetes    - diet controlled     Stage 3b Chronic Kidney Disease (Hcc)    GFR 31  Followed by nephrology  BP managed on Lisinopril 40mg QD;  HF managed on Metoprolol 25mg BID, Lasix 80mg QD  Prediabetes is diet controlled     Essential Hypertension    Followed by cardiology; Also with hx CABG 2014, HF  Followed by nephrology for CKD3  Taking Lisinopril 40mg QD, Lopressor 25mg BID         Patient Active Problem List   Diagnosis    Chronic diastolic congestive heart failure (HCC)    Dyslipidemia    COPD (chronic obstructive pulmonary disease) (HCC)    Essential hypertension    Hx of CABG    CAD - Coronary artery disease involving native coronary artery of native heart without angina pectoris    Osteopenia    Risk for falls    Pulmonary hypertension (HCC)    Stage 3b chronic kidney disease (HCC)    Tonsil pain    Healthcare maintenance    Prediabetes    Muscle cramping    Acute cystitis without hematuria    Gastroesophageal reflux disease without esophagitis    Secondary hyperparathyroidism of renal origin (HCC)       Past Medical History:   Diagnosis Date    Anemia     Anxiety     Arrhythmia     Arthritis     Blood transfusion, without reported diagnosis     CHF (congestive heart failure) (HCC)     Chronic airway obstruction, not elsewhere classified     Depression     Emphysema     GERD (gastroesophageal reflux disease)     Headache, classical migraine     Heart attack (HCC)     Heart murmur     Hyperlipidemia     Hypertension     IBD (inflammatory bowel disease)     Kidney disease     Ulcer     Unspecified cataract         Past Surgical History:   Procedure Laterality Date    MULTIPLE CORONARY ARTERY  BYPASS ENDO VEIN HARVEST  9/1/2014    Performed by Stephen Nowak M.D. at SURGERY [facility] ORS    BLADDER NECK CONTRACTURE EXICISION  1970    GANGLION EXCISION  1969    left wrist    ABDOMINAL EXPLORATION      ABDOMINAL HYSTERECTOMY TOTAL      fibroids    APPENDECTOMY      DENTAL EXTRACTION(S)          Current Outpatient Medications on File Prior to Visit   Medication Sig Dispense Refill    metoprolol tartrate (LOPRESSOR) 25 MG Tab TAKE 1 TABLET BY MOUTH TWICE A  Tablet 3    atorvastatin (LIPITOR) 80 MG tablet TAKE 1 TABLET BY MOUTH EVERY DAY IN THE EVENING 90 Tablet 3    lisinopril (PRINIVIL) 40 MG tablet TAKE 1 TABLET BY MOUTH EVERY DAY 90 Tablet 3    furosemide (LASIX) 80 MG Tab Take 1 Tablet by mouth every day. 90 Tablet 3    fluticasone-salmeterol (WIXELA INHUB) 250-50 MCG/ACT AEROSOL POWDER, BREATH ACTIVATED Inhale 1 Puff every 12 hours. 180 Each 3    Cholecalciferol 2000 UNIT Cap Take 5,000 Units by mouth. 3 times a week (M,W,F)      albuterol 108 (90 Base) MCG/ACT Aero Soln inhalation aerosol Inhale 2 Puffs every four hours as needed (Emergency Rescue use for - Shortness of Breath, or Wheezing.). 1 Each 3    Probiotic Product (PROBIOTIC DAILY PO) Take  by mouth.      aspirin EC (ECOTRIN) 81 MG TBEC Take 1 Tab by mouth every day. 90 Tab 3     No current facility-administered medications on file prior to visit.        Health Maintenance: Completed    ROS:  Review of Systems   Constitutional: Negative.  Negative for fever and malaise/fatigue.   HENT: Negative.     Eyes: Negative.    Respiratory:  Negative for cough, sputum production and shortness of breath.    Cardiovascular:  Negative for chest pain, palpitations and leg swelling.   Gastrointestinal: Negative.    Genitourinary: Negative.    Musculoskeletal: Negative.         Ambulates with cane   Neurological: Negative.    Endo/Heme/Allergies: Negative.    Psychiatric/Behavioral: Negative.         Objective       Exam:  /70   Pulse 67    "Temp 36.1 °C (96.9 °F) (Temporal)   Resp 16   Ht 1.448 m (4' 9\")   Wt 65.3 kg (144 lb)   SpO2 95%   BMI 31.16 kg/m²  Body mass index is 31.16 kg/m².    Physical Exam  Constitutional:       Appearance: Normal appearance.   Cardiovascular:      Rate and Rhythm: Normal rate and regular rhythm.      Pulses: Normal pulses.      Heart sounds: Normal heart sounds.   Pulmonary:      Effort: Pulmonary effort is normal.      Breath sounds: Normal breath sounds.   Musculoskeletal:      Right lower leg: Edema present.      Left lower leg: Edema present.      Comments: Ambulates with cane, wheelchair assistance for long distance   Skin:     General: Skin is warm and dry.   Neurological:      General: No focal deficit present.      Mental Status: She is alert and oriented to person, place, and time.       Labs: reviewed with patient; questions answered    Assessment & Plan       83 y.o. female with the following -     Problem List Items Addressed This Visit       Essential hypertension     BP in clinic today 110/70; no CP, palpitations, dizziness reported today. No edema noted in LE. Continue management per cardiology  - Lisinopril 40mg QD, Lopressor 25mg BID  - Also on Lasix 80mg QD for HF         Stage 3b chronic kidney disease (HCC)     GFR 31  Followed by nephrology  BP managed on Lisinopril 40mg QD;  HF managed on Metoprolol 25mg BID, Lasix 80mg QD  Prediabetes is diet controlled  - avoid nephrotoxins         Prediabetes     A1C today in clinic 6.1 (down from 6.2)  - continue with low sugar, reduced carb intake  - monitor A1C Q 6 months  - declined retinal screen today; we will get it done at next visit         Relevant Orders    POCT  A1C (Completed)     Educated in proper administration of medication(s) ordered today including safety, possible SE, risks, benefits, rationale and alternatives to therapy.     Return in about 4 months (around 3/14/2024) for Medicare Annual Visit.    Please note that this dictation was " created using voice recognition software. I have made every reasonable attempt to correct obvious errors, but I expect that there are errors of grammar and possibly content that I did not discover before finalizing the note.

## 2023-11-16 ASSESSMENT — ENCOUNTER SYMPTOMS
EYES NEGATIVE: 1
MUSCULOSKELETAL NEGATIVE: 1
PALPITATIONS: 0
COUGH: 0
NEUROLOGICAL NEGATIVE: 1
FEVER: 0
SHORTNESS OF BREATH: 0
PSYCHIATRIC NEGATIVE: 1
SPUTUM PRODUCTION: 0
CONSTITUTIONAL NEGATIVE: 1
GASTROINTESTINAL NEGATIVE: 1

## 2023-11-16 NOTE — ASSESSMENT & PLAN NOTE
A1C today in clinic 6.1 (down from 6.2)  - continue with low sugar, reduced carb intake  - monitor A1C Q 6 months  - declined retinal screen today; we will get it done at next visit

## 2023-11-16 NOTE — ASSESSMENT & PLAN NOTE
BP in clinic today 110/70; no CP, palpitations, dizziness reported today. Edema in LE, this is chronic for her; lasix was increased to 80mg at her last appt with nephrology in 6/2023. Continue management per cardiology  - Lisinopril 40mg QD, Lopressor 25mg BID  - Also on Lasix 80mg QD for HF

## 2023-11-16 NOTE — ASSESSMENT & PLAN NOTE
GFR 31  Followed by nephrology  BP managed on Lisinopril 40mg QD;  HF managed on Metoprolol 25mg BID, Lasix 80mg QD  Prediabetes is diet controlled  - avoid nephrotoxins

## 2023-12-07 ENCOUNTER — HOSPITAL ENCOUNTER (OUTPATIENT)
Dept: LAB | Facility: MEDICAL CENTER | Age: 83
End: 2023-12-07
Attending: INTERNAL MEDICINE
Payer: MEDICARE

## 2023-12-07 DIAGNOSIS — D64.9 ANEMIA, UNSPECIFIED TYPE: ICD-10-CM

## 2023-12-07 DIAGNOSIS — N25.81 SECONDARY HYPERPARATHYROIDISM (HCC): ICD-10-CM

## 2023-12-07 DIAGNOSIS — N18.32 STAGE 3B CHRONIC KIDNEY DISEASE: ICD-10-CM

## 2023-12-07 LAB
ALBUMIN SERPL BCP-MCNC: 4.5 G/DL (ref 3.2–4.9)
ANION GAP SERPL CALC-SCNC: 12 MMOL/L (ref 7–16)
APPEARANCE UR: CLEAR
BACTERIA #/AREA URNS HPF: NEGATIVE /HPF
BILIRUB UR QL STRIP.AUTO: NEGATIVE
BUN SERPL-MCNC: 43 MG/DL (ref 8–22)
CALCIUM SERPL-MCNC: 9.4 MG/DL (ref 8.5–10.5)
CHLORIDE SERPL-SCNC: 104 MMOL/L (ref 96–112)
CO2 SERPL-SCNC: 23 MMOL/L (ref 20–33)
COLOR UR: YELLOW
CREAT SERPL-MCNC: 1.76 MG/DL (ref 0.5–1.4)
EPI CELLS #/AREA URNS HPF: NORMAL /HPF
ERYTHROCYTE [DISTWIDTH] IN BLOOD BY AUTOMATED COUNT: 56.2 FL (ref 35.9–50)
GFR SERPLBLD CREATININE-BSD FMLA CKD-EPI: 28 ML/MIN/1.73 M 2
GLUCOSE SERPL-MCNC: 102 MG/DL (ref 65–99)
GLUCOSE UR STRIP.AUTO-MCNC: NEGATIVE MG/DL
HCT VFR BLD AUTO: 38.2 % (ref 37–47)
HGB BLD-MCNC: 12.3 G/DL (ref 12–16)
HYALINE CASTS #/AREA URNS LPF: NORMAL /LPF
IRON SATN MFR SERPL: 33 % (ref 15–55)
IRON SERPL-MCNC: 105 UG/DL (ref 40–170)
KETONES UR STRIP.AUTO-MCNC: NEGATIVE MG/DL
LEUKOCYTE ESTERASE UR QL STRIP.AUTO: ABNORMAL
MCH RBC QN AUTO: 31.4 PG (ref 27–33)
MCHC RBC AUTO-ENTMCNC: 32.2 G/DL (ref 32.2–35.5)
MCV RBC AUTO: 97.4 FL (ref 81.4–97.8)
MICRO URNS: ABNORMAL
NITRITE UR QL STRIP.AUTO: NEGATIVE
PH UR STRIP.AUTO: 6 [PH] (ref 5–8)
PHOSPHATE SERPL-MCNC: 4.4 MG/DL (ref 2.5–4.5)
PLATELET # BLD AUTO: 326 K/UL (ref 164–446)
PMV BLD AUTO: 9.9 FL (ref 9–12.9)
POTASSIUM SERPL-SCNC: 5.8 MMOL/L (ref 3.6–5.5)
PROT UR QL STRIP: NEGATIVE MG/DL
RBC # BLD AUTO: 3.92 M/UL (ref 4.2–5.4)
RBC # URNS HPF: NORMAL /HPF
RBC UR QL AUTO: NEGATIVE
SODIUM SERPL-SCNC: 139 MMOL/L (ref 135–145)
SP GR UR STRIP.AUTO: 1.01
TIBC SERPL-MCNC: 321 UG/DL (ref 250–450)
UIBC SERPL-MCNC: 216 UG/DL (ref 110–370)
UROBILINOGEN UR STRIP.AUTO-MCNC: 0.2 MG/DL
WBC # BLD AUTO: 10.1 K/UL (ref 4.8–10.8)
WBC #/AREA URNS HPF: NORMAL /HPF

## 2023-12-07 PROCEDURE — 82040 ASSAY OF SERUM ALBUMIN: CPT

## 2023-12-07 PROCEDURE — 84100 ASSAY OF PHOSPHORUS: CPT

## 2023-12-07 PROCEDURE — 83550 IRON BINDING TEST: CPT

## 2023-12-07 PROCEDURE — 82728 ASSAY OF FERRITIN: CPT

## 2023-12-07 PROCEDURE — 83540 ASSAY OF IRON: CPT

## 2023-12-07 PROCEDURE — 81001 URINALYSIS AUTO W/SCOPE: CPT

## 2023-12-07 PROCEDURE — 36415 COLL VENOUS BLD VENIPUNCTURE: CPT

## 2023-12-07 PROCEDURE — 80048 BASIC METABOLIC PNL TOTAL CA: CPT

## 2023-12-07 PROCEDURE — 85027 COMPLETE CBC AUTOMATED: CPT

## 2023-12-07 PROCEDURE — 82570 ASSAY OF URINE CREATININE: CPT

## 2023-12-07 PROCEDURE — 82043 UR ALBUMIN QUANTITATIVE: CPT

## 2023-12-07 PROCEDURE — 84156 ASSAY OF PROTEIN URINE: CPT

## 2023-12-08 LAB
CREAT UR-MCNC: 23.39 MG/DL
CREAT UR-MCNC: 24.56 MG/DL
FERRITIN SERPL-MCNC: 69.4 NG/ML (ref 10–291)
MICROALBUMIN UR-MCNC: <1.2 MG/DL
MICROALBUMIN/CREAT UR: NORMAL MG/G (ref 0–30)
PROT UR-MCNC: <4 MG/DL (ref 0–15)
PROT/CREAT UR: NORMAL MG/G (ref 10–107)

## 2023-12-13 ENCOUNTER — APPOINTMENT (OUTPATIENT)
Dept: CARDIOLOGY | Facility: MEDICAL CENTER | Age: 83
End: 2023-12-13
Attending: NURSE PRACTITIONER
Payer: MEDICARE

## 2023-12-15 ENCOUNTER — APPOINTMENT (OUTPATIENT)
Dept: NEPHROLOGY | Facility: MEDICAL CENTER | Age: 83
End: 2023-12-15
Payer: MEDICARE

## 2023-12-20 ENCOUNTER — TELEPHONE (OUTPATIENT)
Dept: NEPHROLOGY | Facility: MEDICAL CENTER | Age: 83
End: 2023-12-20
Payer: MEDICARE

## 2023-12-20 DIAGNOSIS — E87.5 HYPERKALEMIA: ICD-10-CM

## 2023-12-21 NOTE — PROGRESS NOTES
Mild high K noted. Repeat BMP.    Vitaliy Simon MD  Nephrology  [facility]

## 2023-12-21 NOTE — TELEPHONE ENCOUNTER
Advised patient needs repeat BMP.    Patient says she has no transportation  Till Jan 2024 when insurance changes to [employer].

## 2024-01-10 ENCOUNTER — APPOINTMENT (OUTPATIENT)
Dept: CARDIOLOGY | Facility: MEDICAL CENTER | Age: 84
End: 2024-01-10
Attending: NURSE PRACTITIONER
Payer: MEDICARE

## 2024-01-23 ENCOUNTER — TELEPHONE (OUTPATIENT)
Dept: HEALTH INFORMATION MANAGEMENT | Facility: OTHER | Age: 84
End: 2024-01-23
Payer: MEDICARE

## 2024-02-08 ENCOUNTER — HOSPITAL ENCOUNTER (OUTPATIENT)
Dept: LAB | Facility: MEDICAL CENTER | Age: 84
End: 2024-02-08
Attending: INTERNAL MEDICINE
Payer: MEDICARE

## 2024-02-08 DIAGNOSIS — E87.5 HYPERKALEMIA: ICD-10-CM

## 2024-02-08 LAB
ANION GAP SERPL CALC-SCNC: 15 MMOL/L (ref 7–16)
BUN SERPL-MCNC: 48 MG/DL (ref 8–22)
CALCIUM SERPL-MCNC: 9.9 MG/DL (ref 8.5–10.5)
CHLORIDE SERPL-SCNC: 102 MMOL/L (ref 96–112)
CO2 SERPL-SCNC: 22 MMOL/L (ref 20–33)
CREAT SERPL-MCNC: 2.05 MG/DL (ref 0.5–1.4)
GFR SERPLBLD CREATININE-BSD FMLA CKD-EPI: 23 ML/MIN/1.73 M 2
GLUCOSE SERPL-MCNC: 112 MG/DL (ref 65–99)
POTASSIUM SERPL-SCNC: 6.3 MMOL/L (ref 3.6–5.5)
SODIUM SERPL-SCNC: 139 MMOL/L (ref 135–145)

## 2024-02-08 PROCEDURE — 80048 BASIC METABOLIC PNL TOTAL CA: CPT

## 2024-02-08 PROCEDURE — 36415 COLL VENOUS BLD VENIPUNCTURE: CPT

## 2024-02-13 ENCOUNTER — OFFICE VISIT (OUTPATIENT)
Dept: NEPHROLOGY | Facility: MEDICAL CENTER | Age: 84
End: 2024-02-13
Payer: MEDICARE

## 2024-02-13 VITALS
TEMPERATURE: 98.4 F | HEIGHT: 60 IN | OXYGEN SATURATION: 94 % | WEIGHT: 147 LBS | SYSTOLIC BLOOD PRESSURE: 130 MMHG | DIASTOLIC BLOOD PRESSURE: 70 MMHG | HEART RATE: 54 BPM | BODY MASS INDEX: 28.86 KG/M2

## 2024-02-13 DIAGNOSIS — N18.32 STAGE 3B CHRONIC KIDNEY DISEASE: ICD-10-CM

## 2024-02-13 DIAGNOSIS — N25.81 SECONDARY HYPERPARATHYROIDISM OF RENAL ORIGIN (HCC): ICD-10-CM

## 2024-02-13 DIAGNOSIS — N17.9 AKI (ACUTE KIDNEY INJURY) (HCC): ICD-10-CM

## 2024-02-13 DIAGNOSIS — R10.9 LEFT FLANK PAIN: ICD-10-CM

## 2024-02-13 DIAGNOSIS — I50.32 CHRONIC DIASTOLIC CONGESTIVE HEART FAILURE (HCC): ICD-10-CM

## 2024-02-13 DIAGNOSIS — I10 ESSENTIAL HYPERTENSION: ICD-10-CM

## 2024-02-13 PROCEDURE — 99214 OFFICE O/P EST MOD 30 MIN: CPT | Performed by: INTERNAL MEDICINE

## 2024-02-13 PROCEDURE — 3078F DIAST BP <80 MM HG: CPT | Performed by: INTERNAL MEDICINE

## 2024-02-13 PROCEDURE — 3075F SYST BP GE 130 - 139MM HG: CPT | Performed by: INTERNAL MEDICINE

## 2024-02-13 RX ORDER — AMLODIPINE BESYLATE 10 MG/1
10 TABLET ORAL DAILY
Qty: 30 TABLET | Refills: 11 | Status: SHIPPED | OUTPATIENT
Start: 2024-02-13 | End: 2024-03-05 | Stop reason: SDUPTHER

## 2024-02-13 ASSESSMENT — ENCOUNTER SYMPTOMS
SHORTNESS OF BREATH: 0
BACK PAIN: 1
HALLUCINATIONS: 1
ABDOMINAL PAIN: 1
FEVER: 0

## 2024-02-13 ASSESSMENT — FIBROSIS 4 INDEX: FIB4 SCORE: 1.36

## 2024-02-13 NOTE — PROGRESS NOTES
Chief Complaint   Patient presents with    Chronic Kidney Disease       CC: Follow-up CKD    HPI:  Valorie Sierra is a 83 y.o. female with HTN, pre-diabetes, CKD 3B, chronic diastolic heart failure who presents today for follow-up.    She complains of back pain, worse with walking, improved with lying down and propping legs up. She denies NSAIDs, taking tylenol. Denies dysuria.     Re: HTN, diagnosed 2014 around her CABG surgery. BP control over the years has been well controlled.  She does not check BP at home. Says it's usually good at doctor's visits.     Re: Chronic diastolic heart failure.  The patient had CABG surgery in September 2014. She still takes lasix 80mg tab daily and she feels diuretic effect. LE edema is much better.      Re: CKD. She had mild MELITA (Scr of ~2) when she had CABG in 2014, denies history of MELITA requiring dialysis. Denies NSAIDs. Denies bladder emptying troubles.       Past Medical History:   Diagnosis Date    Anemia     Anxiety     Arrhythmia     Arthritis     Blood transfusion, without reported diagnosis     CHF (congestive heart failure) (HCC)     Chronic airway obstruction, not elsewhere classified     Depression     Emphysema     GERD (gastroesophageal reflux disease)     Headache, classical migraine     Heart attack (HCC)     Heart murmur     Hyperlipidemia     Hypertension     IBD (inflammatory bowel disease)     Kidney disease     Ulcer     Unspecified cataract        Past Surgical History:   Procedure Laterality Date    MULTIPLE CORONARY ARTERY BYPASS ENDO VEIN HARVEST  9/1/2014    Performed by Stephen Nowak M.D. at SURGERY [facility]    BLADDER NECK CONTRACTURE EXICISION  1970    GANGLION EXCISION  1969    left wrist    ABDOMINAL EXPLORATION      ABDOMINAL HYSTERECTOMY TOTAL      fibroids    APPENDECTOMY      DENTAL EXTRACTION(S)          Outpatient Encounter Medications as of 2/13/2024   Medication Sig Dispense Refill    fluticasone-salmeterol (ADVAIR) 250-50 MCG/ACT  AEROSOL POWDER, BREATH ACTIVATED TAKE 1 PUFF BY MOUTH EVERY 12 HOURS 180 Each 3    omeprazole (PRILOSEC) 20 MG delayed-release capsule Take 1 Capsule by mouth every day. 90 Capsule 1    metoprolol tartrate (LOPRESSOR) 25 MG Tab TAKE 1 TABLET BY MOUTH TWICE A  Tablet 3    lisinopril (PRINIVIL) 40 MG tablet TAKE 1 TABLET BY MOUTH EVERY DAY 90 Tablet 3    atorvastatin (LIPITOR) 80 MG tablet TAKE 1 TABLET BY MOUTH EVERY DAY IN THE EVENING 90 Tablet 3    furosemide (LASIX) 80 MG Tab Take 1 Tablet by mouth every day. 90 Tablet 3    Cholecalciferol 2000 UNIT Cap Take 5,000 Units by mouth. 3 times a week (M,W,F)      albuterol 108 (90 Base) MCG/ACT Aero Soln inhalation aerosol Inhale 2 Puffs every four hours as needed (Emergency Rescue use for - Shortness of Breath, or Wheezing.). 1 Each 3    Probiotic Product (PROBIOTIC DAILY PO) Take  by mouth.      aspirin EC (ECOTRIN) 81 MG TBEC Take 1 Tab by mouth every day. 90 Tab 3     No facility-administered encounter medications on file as of 2/13/2024.        Allergies   Allergen Reactions    Iodine Itching    Oxycodone-Acetaminophen Hives    Sulfa Drugs Vomiting    Food      avocado    Nsaids      Pt states she was told to never take these because of her kidneys.             Review of Systems   Constitutional:  Negative for fever.   Respiratory:  Negative for shortness of breath.    Cardiovascular:  Negative for chest pain.   Gastrointestinal:  Positive for abdominal pain (the other day, now back to normal).   Genitourinary:  Negative for dysuria and hematuria.   Musculoskeletal:  Positive for back pain.   Psychiatric/Behavioral:  Positive for hallucinations (seeing spiders).    All other systems reviewed and are negative.      /70 (BP Location: Right arm, Patient Position: Sitting, BP Cuff Size: Adult)   Pulse (!) 54   Temp 36.9 °C (98.4 °F) (Temporal)   Ht 1.524 m (5')   Wt 66.7 kg (147 lb)   SpO2 94%   BMI 28.71 kg/m²     Physical Exam  Constitutional:        General: She is not in acute distress.  HENT:      Mouth/Throat:      Pharynx: No oropharyngeal exudate.   Eyes:      General: No scleral icterus.  Neck:      Trachea: No tracheal deviation.   Cardiovascular:      Rate and Rhythm: Normal rate and regular rhythm.      Heart sounds: Normal heart sounds. No murmur heard.  Pulmonary:      Effort: Pulmonary effort is normal.      Breath sounds: Normal breath sounds. No stridor. No rales.   Abdominal:      General: Bowel sounds are normal.      Palpations: Abdomen is soft.      Tenderness: There is no abdominal tenderness. There is left CVA tenderness.   Musculoskeletal:         General: Normal range of motion.      Cervical back: Neck supple.      Right lower leg: No edema.      Left lower leg: No edema.   Skin:     General: Skin is warm and dry.      Findings: No rash.   Neurological:      General: No focal deficit present.      Mental Status: She is alert and oriented to person, place, and time.   Psychiatric:         Mood and Affect: Mood and affect normal.         Behavior: Behavior normal.         Labs reviewed.    Recent Labs     05/30/23  1629 06/16/23  1444 10/30/23  0949 12/07/23  1214 02/08/24  1034   ALBUMIN 4.1  --   --  4.5  --    HDL  --   --  72  --   --    TRIGLYCERIDE  --   --  64  --   --    SODIUM 140 142  --  139 139   POTASSIUM 4.9 4.9  --  5.8* 6.3*   CHLORIDE 104 103  --  104 102   CO2 24 24  --  23 22   BUN 29* 46*  --  43* 48*   CREATININE 1.31 1.63*  --  1.76* 2.05*   PHOSPHORUS 3.9  --   --  4.4  --        Lab Results   Component Value Date/Time    WBC 10.1 12/07/2023 12:14 PM    RBC 3.92 (L) 12/07/2023 12:14 PM    HEMOGLOBIN 12.3 12/07/2023 12:14 PM    HEMATOCRIT 38.2 12/07/2023 12:14 PM    MCV 97.4 12/07/2023 12:14 PM    MCH 31.4 12/07/2023 12:14 PM    MCHC 32.2 12/07/2023 12:14 PM    MPV 9.9 12/07/2023 12:14 PM             URINALYSIS:  Lab Results   Component Value Date/Time    COLORURINE Yellow 12/07/2023 1215    CLARITY Clear 12/07/2023  1215    SPECGRAVITY 1.009 12/07/2023 1215    PHURINE 6.0 12/07/2023 1215    KETONES Negative 12/07/2023 1215    PROTEINURIN Negative 12/07/2023 1215    BILIRUBINUR Negative 12/07/2023 1215    UROBILU 0.2 12/07/2023 1215    NITRITE Negative 12/07/2023 1215    LEUKESTERAS Trace (A) 12/07/2023 1215    OCCULTBLOOD Negative 12/07/2023 1215       UPC  Lab Results   Component Value Date/Time    TOTPROTUR <4.0 12/07/2023 1215      Lab Results   Component Value Date/Time    CREATININEU 24.56 12/07/2023 1215             Imaging reviewed  No orders to display         Assessment:  Valorie Sierra is a 83 y.o. female with HTN, pre-diabetes, CKD 3B, chronic diastolic heart failure who presents today for follow-up.    Plan:  1.  MELITA on CKD stage IIIb  -Unclear etiology of MELITA.  I had predicted her creatinine would worsen with diuresis, and her legs are less edematous.  However, patient also having hyperkalemia which is unexpected.  She does complain of left flank pain.  I recommended that the patient go to the emergency department for further evaluation, but she refused.  As such, I will attempt outpatient workup and treatment.  Recommend stat CT scan renal colic protocol to evaluate for possible obstructive uropathy from kidney stone.  Adjust medications as below.  The patient's underlying CKD likely from hypertension and age-related kidney decline.  I recommend avoiding NSAIDs and other nephrotoxins.  I recommend a low-salt diet.  I explained the importance of blood pressure control to help slow CKD progression.  Discontinue lisinopril as below.    2. Prediabetes  -I explained to the importance of glycemic control to help slow CKD progression.  If she progresses to diabetes, I would recommend SGLT2 inhibitor such as Farxiga 5 mg p.o. daily.  Avoid metformin with GFR less than 30.    3. Essential hypertension  -Mildly elevated.  Given her hyperkalemia, recommend discontinue lisinopril.  Start amlodipine 10 mg daily.  Continue  metoprolol.    4. Chronic diastolic congestive heart failure (HCC)  -Patient appears more euvolemic.  Continue Lasix 80 mg daily.      5.  Secondary hyperparathyroidism, due to CKD and vitamin D deficiency.    -Vitamin D level controlled in May 2023.  Recommend maintenance over-the-counter vitamin D 2000 units daily or 5000 units thrice weekly.  No acute need for vitamin D receptor agonist.    Return to clinic in 3 to 4 weeks with preclinic labs    Vitaliy Simon MD  Nephrology

## 2024-02-13 NOTE — PATIENT INSTRUCTIONS
STOP lisinopril.   START Amlodipine.   CT scan of kidneys ASAP.   Come back to see me in 3-4 weeks with labs beforehand.

## 2024-02-21 ENCOUNTER — HOSPITAL ENCOUNTER (OUTPATIENT)
Dept: RADIOLOGY | Facility: MEDICAL CENTER | Age: 84
End: 2024-02-21
Attending: INTERNAL MEDICINE
Payer: MEDICARE

## 2024-02-21 ENCOUNTER — HOSPITAL ENCOUNTER (OUTPATIENT)
Dept: LAB | Facility: MEDICAL CENTER | Age: 84
End: 2024-02-21
Attending: INTERNAL MEDICINE
Payer: MEDICARE

## 2024-02-21 DIAGNOSIS — N17.9 AKI (ACUTE KIDNEY INJURY) (HCC): ICD-10-CM

## 2024-02-21 DIAGNOSIS — R10.9 LEFT FLANK PAIN: ICD-10-CM

## 2024-02-21 LAB
ALBUMIN SERPL BCP-MCNC: 4 G/DL (ref 3.2–4.9)
ANION GAP SERPL CALC-SCNC: 14 MMOL/L (ref 7–16)
APPEARANCE UR: CLEAR
BACTERIA #/AREA URNS HPF: NEGATIVE /HPF
BILIRUB UR QL STRIP.AUTO: NEGATIVE
BUN SERPL-MCNC: 48 MG/DL (ref 8–22)
CALCIUM SERPL-MCNC: 9.1 MG/DL (ref 8.5–10.5)
CHLORIDE SERPL-SCNC: 102 MMOL/L (ref 96–112)
CO2 SERPL-SCNC: 23 MMOL/L (ref 20–33)
COLOR UR: YELLOW
CREAT SERPL-MCNC: 2 MG/DL (ref 0.5–1.4)
EPI CELLS #/AREA URNS HPF: ABNORMAL /HPF
ERYTHROCYTE [DISTWIDTH] IN BLOOD BY AUTOMATED COUNT: 49.8 FL (ref 35.9–50)
GFR SERPLBLD CREATININE-BSD FMLA CKD-EPI: 24 ML/MIN/1.73 M 2
GLUCOSE SERPL-MCNC: 120 MG/DL (ref 65–99)
GLUCOSE UR STRIP.AUTO-MCNC: NEGATIVE MG/DL
HCT VFR BLD AUTO: 37.3 % (ref 37–47)
HGB BLD-MCNC: 12 G/DL (ref 12–16)
HYALINE CASTS #/AREA URNS LPF: ABNORMAL /LPF
KETONES UR STRIP.AUTO-MCNC: NEGATIVE MG/DL
LEUKOCYTE ESTERASE UR QL STRIP.AUTO: ABNORMAL
MCH RBC QN AUTO: 30.7 PG (ref 27–33)
MCHC RBC AUTO-ENTMCNC: 32.2 G/DL (ref 32.2–35.5)
MCV RBC AUTO: 95.4 FL (ref 81.4–97.8)
MICRO URNS: ABNORMAL
NITRITE UR QL STRIP.AUTO: NEGATIVE
PH UR STRIP.AUTO: 6 [PH] (ref 5–8)
PHOSPHATE SERPL-MCNC: 4.8 MG/DL (ref 2.5–4.5)
PLATELET # BLD AUTO: 266 K/UL (ref 164–446)
PMV BLD AUTO: 10.9 FL (ref 9–12.9)
POTASSIUM SERPL-SCNC: 5.2 MMOL/L (ref 3.6–5.5)
PROT UR QL STRIP: NEGATIVE MG/DL
RBC # BLD AUTO: 3.91 M/UL (ref 4.2–5.4)
RBC # URNS HPF: ABNORMAL /HPF
RBC UR QL AUTO: NEGATIVE
SODIUM SERPL-SCNC: 139 MMOL/L (ref 135–145)
SP GR UR STRIP.AUTO: 1.01
UROBILINOGEN UR STRIP.AUTO-MCNC: 0.2 MG/DL
WBC # BLD AUTO: 9.6 K/UL (ref 4.8–10.8)
WBC #/AREA URNS HPF: ABNORMAL /HPF

## 2024-02-21 PROCEDURE — 84156 ASSAY OF PROTEIN URINE: CPT

## 2024-02-21 PROCEDURE — 81001 URINALYSIS AUTO W/SCOPE: CPT

## 2024-02-21 PROCEDURE — 84100 ASSAY OF PHOSPHORUS: CPT

## 2024-02-21 PROCEDURE — 82570 ASSAY OF URINE CREATININE: CPT

## 2024-02-21 PROCEDURE — 80048 BASIC METABOLIC PNL TOTAL CA: CPT

## 2024-02-21 PROCEDURE — 82043 UR ALBUMIN QUANTITATIVE: CPT

## 2024-02-21 PROCEDURE — 74176 CT ABD & PELVIS W/O CONTRAST: CPT

## 2024-02-21 PROCEDURE — 36415 COLL VENOUS BLD VENIPUNCTURE: CPT

## 2024-02-21 PROCEDURE — 85027 COMPLETE CBC AUTOMATED: CPT

## 2024-02-21 PROCEDURE — 82040 ASSAY OF SERUM ALBUMIN: CPT

## 2024-02-22 LAB
CREAT UR-MCNC: 37.95 MG/DL
CREAT UR-MCNC: 38.59 MG/DL
MICROALBUMIN UR-MCNC: <1.2 MG/DL
MICROALBUMIN/CREAT UR: NORMAL MG/G (ref 0–30)
PROT UR-MCNC: 4 MG/DL (ref 0–15)
PROT/CREAT UR: 104 MG/G (ref 10–107)

## 2024-03-05 ENCOUNTER — OFFICE VISIT (OUTPATIENT)
Dept: NEPHROLOGY | Facility: MEDICAL CENTER | Age: 84
End: 2024-03-05
Payer: MEDICARE

## 2024-03-05 VITALS
WEIGHT: 140 LBS | HEART RATE: 60 BPM | HEIGHT: 60 IN | DIASTOLIC BLOOD PRESSURE: 72 MMHG | BODY MASS INDEX: 27.48 KG/M2 | RESPIRATION RATE: 18 BRPM | SYSTOLIC BLOOD PRESSURE: 122 MMHG | TEMPERATURE: 97.8 F | OXYGEN SATURATION: 95 %

## 2024-03-05 DIAGNOSIS — N18.4 CKD (CHRONIC KIDNEY DISEASE) STAGE 4, GFR 15-29 ML/MIN (HCC): ICD-10-CM

## 2024-03-05 DIAGNOSIS — R60.9 EDEMA, UNSPECIFIED TYPE: ICD-10-CM

## 2024-03-05 DIAGNOSIS — J43.1 PANLOBULAR EMPHYSEMA (HCC): ICD-10-CM

## 2024-03-05 DIAGNOSIS — N25.81 SECONDARY HYPERPARATHYROIDISM OF RENAL ORIGIN (HCC): ICD-10-CM

## 2024-03-05 DIAGNOSIS — R30.0 DYSURIA: ICD-10-CM

## 2024-03-05 DIAGNOSIS — I10 ESSENTIAL HYPERTENSION: ICD-10-CM

## 2024-03-05 PROCEDURE — 3074F SYST BP LT 130 MM HG: CPT | Performed by: INTERNAL MEDICINE

## 2024-03-05 PROCEDURE — 99214 OFFICE O/P EST MOD 30 MIN: CPT | Performed by: INTERNAL MEDICINE

## 2024-03-05 PROCEDURE — 3078F DIAST BP <80 MM HG: CPT | Performed by: INTERNAL MEDICINE

## 2024-03-05 RX ORDER — FUROSEMIDE 80 MG
80 TABLET ORAL DAILY
Qty: 90 TABLET | Refills: 3 | Status: SHIPPED | OUTPATIENT
Start: 2024-03-05

## 2024-03-05 RX ORDER — AMLODIPINE BESYLATE 10 MG/1
10 TABLET ORAL DAILY
Qty: 90 TABLET | Refills: 3 | Status: SHIPPED | OUTPATIENT
Start: 2024-03-05

## 2024-03-05 ASSESSMENT — ENCOUNTER SYMPTOMS
BACK PAIN: 1
FEVER: 0
ABDOMINAL PAIN: 1
SHORTNESS OF BREATH: 0

## 2024-03-05 ASSESSMENT — FIBROSIS 4 INDEX: FIB4 SCORE: 1.671000828040794057

## 2024-03-05 NOTE — PROGRESS NOTES
Chief Complaint   Patient presents with    Follow-Up    Chronic Kidney Disease       CC: Follow-up CKD    HPI:  Valorie Sierra is a 83 y.o. female with HTN, pre-diabetes, CKD 3B, chronic diastolic heart failure who presents today for follow-up.    She complains of back pain. Still has back pain. CT scan 2/2024 neg for kidney stone. Pain hurts all the time except when she is lying down.  She denies NSAIDs, takes occasional tylenol. Denies dysuria.     Re: HTN, diagnosed 2014 around her CABG surgery. BP control over the years has been well controlled.  She doesn't check BP at home. Says it's usually good at doctor's visits.     Re: Chronic diastolic heart failure.  The patient had CABG surgery in September 2014. She still takes lasix 80mg tab daily and she feels diuretic effect. LE edema is better.     Re: CKD. She had mild MELITA (Scr of ~2) when she had CABG in 2014, denies history of MELITA requiring dialysis. Denies NSAIDs. Denies bladder emptying troubles.       Past Medical History:   Diagnosis Date    Anemia     Anxiety     Arrhythmia     Arthritis     Blood transfusion, without reported diagnosis     CHF (congestive heart failure) (HCC)     Chronic airway obstruction, not elsewhere classified     Depression     Emphysema     GERD (gastroesophageal reflux disease)     Headache, classical migraine     Heart attack (HCC)     Heart murmur     Hyperlipidemia     Hypertension     IBD (inflammatory bowel disease)     Kidney disease     Ulcer     Unspecified cataract        Past Surgical History:   Procedure Laterality Date    MULTIPLE CORONARY ARTERY BYPASS ENDO VEIN HARVEST  9/1/2014    Performed by Stephen Nowak M.D. at SURGERY [facility]    BLADDER NECK CONTRACTURE EXICISION  1970    GANGLION EXCISION  1969    left wrist    ABDOMINAL EXPLORATION      ABDOMINAL HYSTERECTOMY TOTAL      fibroids    APPENDECTOMY      DENTAL EXTRACTION(S)          Outpatient Encounter Medications as of 3/5/2024   Medication Sig  "Dispense Refill    amLODIPine (NORVASC) 10 MG Tab Take 1 Tablet by mouth every day. 30 Tablet 11    fluticasone-salmeterol (ADVAIR) 250-50 MCG/ACT AEROSOL POWDER, BREATH ACTIVATED TAKE 1 PUFF BY MOUTH EVERY 12 HOURS 180 Each 3    omeprazole (PRILOSEC) 20 MG delayed-release capsule Take 1 Capsule by mouth every day. 90 Capsule 1    metoprolol tartrate (LOPRESSOR) 25 MG Tab TAKE 1 TABLET BY MOUTH TWICE A  Tablet 3    atorvastatin (LIPITOR) 80 MG tablet TAKE 1 TABLET BY MOUTH EVERY DAY IN THE EVENING 90 Tablet 3    furosemide (LASIX) 80 MG Tab Take 1 Tablet by mouth every day. 90 Tablet 3    Cholecalciferol 2000 UNIT Cap Take 5,000 Units by mouth. 3 times a week (M,W,F)      albuterol 108 (90 Base) MCG/ACT Aero Soln inhalation aerosol Inhale 2 Puffs every four hours as needed (Emergency Rescue use for - Shortness of Breath, or Wheezing.). 1 Each 3    Probiotic Product (PROBIOTIC DAILY PO) Take  by mouth.      aspirin EC (ECOTRIN) 81 MG TBEC Take 1 Tab by mouth every day. 90 Tab 3     No facility-administered encounter medications on file as of 3/5/2024.        Allergies   Allergen Reactions    Iodine Itching    Oxycodone-Acetaminophen Hives    Sulfa Drugs Vomiting    Food      avocado    Nsaids      Pt states she was told to never take these because of her kidneys.             Review of Systems   Constitutional:  Negative for fever.   Respiratory:  Negative for shortness of breath.    Cardiovascular:  Negative for chest pain.   Gastrointestinal:  Positive for abdominal pain (\"a little upset\").   Genitourinary:  Positive for dysuria. Negative for hematuria.   Musculoskeletal:  Positive for back pain.   All other systems reviewed and are negative.      /72 (BP Location: Right arm, Patient Position: Sitting, BP Cuff Size: Adult)   Pulse 60   Temp 36.6 °C (97.8 °F) (Temporal)   Resp 18   Ht 1.524 m (5')   Wt 63.5 kg (140 lb)   SpO2 95%   BMI 27.34 kg/m²     Physical Exam  Constitutional:       General: " She is not in acute distress.  HENT:      Mouth/Throat:      Pharynx: No oropharyngeal exudate.   Eyes:      General: No scleral icterus.  Neck:      Trachea: No tracheal deviation.   Cardiovascular:      Rate and Rhythm: Normal rate and regular rhythm.      Heart sounds: Normal heart sounds. No murmur heard.  Pulmonary:      Effort: Pulmonary effort is normal.      Breath sounds: Normal breath sounds. No stridor. No rales.   Abdominal:      General: Bowel sounds are normal.      Palpations: Abdomen is soft.      Tenderness: There is no abdominal tenderness.   Musculoskeletal:         General: Tenderness (on palpation of paraspinal muscles bilaterally.) present. Normal range of motion.      Cervical back: Neck supple.      Right lower leg: Edema (1+) present.      Left lower leg: Edema (1+) present.   Skin:     General: Skin is warm and dry.      Findings: No rash.   Neurological:      General: No focal deficit present.      Mental Status: She is alert and oriented to person, place, and time.   Psychiatric:         Mood and Affect: Mood and affect normal.         Behavior: Behavior normal.         Labs reviewed.    Recent Labs     05/30/23  1629 06/16/23  1444 10/30/23  0949 12/07/23  1214 02/08/24  1034 02/21/24  1259   ALBUMIN 4.1  --   --  4.5  --  4.0   HDL  --   --  72  --   --   --    TRIGLYCERIDE  --   --  64  --   --   --    SODIUM 140   < >  --  139 139 139   POTASSIUM 4.9   < >  --  5.8* 6.3* 5.2   CHLORIDE 104   < >  --  104 102 102   CO2 24   < >  --  23 22 23   BUN 29*   < >  --  43* 48* 48*   CREATININE 1.31   < >  --  1.76* 2.05* 2.00*   PHOSPHORUS 3.9  --   --  4.4  --  4.8*    < > = values in this interval not displayed.       Lab Results   Component Value Date/Time    WBC 9.6 02/21/2024 12:59 PM    RBC 3.91 (L) 02/21/2024 12:59 PM    HEMOGLOBIN 12.0 02/21/2024 12:59 PM    HEMATOCRIT 37.3 02/21/2024 12:59 PM    MCV 95.4 02/21/2024 12:59 PM    MCH 30.7 02/21/2024 12:59 PM    MCHC 32.2 02/21/2024  12:59 PM    MPV 10.9 02/21/2024 12:59 PM             URINALYSIS:  Lab Results   Component Value Date/Time    COLORURINE Yellow 02/21/2024 1259    CLARITY Clear 02/21/2024 1259    SPECGRAVITY 1.009 02/21/2024 1259    PHURINE 6.0 02/21/2024 1259    KETONES Negative 02/21/2024 1259    PROTEINURIN Negative 02/21/2024 1259    BILIRUBINUR Negative 02/21/2024 1259    UROBILU 0.2 02/21/2024 1259    NITRITE Negative 02/21/2024 1259    LEUKESTERAS Small (A) 02/21/2024 1259    OCCULTBLOOD Negative 02/21/2024 1259       UPC  Lab Results   Component Value Date/Time    TOTPROTUR 4.0 02/21/2024 1259      Lab Results   Component Value Date/Time    CREATININEU 38.59 02/21/2024 1259               Imaging reviewed  No orders to display         Assessment:  Valorie Sierra is a 83 y.o. female with HTN, pre-diabetes, CKD 3B, chronic diastolic heart failure who presents today for follow-up.    Plan:  1.  MELITA on CKD stage IIIb  -Elevated creatinine persists, likely reflecting unmasking of CKD stage IV with increase in her diuretic regimen in late 2023.  Hyperkalemia improved with cessation of lisinopril.  The patient's underlying CKD likely from hypertension and age-related kidney decline.  I recommend avoiding NSAIDs and other nephrotoxins.  I recommend a low-salt diet.  I explained the importance of blood pressure control to help slow CKD progression.  Unfortunately, patient had hyperkalemia with lisinopril, recommend continuing to avoid lisinopril for now.    2. Prediabetes  -I explained to the importance of glycemic control to help slow CKD progression.  If she progresses to diabetes, I would recommend SGLT2 inhibitor such as Farxiga 5 mg p.o. daily.  Avoid metformin with GFR less than 30.    3. Essential hypertension  -Fairly well-controlled.  Continue to avoid lisinopril given history of hyperkalemia.  Continue amlodipine and Lasix and metoprolol.    4. Chronic diastolic congestive heart failure (HCC)  -Patient appears euvolemic.   Continue Lasix 80 mg p.o. daily.    5.  Secondary hyperparathyroidism, due to CKD and vitamin D deficiency.    -Vitamin D level controlled in May 2023.  Recommend continue maintenance over-the-counter vitamin D 2000 units daily or 5000 units thrice weekly.  There is no acute need for vitamin D receptor agonist.    6.  Back pain  - Most likely musculoskeletal.  I recommend gentle stretching exercises at home.    Return to clinic in 2 to 3 months with preclinic labs    Vitaliy Simon MD  Nephrology

## 2024-03-19 ENCOUNTER — OFFICE VISIT (OUTPATIENT)
Dept: MEDICAL GROUP | Facility: PHYSICIAN GROUP | Age: 84
End: 2024-03-19
Payer: MEDICARE

## 2024-03-19 VITALS
DIASTOLIC BLOOD PRESSURE: 70 MMHG | HEART RATE: 62 BPM | WEIGHT: 149 LBS | RESPIRATION RATE: 16 BRPM | SYSTOLIC BLOOD PRESSURE: 126 MMHG | TEMPERATURE: 98.7 F | HEIGHT: 60 IN | BODY MASS INDEX: 29.25 KG/M2 | OXYGEN SATURATION: 96 %

## 2024-03-19 DIAGNOSIS — Z00.00 ENCOUNTER FOR ANNUAL WELLNESS VISIT (AWV) IN MEDICARE PATIENT: ICD-10-CM

## 2024-03-19 DIAGNOSIS — N18.4 CKD (CHRONIC KIDNEY DISEASE) STAGE 4, GFR 15-29 ML/MIN (HCC): ICD-10-CM

## 2024-03-19 DIAGNOSIS — J43.1 PANLOBULAR EMPHYSEMA (HCC): ICD-10-CM

## 2024-03-19 DIAGNOSIS — B35.1 FUNGAL NAIL INFECTION: ICD-10-CM

## 2024-03-19 DIAGNOSIS — Z23 NEED FOR VACCINATION: ICD-10-CM

## 2024-03-19 DIAGNOSIS — I10 ESSENTIAL HYPERTENSION: ICD-10-CM

## 2024-03-19 DIAGNOSIS — E78.5 DYSLIPIDEMIA: ICD-10-CM

## 2024-03-19 DIAGNOSIS — R73.03 PREDIABETES: ICD-10-CM

## 2024-03-19 DIAGNOSIS — I50.32 CHRONIC DIASTOLIC CONGESTIVE HEART FAILURE (HCC): ICD-10-CM

## 2024-03-19 DIAGNOSIS — I25.10 CORONARY ARTERY DISEASE INVOLVING NATIVE CORONARY ARTERY OF NATIVE HEART WITHOUT ANGINA PECTORIS: ICD-10-CM

## 2024-03-19 PROCEDURE — 3074F SYST BP LT 130 MM HG: CPT | Performed by: NURSE PRACTITIONER

## 2024-03-19 PROCEDURE — 90715 TDAP VACCINE 7 YRS/> IM: CPT | Performed by: NURSE PRACTITIONER

## 2024-03-19 PROCEDURE — G0439 PPPS, SUBSEQ VISIT: HCPCS | Mod: 25 | Performed by: NURSE PRACTITIONER

## 2024-03-19 PROCEDURE — 90471 IMMUNIZATION ADMIN: CPT | Performed by: NURSE PRACTITIONER

## 2024-03-19 PROCEDURE — 3078F DIAST BP <80 MM HG: CPT | Performed by: NURSE PRACTITIONER

## 2024-03-19 ASSESSMENT — ACTIVITIES OF DAILY LIVING (ADL): BATHING_REQUIRES_ASSISTANCE: 0

## 2024-03-19 ASSESSMENT — PATIENT HEALTH QUESTIONNAIRE - PHQ9: CLINICAL INTERPRETATION OF PHQ2 SCORE: 0

## 2024-03-19 ASSESSMENT — FIBROSIS 4 INDEX: FIB4 SCORE: 1.671000828040794057

## 2024-03-19 ASSESSMENT — ENCOUNTER SYMPTOMS: GENERAL WELL-BEING: FAIR

## 2024-03-19 NOTE — PROGRESS NOTES
Chief Complaint   Patient presents with    Annual Wellness Visit       HPI:  Valorie Sierra is a 83 y.o. here for Medicare Annual Wellness Visit. She is doing okay today, no new concerns.      Patient Active Problem List    Diagnosis Date Noted    Fungal nail infection 03/19/2024    Secondary hyperparathyroidism of renal origin ([facility]) 06/09/2023    Gastroesophageal reflux disease without esophagitis 01/09/2023    Acute cystitis without hematuria 06/14/2022    Muscle cramping 10/28/2021    Prediabetes 06/16/2021    Tonsil pain 05/18/2021    Healthcare maintenance 05/18/2021    CKD (chronic kidney disease) stage 4, GFR 15-29 ml/min ([facility]) 06/07/2019    Pulmonary hypertension ([facility]) 07/31/2018    Risk for falls 04/18/2017    Osteopenia 11/01/2016    CAD - Coronary artery disease involving native coronary artery of native heart without angina pectoris 04/28/2016    Dyslipidemia 09/23/2014    COPD (chronic obstructive pulmonary disease) ([facility]) 09/23/2014    Essential hypertension 09/23/2014    Hx of CABG 09/23/2014    Chronic diastolic congestive heart failure ([facility]) 09/10/2014       Current Outpatient Medications   Medication Sig Dispense Refill    amLODIPine (NORVASC) 10 MG Tab Take 1 Tablet by mouth every day. 90 Tablet 3    furosemide (LASIX) 80 MG Tab Take 1 Tablet by mouth every day. 90 Tablet 3    fluticasone-salmeterol (ADVAIR) 250-50 MCG/ACT AEROSOL POWDER, BREATH ACTIVATED TAKE 1 PUFF BY MOUTH EVERY 12 HOURS 180 Each 3    omeprazole (PRILOSEC) 20 MG delayed-release capsule Take 1 Capsule by mouth every day. 90 Capsule 1    metoprolol tartrate (LOPRESSOR) 25 MG Tab TAKE 1 TABLET BY MOUTH TWICE A  Tablet 3    atorvastatin (LIPITOR) 80 MG tablet TAKE 1 TABLET BY MOUTH EVERY DAY IN THE EVENING 90 Tablet 3    Cholecalciferol 2000 UNIT Cap Take 5,000 Units by mouth. 3 times a week (M,W,F)      albuterol 108 (90 Base) MCG/ACT Aero Soln inhalation aerosol Inhale 2 Puffs every four hours as needed (Emergency Rescue  use for - Shortness of Breath, or Wheezing.). 1 Each 3    Probiotic Product (PROBIOTIC DAILY PO) Take  by mouth.      aspirin EC (ECOTRIN) 81 MG TBEC Take 1 Tab by mouth every day. 90 Tab 3     No current facility-administered medications for this visit.          Current supplements as per medication list.     Allergies: Iodine, Oxycodone-acetaminophen, Sulfa drugs, Food, and Nsaids    Current social contact/activities: no     She  reports that she quit smoking about 16 years ago. Her smoking use included cigarettes. She started smoking about 71 years ago. She has a 165 pack-year smoking history. She has never used smokeless tobacco. She reports that she does not drink alcohol and does not use drugs.  Counseling given: Not Answered  Tobacco comments: Quit 2007  (Hx 3ppd x 55yr)      ROS:    Gait: Uses a cane  Ostomy: No  Other tubes: No  Amputations: No  Chronic oxygen use: No  Last eye exam: 2020  Wears hearing aids: No   : Reports urinary leakage during the last 6 months that has somewhat interfered with their daily activities or sleep.    Screening:  Depression Screening  Little interest or pleasure in doing things?  0 - not at all  Feeling down, depressed , or hopeless? 0 - not at all  Patient Health Questionnaire Score: 0     If depressive symptoms identified deferred to follow up visit unless specifically addressed in assessment and plan.    Interpretation of PHQ-9 Total Score   Score Severity   1-4 No Depression   5-9 Mild Depression   10-14 Moderate Depression   15-19 Moderately Severe Depression   20-27 Severe Depression    Screening for Cognitive Impairment  Do you or any of your friends or family members have any concern about your memory? No  Three Minute Recall (Leader, Season, Table) 2/3    Abhijit clock face with all 12 numbers and set the hands to show 10 minutes after 11.  Yes 4/5  Cognitive concerns identified deferred for follow up unless specifically addressed in assessment and plan.    Fall Risk  Assessment  Has the patient had two or more falls in the last year or any fall with injury in the last year?  No    Safety Assessment  Do you always wear your seatbelt?  Yes  Any changes to home needed to function safely? No  Difficulty hearing.  Yes  Patient counseled about all safety risks that were identified.    Functional Assessment ADLs  Are there any barriers preventing you from cooking for yourself or meeting nutritional needs?  No.    Are there any barriers preventing you from driving safely or obtaining transportation?  No.    Are there any barriers preventing you from using a telephone or calling for help?  No    Are there any barriers preventing you from shopping?  No.    Are there any barriers preventing you from taking care of your own finances?  No    Are there any barriers preventing you from managing your medications?  No    Are there any barriers preventing you from showering, bathing or dressing yourself? No    Are there any barriers preventing you from doing housework or laundry? No  Are there any barriers preventing you from using the toilet?No  Are you currently engaging in any exercise or physical activity?  Yes.      Self-Assessment of Health  What is your perception of your health? Fair  Do you sleep more than six hours a night? Yes  In the past 7 days, how much did pain keep you from doing your normal work? Some  Do you spend quality time with family or friends (virtually or in person)? Yes  Do you usually eat a heart healthy diet that constists of a variety of fruits, vegetables, whole grains and fiber? Yes  Do you eat foods high in fat and/or Fast Food more than three times per week? No    Advance Care Planning  Do you have an Advance Directive, Living Will, Durable Power of , or POLST? No                 Health Maintenance Summary            Overdue - Diabetes: Retinopathy Screening (Yearly) Never done      No completion history exists for this topic.              Overdue -  Annual Wellness Visit (Yearly) Overdue since 12/15/2021      12/15/2020  Visit Dx: Medicare annual wellness visit, subsequent              Postponed - Zoster (Shingles) Vaccines (3 of 3) Postponed until 4/14/2024 09/12/2021  Imm Admin: Zoster Vaccine Recombinant (RZV) (SHINGRIX)    08/23/2012  Imm Admin: Zoster Vaccine Live (ZVL) (Zostavax) - HISTORICAL DATA              Postponed - Bone Density Scan (Every 2 Years) Postponed until 11/14/2024 09/22/2016  Done              Postponed - Annual Pulmonary Function Test / Spirometry (Yearly) Postponed until 11/14/2024 12/12/2017  PFT DICTATED RESULTS    10/18/2016  Done              A1c Screening (Every 6 Months) Next due on 5/14/2024 11/14/2023  POCT  A1C    06/08/2023  POCT  A1C    06/14/2022  POCT  A1C    12/01/2021  HEMOGLOBIN A1C    05/28/2021  HEMOGLOBIN A1C    Only the first 5 history entries have been loaded, but more history exists.              Fasting Lipid Profile (Yearly) Next due on 10/30/2024      10/30/2023  Lipid Profile    05/28/2021  Lipid Profile    05/21/2019  Lipid Profile    04/11/2018  LIPID PROFILE    04/11/2017  LIPID PROFILE    Only the first 5 history entries have been loaded, but more history exists.              Ordered - Diabetes: Urine Protein Screening (Yearly) Ordered on 3/5/2024      02/21/2024  MICROALBUMIN CREAT RATIO URINE    02/21/2024  PROTEIN/CREAT RATIO URINE    12/07/2023  MICROALBUMIN CREAT RATIO URINE    12/07/2023  PROTEIN/CREAT RATIO URINE    05/30/2023  MICROALBUMIN CREAT RATIO URINE    Only the first 5 history entries have been loaded, but more history exists.              Ordered - SERUM CREATININE (Yearly) Ordered on 3/5/2024      02/21/2024  Basic Metabolic Panel    02/08/2024  Basic Metabolic Panel    12/07/2023  Basic Metabolic Panel    06/16/2023  Basic Metabolic Panel    05/30/2023  Basic Metabolic Panel    Only the first 5 history entries have been loaded, but more history exists.               Diabetes: Monofilament / LE Exam (Yearly) Tentatively due on 3/19/2025      03/19/2024  Diabetic Monofilament LE Exam    12/15/2022  Diabetic Monofilament LE Exam    12/14/2022  [employer]: WORKFLOW - DIABETES - DIABETIC FOOT EXAM PERFORMED              IMM DTaP/Tdap/Td Vaccine (3 - Td or Tdap) Next due on 3/19/2034      03/19/2024  Imm Admin: Tdap Vaccine    08/23/2012  Imm Admin: Tdap Vaccine              Pneumococcal Vaccine: 65+ Years (Series Information) Completed      09/09/2015  Imm Admin: Pneumococcal Conjugate Vaccine (Prevnar/PCV-13)    08/31/2005  Imm Admin: Pneumococcal polysaccharide vaccine (PPSV-23)              Influenza Vaccine (Series Information) Completed      08/23/2023  Imm Admin: Influenza Vaccine Adult HD    08/29/2022  Imm Admin: Influenza Vaccine Adult HD    08/13/2021  Imm Admin: Influenza Vaccine Adult HD    10/15/2020  Imm Admin: Influenza Vaccine Quad Inj (Pf)    09/12/2019  Imm Admin: Influenza Vaccine Adult HD    Only the first 5 history entries have been loaded, but more history exists.              COVID-19 Vaccine (Series Information) Completed      10/17/2023  Imm Admin: Covid-19 Mrna (Spikevax) Moderna 12+ Years    10/06/2022  Imm Admin: MODERNA BIVALENT BOOSTER SARS-COV-2 VACCINE (6+)    04/09/2022  Imm Admin: MODERNA SARS-COV-2 VACCINE (12+)    11/19/2021  Imm Admin: Asaf SARS-CoV-2 Vaccine    06/17/2021  Imm Admin: Asaf SARS-CoV-2 Vaccine    Only the first 5 history entries have been loaded, but more history exists.              Hepatitis A Vaccine (Hep A) (Series Information) Aged Out      No completion history exists for this topic.              HPV Vaccines (Series Information) Aged Out      No completion history exists for this topic.              Polio Vaccine (Inactivated Polio) (Series Information) Aged Out      No completion history exists for this topic.              Meningococcal Immunization (Series Information) Aged Out      No completion history exists for  this topic.              Discontinued - Hepatitis B Vaccine (Hep B)  Discontinued      No completion history exists for this topic.                    Patient Care Team:  Christianne Zarate D.N.P. as PCP - General (Nurse Practitioner Family)  Kyle Stanley M.D. (Inactive) (Interventional Cardiology)        Social History     Tobacco Use    Smoking status: Former     Current packs/day: 0.00     Average packs/day: 3.0 packs/day for 55.0 years (165.0 ttl pk-yrs)     Types: Cigarettes     Start date: 1952     Quit date: 2007     Years since quittin.5    Smokeless tobacco: Never    Tobacco comments:     Quit   (Hx 3ppd x 55yr)   Vaping Use    Vaping Use: Never used   Substance Use Topics    Alcohol use: No     Alcohol/week: 0.0 oz    Drug use: No     Family History   Problem Relation Age of Onset    Stroke Mother     Hyperlipidemia Mother     Hypertension Mother     Lung Disease Mother         smoker    Heart Disease Father     Hypertension Father     Hyperlipidemia Father     Lung Disease Father         smoker    Alcohol/Drug Father         etoh    Other Father         aneurysm    Kidney Disease Neg Hx      She  has a past medical history of Anemia, Anxiety, Arrhythmia, Arthritis, Blood transfusion, without reported diagnosis, CHF (congestive heart failure) (HCC), Chronic airway obstruction, not elsewhere classified, Depression, Emphysema, GERD (gastroesophageal reflux disease), Headache, classical migraine, Heart attack (HCC), Heart murmur, Hyperlipidemia, Hypertension, IBD (inflammatory bowel disease), Kidney disease, Ulcer, and Unspecified cataract.   Past Surgical History:   Procedure Laterality Date    MULTIPLE CORONARY ARTERY BYPASS ENDO VEIN HARVEST  2014    Performed by Stephen Nowak M.D. at SURGERY [facility]    BLADDER NECK CONTRACTURE EXICISION  1970    GANGLION EXCISION      left wrist    ABDOMINAL EXPLORATION      ABDOMINAL HYSTERECTOMY TOTAL      fibroids    APPENDECTOMY       DENTAL EXTRACTION(S)         Exam:   /70 (BP Location: Left arm, Patient Position: Sitting, BP Cuff Size: Adult)   Pulse 62   Temp 37.1 °C (98.7 °F)   Resp 16   Ht 1.524 m (5')   Wt 67.6 kg (149 lb)   SpO2 96%  Body mass index is 29.1 kg/m².    Hearing good.    Dentition good  Alert, oriented in no acute distress.  Eye contact is good, speech goal directed, affect calm    Physical Exam  Constitutional:       Appearance: Normal appearance.   Cardiovascular:      Rate and Rhythm: Normal rate and regular rhythm.      Pulses: Normal pulses.           Dorsalis pedis pulses are 2+ on the right side and 2+ on the left side.      Heart sounds: Normal heart sounds.   Pulmonary:      Effort: Pulmonary effort is normal.      Breath sounds: Normal breath sounds.   Musculoskeletal:      Right lower leg: No edema.      Left lower leg: No edema.        Feet:       Comments: Ambulates with cane, wheelchair assistance for long distance   Feet:      Right foot:      Skin integrity: Skin integrity normal.      Toenail Condition: Right toenails are abnormally thick. Fungal disease present.     Left foot:      Skin integrity: Skin integrity normal.   Skin:     General: Skin is warm and dry.   Neurological:      General: No focal deficit present.      Mental Status: She is alert and oriented to person, place, and time.       Monofilament testing with a 10 gram force: sensation intact: intact bilaterally  Visual Inspection: Feet without maceration, ulcers, fissures.  Pedal pulses: intact bilaterally    Assessment and Plan. The following treatment and monitoring plan is recommended:    Problem List Items Addressed This Visit       Chronic diastolic congestive heart failure (HCC)     Chronic condition; Followed by Cardiology; Also with Hx CABG 2014, hypertension. Last echo done in 2018- EF 60%.  Taking Metoprolol 25mg QD, Lasix 20mg QD  - counseled on low salt diet, especially around the holidays  - monitor CMP Q 6 months          Dyslipidemia      Latest Reference Range & Units 10/30/23 09:49   Cholesterol,Tot 100 - 199 mg/dL 169   Triglycerides 0 - 149 mg/dL 64   HDL >=40 mg/dL 72   LDL <100 mg/dL 84   Chronic. Tolerating statin; LDL goal <100; continue plan per cardiology;   - Atorvastatin 80mg QD, daily ASA  - monitor annual fasting lipid         COPD (chronic obstructive pulmonary disease) ([facility])     Switched from Symbicort to Advair last year 2021 due to insurance coverage/cost; states doing well, oxygen level at 96-98% on room air.          Essential hypertension     Chronic; BP goal < 130/80  BP in clinic today 126/70; no CP, palpitations, dizziness reported today. Edema in LE, this is chronic for her; lasix was increased to 80mg at her last appt with nephrology in 6/2023. Continue management per cardiology  - Amlodipine 10mg QD, Lopressor 25mg BID  - Also on Lasix 80mg QD for HF         CAD - Coronary artery disease involving native coronary artery of native heart without angina pectoris     Chronic, stable s/p CABG 2014  Followed by cardiology; On daily Atorvastatin 80mg & ASA   BP managed on Amlodipine 10mg; Lisinopril was d/faith per nephr due to hyperkalemia.   HF managed on Metoprolol 25mg BID, Lasix 20mg QD          CKD (chronic kidney disease) stage 4, GFR 15-29 ml/min ([facility])     GFR 31  Followed by nephrology  BP managed on Amlodipine 10mg; Lisinopril was d/faith per nephr due to hyperkalemia.   HF managed on Metoprolol 25mg BID, Lasix 80mg QD  Prediabetes is diet controlled  - avoid nephrotoxins         Prediabetes     A1C at 6.1 in 11/2023  - continue with low sugar, reduced carb intake  - monitor A1C Q 6 months  - declined retinal screen today; we will get it done at next visit         Relevant Orders    Diabetic Monofilament LE Exam (Completed)    Fungal nail infection     Right big toe nail thickened and yellow. No pain. She is applying OTC topical antifungal treatment          Other Visit Diagnoses       Need for  vaccination        I have placed the below orders and discussed them with an approved delegating provider.The MA is performing the below orders under the direction of Dr Montenegro.    Relevant Orders    Tdap Vaccine =>8YO IM (Completed)    Encounter for annual wellness visit (AWV) in Medicare patient              Services suggested: No services needed at this time  Health Care Screening: Age-appropriate preventive services recommended by USPTF and ACIP covered by Medicare were discussed today. Services ordered if indicated and agreed upon by the patient.  Referrals offered: Community-based lifestyle interventions to reduce health risks and promote self-management and wellness, fall prevention, nutrition, physical activity, tobacco-use cessation, weight loss, and mental health services as per orders if indicated.    Discussion today about general wellness and lifestyle habits:    Prevent falls and reduce trip hazards; Cautioned about securing or removing rugs.  Have a working fire alarm and carbon monoxide detector;   Engage in regular physical activity and social activities     Follow-up: Return in about 6 months (around 9/19/2024).

## 2024-03-19 NOTE — ASSESSMENT & PLAN NOTE
Chronic, stable s/p CABG 2014  Followed by cardiology; On daily Atorvastatin 80mg & ASA   BP managed on Amlodipine 10mg; Lisinopril was d/faith per nephr due to hyperkalemia.   HF managed on Metoprolol 25mg BID, Lasix 20mg QD

## 2024-03-19 NOTE — ASSESSMENT & PLAN NOTE
A1C at 6.1 in 11/2023  - continue with low sugar, reduced carb intake  - monitor A1C Q 6 months  - declined retinal screen today; we will get it done at next visit

## 2024-03-19 NOTE — ASSESSMENT & PLAN NOTE
GFR 31  Followed by nephrology  BP managed on Amlodipine 10mg; Lisinopril was d/faith per nephr due to hyperkalemia.   HF managed on Metoprolol 25mg BID, Lasix 80mg QD  Prediabetes is diet controlled  - avoid nephrotoxins

## 2024-03-19 NOTE — ASSESSMENT & PLAN NOTE
Chronic; BP goal < 130/80  BP in clinic today 126/70; no CP, palpitations, dizziness reported today. Edema in LE, this is chronic for her; lasix was increased to 80mg at her last appt with nephrology in 6/2023. Continue management per cardiology  - Amlodipine 10mg QD, Lopressor 25mg BID  - Also on Lasix 80mg QD for HF

## 2024-03-19 NOTE — ASSESSMENT & PLAN NOTE
Latest Reference Range & Units 10/30/23 09:49   Cholesterol,Tot 100 - 199 mg/dL 169   Triglycerides 0 - 149 mg/dL 64   HDL >=40 mg/dL 72   LDL <100 mg/dL 84     Chronic. Tolerating statin; LDL goal <100; continue plan per cardiology;   - Atorvastatin 80mg QD, daily ASA  - monitor annual fasting lipid

## 2024-03-19 NOTE — ASSESSMENT & PLAN NOTE
Switched from Symbicort to Advair last year 2021 due to insurance coverage/cost; states doing well, oxygen level at 96-98% on room air.

## 2024-03-19 NOTE — ASSESSMENT & PLAN NOTE
Right big toe nail thickened and yellow. No pain. She is applying OTC topical antifungal treatment

## 2024-03-19 NOTE — ASSESSMENT & PLAN NOTE
Chronic condition; Followed by Cardiology; Also with Hx CABG 2014, hypertension. Last echo done in 2018- EF 60%.  Taking Metoprolol 25mg QD, Lasix 20mg QD  - counseled on low salt diet, especially around the holidays  - monitor CMP Q 6 months

## 2024-03-20 DIAGNOSIS — E78.5 DYSLIPIDEMIA: ICD-10-CM

## 2024-03-20 RX ORDER — ATORVASTATIN CALCIUM 80 MG/1
80 TABLET, FILM COATED ORAL EVERY EVENING
Qty: 100 TABLET | Refills: 3 | Status: SHIPPED | OUTPATIENT
Start: 2024-03-20

## 2024-03-20 NOTE — TELEPHONE ENCOUNTER
Received request via: Pharmacy    Was the patient seen in the last year in this department? Yes    Does the patient have an active prescription (recently filled or refills available) for medication(s) requested? Yes.     Ph[facility] Name[facility]    Does the patient have [facility] Plus and need 100 day supply (blood pressure, diabetes and cholesterol meds only)? Yes, quantity updated to 100 days

## 2024-03-26 DIAGNOSIS — J43.1 PANLOBULAR EMPHYSEMA (HCC): ICD-10-CM

## 2024-03-26 RX ORDER — ALBUTEROL SULFATE 90 UG/1
2 AEROSOL, METERED RESPIRATORY (INHALATION) EVERY 4 HOURS PRN
Qty: 1 EACH | Refills: 0 | Status: SHIPPED | OUTPATIENT
Start: 2024-03-26

## 2024-03-26 NOTE — TELEPHONE ENCOUNTER
Received request via: Pharmacy    Was the patient seen in the last year in this department? Yes    Does the patient have an active prescription (recently filled or refills available) for medication(s) requested? No    [facility]    Does the patient have assisted Plus and need 100 day supply (blood pressure, diabetes and cholesterol meds only)? Medication is not for cholesterol, blood pressure or diabetes

## 2024-05-31 DIAGNOSIS — K21.9 GASTROESOPHAGEAL REFLUX DISEASE WITHOUT ESOPHAGITIS: ICD-10-CM

## 2024-05-31 RX ORDER — OMEPRAZOLE 20 MG/1
20 CAPSULE, DELAYED RELEASE ORAL
Qty: 90 CAPSULE | Refills: 1 | Status: SHIPPED | OUTPATIENT
Start: 2024-05-31

## 2024-06-05 DIAGNOSIS — E55.9 VITAMIN D DEFICIENCY: ICD-10-CM

## 2024-06-05 DIAGNOSIS — K21.9 GASTROESOPHAGEAL REFLUX DISEASE WITHOUT ESOPHAGITIS: ICD-10-CM

## 2024-06-05 DIAGNOSIS — E78.5 DYSLIPIDEMIA: ICD-10-CM

## 2024-06-05 DIAGNOSIS — R60.9 EDEMA, UNSPECIFIED TYPE: ICD-10-CM

## 2024-06-05 DIAGNOSIS — I50.32 CHRONIC DIASTOLIC CONGESTIVE HEART FAILURE (HCC): ICD-10-CM

## 2024-06-05 DIAGNOSIS — I10 ESSENTIAL HYPERTENSION: Chronic | ICD-10-CM

## 2024-06-06 RX ORDER — AMLODIPINE BESYLATE 10 MG/1
10 TABLET ORAL DAILY
Qty: 90 TABLET | Refills: 3 | Status: SHIPPED | OUTPATIENT
Start: 2024-06-06

## 2024-06-06 RX ORDER — CHOLECALCIFEROL (VITAMIN D3) 125 MCG
5000 CAPSULE ORAL
Qty: 30 CAPSULE | Refills: 3 | Status: SHIPPED | OUTPATIENT
Start: 2024-06-07

## 2024-06-06 RX ORDER — ATORVASTATIN CALCIUM 80 MG/1
80 TABLET, FILM COATED ORAL EVERY EVENING
Qty: 100 TABLET | Refills: 3 | Status: SHIPPED | OUTPATIENT
Start: 2024-06-06

## 2024-06-06 RX ORDER — OMEPRAZOLE 20 MG/1
20 CAPSULE, DELAYED RELEASE ORAL
Qty: 90 CAPSULE | Refills: 1 | Status: SHIPPED | OUTPATIENT
Start: 2024-06-06

## 2024-06-06 RX ORDER — FUROSEMIDE 80 MG
80 TABLET ORAL DAILY
Qty: 90 TABLET | Refills: 3 | Status: SHIPPED | OUTPATIENT
Start: 2024-06-06

## 2024-06-06 NOTE — TELEPHONE ENCOUNTER
Received request via: Patient    Was the patient seen in the last year in this department? Yes    Does the patient have an active prescription (recently filled or refills available) for medication(s) requested? YES    Pharmacy Name: [facility]/PHARMACY #[id number] - [place], [place] - [address] AT [facility] [[id number]]    Does the patient have retirement Plus and need 100 day supply (blood pressure, diabetes and cholesterol meds only)? Yes, quantity updated to 100 days   Requested Prescriptions     Pending Prescriptions Disp Refills    omeprazole (PRILOSEC) 20 MG delayed-release capsule 90 Capsule 1     Sig: Take 1 Capsule by mouth every day.    metoprolol tartrate (LOPRESSOR) 25 MG Tab 180 Tablet 3     Sig: Take 1 Tablet by mouth 2 times a day.    furosemide (LASIX) 80 MG Tab 90 Tablet 3     Sig: Take 1 Tablet by mouth every day.    Cholecalciferol 2000 UNIT Cap       Sig: Take 3 Capsules by mouth. 3 times a week (M,W,F)    atorvastatin (LIPITOR) 80 MG tablet 100 Tablet 3     Sig: Take 1 Tablet by mouth every evening.    amLODIPine (NORVASC) 10 MG Tab 90 Tablet 3     Sig: Take 1 Tablet by mouth every day.

## 2024-07-05 ENCOUNTER — HOSPITAL ENCOUNTER (OUTPATIENT)
Dept: LAB | Facility: MEDICAL CENTER | Age: 84
End: 2024-07-05
Attending: INTERNAL MEDICINE
Payer: MEDICARE

## 2024-07-05 DIAGNOSIS — R30.0 DYSURIA: ICD-10-CM

## 2024-07-05 DIAGNOSIS — N25.81 SECONDARY HYPERPARATHYROIDISM OF RENAL ORIGIN (HCC): ICD-10-CM

## 2024-07-05 DIAGNOSIS — I10 ESSENTIAL HYPERTENSION: ICD-10-CM

## 2024-07-05 DIAGNOSIS — N18.4 CKD (CHRONIC KIDNEY DISEASE) STAGE 4, GFR 15-29 ML/MIN (HCC): ICD-10-CM

## 2024-07-05 LAB
25(OH)D3 SERPL-MCNC: 45 NG/ML (ref 30–100)
ALBUMIN SERPL BCP-MCNC: 4.3 G/DL (ref 3.2–4.9)
ANION GAP SERPL CALC-SCNC: 13 MMOL/L (ref 7–16)
APPEARANCE UR: ABNORMAL
BACTERIA #/AREA URNS HPF: ABNORMAL /HPF
BILIRUB UR QL STRIP.AUTO: NEGATIVE
BUN SERPL-MCNC: 44 MG/DL (ref 8–22)
CALCIUM SERPL-MCNC: 9.2 MG/DL (ref 8.5–10.5)
CHLORIDE SERPL-SCNC: 101 MMOL/L (ref 96–112)
CO2 SERPL-SCNC: 23 MMOL/L (ref 20–33)
COLOR UR: YELLOW
CREAT SERPL-MCNC: 1.6 MG/DL (ref 0.5–1.4)
CREAT UR-MCNC: 24.86 MG/DL
CREAT UR-MCNC: 25.54 MG/DL
EPI CELLS #/AREA URNS HPF: ABNORMAL /HPF
ERYTHROCYTE [DISTWIDTH] IN BLOOD BY AUTOMATED COUNT: 51.4 FL (ref 35.9–50)
GFR SERPLBLD CREATININE-BSD FMLA CKD-EPI: 31 ML/MIN/1.73 M 2
GLUCOSE SERPL-MCNC: 101 MG/DL (ref 65–99)
GLUCOSE UR STRIP.AUTO-MCNC: NEGATIVE MG/DL
HCT VFR BLD AUTO: 35.5 % (ref 37–47)
HGB BLD-MCNC: 11.6 G/DL (ref 12–16)
HYALINE CASTS #/AREA URNS LPF: ABNORMAL /LPF
KETONES UR STRIP.AUTO-MCNC: NEGATIVE MG/DL
LEUKOCYTE ESTERASE UR QL STRIP.AUTO: ABNORMAL
MCH RBC QN AUTO: 30.7 PG (ref 27–33)
MCHC RBC AUTO-ENTMCNC: 32.7 G/DL (ref 32.2–35.5)
MCV RBC AUTO: 93.9 FL (ref 81.4–97.8)
MICRO URNS: ABNORMAL
MICROALBUMIN UR-MCNC: <1.2 MG/DL
MICROALBUMIN/CREAT UR: NORMAL MG/G (ref 0–30)
NITRITE UR QL STRIP.AUTO: NEGATIVE
PH UR STRIP.AUTO: 6 [PH] (ref 5–8)
PHOSPHATE SERPL-MCNC: 4.6 MG/DL (ref 2.5–4.5)
PLATELET # BLD AUTO: 302 K/UL (ref 164–446)
PMV BLD AUTO: 10 FL (ref 9–12.9)
POTASSIUM SERPL-SCNC: 4.9 MMOL/L (ref 3.6–5.5)
PROT UR QL STRIP: NEGATIVE MG/DL
PROT UR-MCNC: 7 MG/DL (ref 0–15)
PROT/CREAT UR: 282 MG/G (ref 10–107)
PTH-INTACT SERPL-MCNC: 206 PG/ML (ref 14–72)
RBC # BLD AUTO: 3.78 M/UL (ref 4.2–5.4)
RBC # URNS HPF: ABNORMAL /HPF
RBC UR QL AUTO: ABNORMAL
SODIUM SERPL-SCNC: 137 MMOL/L (ref 135–145)
SP GR UR STRIP.AUTO: 1.01
UROBILINOGEN UR STRIP.AUTO-MCNC: 0.2 MG/DL
WBC # BLD AUTO: 8.9 K/UL (ref 4.8–10.8)
WBC #/AREA URNS HPF: ABNORMAL /HPF

## 2024-07-05 PROCEDURE — 82043 UR ALBUMIN QUANTITATIVE: CPT

## 2024-07-05 PROCEDURE — 82306 VITAMIN D 25 HYDROXY: CPT

## 2024-07-05 PROCEDURE — 85027 COMPLETE CBC AUTOMATED: CPT

## 2024-07-05 PROCEDURE — 81001 URINALYSIS AUTO W/SCOPE: CPT

## 2024-07-05 PROCEDURE — 87186 SC STD MICRODIL/AGAR DIL: CPT

## 2024-07-05 PROCEDURE — 82040 ASSAY OF SERUM ALBUMIN: CPT

## 2024-07-05 PROCEDURE — 82570 ASSAY OF URINE CREATININE: CPT

## 2024-07-05 PROCEDURE — 36415 COLL VENOUS BLD VENIPUNCTURE: CPT

## 2024-07-05 PROCEDURE — 80048 BASIC METABOLIC PNL TOTAL CA: CPT

## 2024-07-05 PROCEDURE — 83970 ASSAY OF PARATHORMONE: CPT

## 2024-07-05 PROCEDURE — 84156 ASSAY OF PROTEIN URINE: CPT

## 2024-07-05 PROCEDURE — 87077 CULTURE AEROBIC IDENTIFY: CPT

## 2024-07-05 PROCEDURE — 87086 URINE CULTURE/COLONY COUNT: CPT

## 2024-07-05 PROCEDURE — 84100 ASSAY OF PHOSPHORUS: CPT

## 2024-07-11 ENCOUNTER — OFFICE VISIT (OUTPATIENT)
Dept: NEPHROLOGY | Facility: MEDICAL CENTER | Age: 84
End: 2024-07-11
Payer: MEDICARE

## 2024-07-11 VITALS
SYSTOLIC BLOOD PRESSURE: 106 MMHG | WEIGHT: 147 LBS | DIASTOLIC BLOOD PRESSURE: 64 MMHG | TEMPERATURE: 98.4 F | BODY MASS INDEX: 28.86 KG/M2 | HEIGHT: 60 IN | HEART RATE: 55 BPM | OXYGEN SATURATION: 92 %

## 2024-07-11 DIAGNOSIS — I50.32 CHRONIC DIASTOLIC CONGESTIVE HEART FAILURE (HCC): ICD-10-CM

## 2024-07-11 DIAGNOSIS — N18.4 CKD (CHRONIC KIDNEY DISEASE) STAGE 4, GFR 15-29 ML/MIN (HCC): ICD-10-CM

## 2024-07-11 DIAGNOSIS — D64.9 ANEMIA, UNSPECIFIED TYPE: ICD-10-CM

## 2024-07-11 DIAGNOSIS — E21.3 HYPERPARATHYROIDISM (HCC): ICD-10-CM

## 2024-07-11 DIAGNOSIS — Z87.440 HISTORY OF UTI: ICD-10-CM

## 2024-07-11 DIAGNOSIS — I10 ESSENTIAL HYPERTENSION: ICD-10-CM

## 2024-07-11 PROCEDURE — 99214 OFFICE O/P EST MOD 30 MIN: CPT | Performed by: INTERNAL MEDICINE

## 2024-07-11 PROCEDURE — 3078F DIAST BP <80 MM HG: CPT | Performed by: INTERNAL MEDICINE

## 2024-07-11 PROCEDURE — 3074F SYST BP LT 130 MM HG: CPT | Performed by: INTERNAL MEDICINE

## 2024-07-11 PROCEDURE — G2211 COMPLEX E/M VISIT ADD ON: HCPCS | Performed by: INTERNAL MEDICINE

## 2024-07-11 ASSESSMENT — ENCOUNTER SYMPTOMS
FEVER: 0
COUGH: 1
SHORTNESS OF BREATH: 1
ABDOMINAL PAIN: 0

## 2024-07-11 ASSESSMENT — FIBROSIS 4 INDEX: FIB4 SCORE: 1.47

## 2024-09-12 ENCOUNTER — OFFICE VISIT (OUTPATIENT)
Dept: MEDICAL GROUP | Facility: PHYSICIAN GROUP | Age: 84
End: 2024-09-12
Payer: MEDICARE

## 2024-09-12 VITALS
BODY MASS INDEX: 34.69 KG/M2 | OXYGEN SATURATION: 92 % | TEMPERATURE: 98 F | HEART RATE: 65 BPM | WEIGHT: 154.2 LBS | DIASTOLIC BLOOD PRESSURE: 56 MMHG | HEIGHT: 56 IN | SYSTOLIC BLOOD PRESSURE: 120 MMHG

## 2024-09-12 DIAGNOSIS — E78.5 DYSLIPIDEMIA: ICD-10-CM

## 2024-09-12 DIAGNOSIS — K21.9 GASTROESOPHAGEAL REFLUX DISEASE WITHOUT ESOPHAGITIS: ICD-10-CM

## 2024-09-12 DIAGNOSIS — Z00.00 ROUTINE HEALTH MAINTENANCE: ICD-10-CM

## 2024-09-12 DIAGNOSIS — I77.810 THORACIC AORTIC ECTASIA (HCC): ICD-10-CM

## 2024-09-12 DIAGNOSIS — Z76.89 ESTABLISHING CARE WITH NEW DOCTOR, ENCOUNTER FOR: ICD-10-CM

## 2024-09-12 DIAGNOSIS — R73.03 PREDIABETES: ICD-10-CM

## 2024-09-12 DIAGNOSIS — I50.32 CHRONIC DIASTOLIC CONGESTIVE HEART FAILURE (HCC): ICD-10-CM

## 2024-09-12 DIAGNOSIS — N18.32 STAGE 3B CHRONIC KIDNEY DISEASE: ICD-10-CM

## 2024-09-12 DIAGNOSIS — J43.1 PANLOBULAR EMPHYSEMA (HCC): ICD-10-CM

## 2024-09-12 DIAGNOSIS — I10 ESSENTIAL HYPERTENSION: ICD-10-CM

## 2024-09-12 LAB
HBA1C MFR BLD: 6.2 % (ref ?–5.8)
POCT INT CON NEG: NEGATIVE
POCT INT CON POS: POSITIVE

## 2024-09-12 PROCEDURE — 83036 HEMOGLOBIN GLYCOSYLATED A1C: CPT | Performed by: NURSE PRACTITIONER

## 2024-09-12 PROCEDURE — 3074F SYST BP LT 130 MM HG: CPT | Performed by: NURSE PRACTITIONER

## 2024-09-12 PROCEDURE — 99215 OFFICE O/P EST HI 40 MIN: CPT | Performed by: NURSE PRACTITIONER

## 2024-09-12 PROCEDURE — 3078F DIAST BP <80 MM HG: CPT | Performed by: NURSE PRACTITIONER

## 2024-09-12 ASSESSMENT — FIBROSIS 4 INDEX: FIB4 SCORE: 1.49

## 2024-09-15 PROBLEM — I77.810 THORACIC AORTIC ECTASIA (HCC): Status: ACTIVE | Noted: 2024-09-15

## 2024-09-15 PROBLEM — Z00.00 HEALTHCARE MAINTENANCE: Status: RESOLVED | Noted: 2021-05-18 | Resolved: 2024-09-15

## 2024-09-15 PROBLEM — B35.1 FUNGAL NAIL INFECTION: Status: RESOLVED | Noted: 2024-03-19 | Resolved: 2024-09-15

## 2024-09-15 PROBLEM — N30.00 ACUTE CYSTITIS WITHOUT HEMATURIA: Status: RESOLVED | Noted: 2022-06-14 | Resolved: 2024-09-15

## 2024-09-15 PROBLEM — R09.89 TONSIL PAIN: Status: RESOLVED | Noted: 2021-05-18 | Resolved: 2024-09-15

## 2024-09-16 NOTE — PROGRESS NOTES
CC:   Chief Complaint   Patient presents with    [facility]   Verbal consent was acquired by the patient to use [employer] ambient listening note generation during this visit Yes      HISTORY OF THE PRESENT ILLNESS: Patient is a 84 y.o. female. This pleasant patient is here today to establish care and discuss multiple issues as listed below.     Health Maintenance: Reviewed    History of Present Illness  The patient is an 84-year-old female who presents to [facility].    She has been experiencing heartburn recently, which is unusual for her. Despite taking omeprazole, the heartburn persists.    She underwent a CABG procedure about 10 years ago and has not had a cardiology consultation in some time. She also reports frequent bruising on her arms, which she attributes to daily aspirin use.    She has a history of anemia, anxiety, arthritis, congestive heart failure, asthma, COPD, depression, high cholesterol, high blood pressure, and kidney disease. She reports no history of inflammatory bowel disease or cataracts.    Her surgical history includes a complete hysterectomy at age 40 due to fibroids, ganglion cyst removal, dental extractions, bladder neck contracture excision, and appendectomy.     She has been advised against using Advil due to its potential impact on her kidneys. She uses albuterol as an emergency inhaler but reports that it causes palpitations and shakiness. She takes Wixela twice daily, which she finds helpful. She has a small bottle of Tylenol for back pain but tries to avoid taking it.    She does not regularly see an eye doctor but plans to do so this year. She has been wearing the same glasses for several years and reports good vision with them but struggles with near vision without them.    SOCIAL HISTORY  She started smoking when she was 14 or 15 and quit in 2007. She does not drink alcohol or do any drugs. She is . She was  3 times. She worked as a  in the  [employer] business. She has no pets now.    FAMILY HISTORY  Her mother passed away at age 75. She had heart problems, high blood pressure, high cholesterol, and stroke. Her father passed away at age 70. He had heart disease, high blood pressure, high cholesterol, and aneurysm. He had a major surgery on that aneurysm. He was alcoholic. Her son  3 years ago and her daughter  in . Both of them were drinkers. Her son had cirrhosis of the liver and kidney problems. He passed away due to alcoholism. Her daughter passed away due to carbon monoxide poisoning. Her maternal grandmother had cataracts and heart problems. She passed away after falling in the yard and breaking her hip. Her paternal grandmother had a blood clot in the leg. Her paternal grandfather committed suicide.     Allergies: Iodine, Oxycodone-acetaminophen, Sulfa drugs, Food, and Nsaids  Current Outpatient Medications Ordered in Epic   Medication Sig Dispense Refill    omeprazole (PRILOSEC) 20 MG delayed-release capsule Take 1 Capsule by mouth every day. 90 Capsule 1    metoprolol tartrate (LOPRESSOR) 25 MG Tab Take 1 Tablet by mouth 2 times a day. 180 Tablet 3    furosemide (LASIX) 80 MG Tab Take 1 Tablet by mouth every day. 90 Tablet 3    atorvastatin (LIPITOR) 80 MG tablet Take 1 Tablet by mouth every evening. 100 Tablet 3    amLODIPine (NORVASC) 10 MG Tab Take 1 Tablet by mouth every day. 90 Tablet 3    Cholecalciferol (VITAMIN D) 125 MCG (5000 UT) Cap Take 5,000 Units by mouth every Monday, Wednesday, and Friday. 30 Capsule 3    albuterol 108 (90 Base) MCG/ACT Aero Soln inhalation aerosol Inhale 2 Puffs every four hours as needed (Emergency Rescue use for - Shortness of Breath, or Wheezing.). 1 Each 0    fluticasone-salmeterol (ADVAIR) 250-50 MCG/ACT AEROSOL POWDER, BREATH ACTIVATED TAKE 1 PUFF BY MOUTH EVERY 12 HOURS 180 Each 3    Probiotic Product (PROBIOTIC DAILY PO) Take  by mouth.      aspirin EC (ECOTRIN) 81 MG TBEC Take 1 Tab by  mouth every day. 90 Tab 3     No current Epic-ordered facility-administered medications on file.     Past Medical History:   Diagnosis Date    Anemia     Anxiety     Arrhythmia     Arthritis     Blood transfusion, without reported diagnosis     CHF (congestive heart failure) (HCC)     Chronic airway obstruction, not elsewhere classified     Depression     Emphysema     GERD (gastroesophageal reflux disease)     Headache, classical migraine     Heart attack (HCC)     Heart murmur     Hyperlipidemia     Hypertension     IBD (inflammatory bowel disease)     Kidney disease     Ulcer     Unspecified cataract      Past Surgical History:   Procedure Laterality Date    MULTIPLE CORONARY ARTERY BYPASS ENDO VEIN HARVEST  2014    Performed by Stephen Nowak M.D. at SURGERY [facility]S    BLADDER NECK CONTRACTURE EXICISION  1970    GANGLION EXCISION      left wrist    ABDOMINAL EXPLORATION      ABDOMINAL HYSTERECTOMY TOTAL      fibroids 40    APPENDECTOMY      DENTAL EXTRACTION(S)       Social History     Tobacco Use    Smoking status: Former     Current packs/day: 0.00     Average packs/day: 3.0 packs/day for 52.0 years (156.0 ttl pk-yrs)     Types: Cigarettes     Start date: 1955     Quit date: 2007     Years since quittin.0     Passive exposure: Past    Smokeless tobacco: Never    Tobacco comments:     Quit    Vaping Use    Vaping status: Never Used   Substance Use Topics    Alcohol use: No     Alcohol/week: 0.0 oz    Drug use: No     Social History     Social History Narrative    Not on file     Family History   Problem Relation Age of Onset    Stroke Mother     Hyperlipidemia Mother     Hypertension Mother     Lung Disease Mother         smoker    Other Father         thoracic aortic aneurysm    Alcohol abuse Father     Heart Disease Father     Hypertension Father     Hyperlipidemia Father     Lung Disease Father         smoker    Abdominal aortic aneurysm Father     Parkinson's Disease  "Maternal Aunt     Heart Disease Paternal Aunt     No Known Problems Paternal Aunt     No Known Problems Paternal Aunt     No Known Problems Paternal Aunt     No Known Problems Paternal Aunt     No Known Problems Paternal Uncle     No Known Problems Paternal Uncle     Heart Disease Maternal Grandmother     No Known Problems Maternal Grandfather     DVT Paternal Grandmother     Suicide Attempts Paternal Grandfather     Alcohol abuse Daughter     Alcohol abuse Son     GI Disease Son         Liver cirrhosis     ROS:   Constitutional: No fevers, chills, malaise/fatigue.  Eyes: No eye pain.  ENT: No sore throat, congestion.   Resp: No cough, shortness of breath.  CV: No chest pain, leg swelling, palpitations.  GI: No nausea/vomiting, abdominal pain, constipation, diarrhea.  : No dysuria, hematuria.  MSK: No weakness.  Skin: No rashes.  Neuro: No dizziness, weakness, headaches.  Psych: No suicidal ideations.    All remaining systems reviewed and found to be negative, except as stated above.        Exam: /56 (BP Location: Right arm, Patient Position: Sitting, BP Cuff Size: Adult)   Pulse 65   Temp 36.7 °C (98 °F) (Temporal)   Ht 1.422 m (4' 8\")   Wt 69.9 kg (154 lb 3.2 oz)   SpO2 92%  Body mass index is 34.57 kg/m².    General: Well nourished, well developed female in NAD, awake and conversant.  Eyes: Normal conjunctiva, anicteric.  Round symmetrical pupils.  ENT: Hearing grossly intact.  No nasal discharge.  Neck: Neck is supple.  No masses or thyromegaly.  CV: No lower extremity edema.  Respiratory: Respirations are nonlabored.  No wheezing.  Abdomen: Non-Distended.  Skin: Warm.  No rashes or ulcers.  MSK: Normal ambulation.  No clubbing or cyanosis.  Neuro: Sensation and CN II-XII grossly normal.  Psych: Alert and oriented.  Cooperative, appropriate mood and affect, normal judgment.      Assessment/Plan:  1. Establishing care with new doctor, encounter for    2. Thoracic aortic ectasia (HCC)  New to " examiner, chronic for patient. No longer following with cardiology. Last echocardiogram completed in 2118. Denies shortness of breath, chest pain, leg swelling.    3. Panlobular emphysema (HCC)  New to examiner, chronic for patient. Does not follow with pulmonary. She uses Advair 250-50 mcg/act twice a day and an albuterol inhaler as needed. She will continue with her current inhalers.    4. Chronic diastolic congestive heart failure (HCC)  New to examiner, chronic for patient. She is no longer following with cardiology. She has a history of congestive heart failure and is currently on metoprolol 25 mg twice a day, furosemide 80 mg daily, and aspirin 81 mg daily. She will continue her current medication regimen.    5. CKD (chronic kidney disease) stage 3 b  New to [place], chronic for patient. She has a history of chronic kidney disease, previously stage IV but now stage III. She will continue to follow up with her nephrologist, Dr. Simon.    6. Dyslipidemia  New to [place], chronic for patient. She is on atorvastatin 80 mg daily. Fasting labs have been ordered to check her cholesterol levels prior to follow up in 6 months.  - Lipid Profile; Future    7. Essential hypertension  New to [place], chronic for patient. She is on amlodipine 10 mg daily and metoprolol 25 mg twice a day. She will continue her current medication regimen.    8. Gastroesophageal reflux disease without esophagitis  New to examiner, chronic for patient. The patient reported experiencing heartburn despite taking omeprazole. The cause is unclear, and dietary factors were considered. She will continue taking omeprazole 20 mg daily.    9. Prediabetes  New to examiner, ongoing for patient. Her last A1c was 6.1% in November, indicating prediabetes. An A1c in clinic today was 6.2%. POCT retinal screening completed today. Encourage healthy diet and exercise as tolerated.  - POCT  A1C  - POCT Retinal Eye Exam    10. Routine health maintenance  A  retinal screening will be performed today. An annual wellness visit is scheduled for 6 months from now.      Educated in proper administration of medication(s) ordered today including safety, possible SE, risks, benefits, rationale and alternatives to therapy.   Supportive care, differential diagnoses, and indications for immediate follow-up discussed with patient.    Pathogenesis of diagnosis discussed including typical length and natural progression.    Instructed to return to clinic or nearest emergency department for any change in condition, further concerns, or worsening of symptoms.  Patient states understanding of the plan of care and discharge instructions.    Return in about 6 months (around 3/12/2025) for AWV.    I have placed the below orders and discussed them with an approved delegating provider. The MA is performing the below orders under the direction of Dr. Solano.     My total time spent caring for the patient on the day of the encounter was 40 minutes.     Please note that this dictation was created using voice recognition software. I have made every reasonable attempt to correct obvious errors, but I expect that there are errors of grammar and possibly content that I did not discover before finalizing the note.

## 2024-09-29 ENCOUNTER — HOSPITAL ENCOUNTER (OUTPATIENT)
Dept: RADIOLOGY | Facility: MEDICAL CENTER | Age: 84
End: 2024-09-29
Payer: MEDICARE

## 2024-09-29 ENCOUNTER — OFFICE VISIT (OUTPATIENT)
Dept: URGENT CARE | Facility: PHYSICIAN GROUP | Age: 84
End: 2024-09-29
Payer: MEDICARE

## 2024-09-29 VITALS
OXYGEN SATURATION: 95 % | TEMPERATURE: 97.3 F | SYSTOLIC BLOOD PRESSURE: 118 MMHG | BODY MASS INDEX: 34.64 KG/M2 | RESPIRATION RATE: 16 BRPM | HEIGHT: 56 IN | HEART RATE: 65 BPM | WEIGHT: 154 LBS | DIASTOLIC BLOOD PRESSURE: 58 MMHG

## 2024-09-29 DIAGNOSIS — J44.1 COPD WITH ACUTE EXACERBATION (HCC): ICD-10-CM

## 2024-09-29 LAB
FLUAV RNA SPEC QL NAA+PROBE: NEGATIVE
FLUBV RNA SPEC QL NAA+PROBE: NEGATIVE
RSV RNA SPEC QL NAA+PROBE: NEGATIVE
SARS-COV-2 RNA RESP QL NAA+PROBE: NEGATIVE

## 2024-09-29 PROCEDURE — 71046 X-RAY EXAM CHEST 2 VIEWS: CPT

## 2024-09-29 RX ORDER — METHYLPREDNISOLONE 4 MG
TABLET, DOSE PACK ORAL
Qty: 21 TABLET | Refills: 0 | Status: SHIPPED | OUTPATIENT
Start: 2024-09-29

## 2024-09-29 RX ORDER — DOXYCYCLINE HYCLATE 100 MG
100 TABLET ORAL 2 TIMES DAILY
Qty: 14 TABLET | Refills: 0 | Status: SHIPPED | OUTPATIENT
Start: 2024-09-29 | End: 2024-10-06

## 2024-09-29 RX ORDER — BENZONATATE 100 MG/1
100 CAPSULE ORAL 3 TIMES DAILY PRN
Qty: 30 CAPSULE | Refills: 0 | Status: SHIPPED | OUTPATIENT
Start: 2024-09-29

## 2024-09-29 ASSESSMENT — ENCOUNTER SYMPTOMS
VOMITING: 0
BLOOD IN STOOL: 0
ABDOMINAL PAIN: 0
COUGH: 1
CONSTIPATION: 0
SPUTUM PRODUCTION: 1
FEVER: 0
DIARRHEA: 1
NAUSEA: 0
WHEEZING: 0
SHORTNESS OF BREATH: 1

## 2024-09-29 ASSESSMENT — FIBROSIS 4 INDEX: FIB4 SCORE: 1.49

## 2024-09-29 NOTE — PROGRESS NOTES
Verbal consent was acquired by the patient to use [employer] ambient listening note generation during this visit   Subjective:   Valorie Sierra is a 84 y.o. female who presents for Cough (Pt. States since beginning of summer Cough, coughing up yellow mucus x 1 day diarrhea)      HPI:  History of Present Illness  The patient is an 84-year-old female who presents for evaluation of a persistent cough.    She reports that the cough, originating in her throat and chest, has been ongoing for several weeks. The cough occasionally produces phlegm. She experiences shortness of breath during physical activity, a symptom she did not previously have. She has not taken any medication specifically for these symptoms, only her regular medications.She reports no fevers or body aches but does feel cold. She does have an underlying history Copd.           Review of Systems   Constitutional:  Negative for fever.   Respiratory:  Positive for cough, sputum production and shortness of breath. Negative for wheezing.    Gastrointestinal:  Positive for diarrhea. Negative for abdominal pain, blood in stool, constipation, nausea and vomiting.       Medications:    Current Outpatient Medications on File Prior to Visit   Medication Sig Dispense Refill    omeprazole (PRILOSEC) 20 MG delayed-release capsule Take 1 Capsule by mouth every day. 90 Capsule 1    metoprolol tartrate (LOPRESSOR) 25 MG Tab Take 1 Tablet by mouth 2 times a day. 180 Tablet 3    furosemide (LASIX) 80 MG Tab Take 1 Tablet by mouth every day. 90 Tablet 3    atorvastatin (LIPITOR) 80 MG tablet Take 1 Tablet by mouth every evening. 100 Tablet 3    amLODIPine (NORVASC) 10 MG Tab Take 1 Tablet by mouth every day. 90 Tablet 3    Cholecalciferol (VITAMIN D) 125 MCG (5000 UT) Cap Take 5,000 Units by mouth every Monday, Wednesday, and Friday. 30 Capsule 3    albuterol 108 (90 Base) MCG/ACT Aero Soln inhalation aerosol Inhale 2 Puffs every four hours as needed (Emergency Rescue  use for - Shortness of Breath, or Wheezing.). 1 Each 0    fluticasone-salmeterol (ADVAIR) 250-50 MCG/ACT AEROSOL POWDER, BREATH ACTIVATED TAKE 1 PUFF BY MOUTH EVERY 12 HOURS 180 Each 3    Probiotic Product (PROBIOTIC DAILY PO) Take  by mouth.      aspirin EC (ECOTRIN) 81 MG TBEC Take 1 Tab by mouth every day. 90 Tab 3     No current facility-administered medications on file prior to visit.        Allergies:   Iodine, Oxycodone-acetaminophen, Sulfa drugs, Food, and Nsaids    Problem List:   Patient Active Problem List   Diagnosis    Chronic diastolic congestive heart failure (HCC)    Dyslipidemia    COPD (chronic obstructive pulmonary disease) ([facility])    Essential hypertension    Hx of CABG    CAD - Coronary artery disease involving native coronary artery of native heart without angina pectoris    Osteopenia    Risk for falls    Pulmonary hypertension ([facility])    Stage 3b chronic kidney disease    Prediabetes    Muscle cramping    Gastroesophageal reflux disease without esophagitis    Secondary hyperparathyroidism of renal origin ([facility])    Thoracic aortic ectasia ([facility])        Surgical History:  Past Surgical History:   Procedure Laterality Date    MULTIPLE CORONARY ARTERY BYPASS ENDO VEIN HARVEST  2014    Performed by Stephen Nowak M.D. at SURGERY [facility] ORS    BLADDER NECK CONTRACTURE EXICISION  1970    GANGLION EXCISION  1969    left wrist    ABDOMINAL EXPLORATION      ABDOMINAL HYSTERECTOMY TOTAL      fibroids 40    APPENDECTOMY      DENTAL EXTRACTION(S)         Past Social Hx:   Social History     Tobacco Use    Smoking status: Former     Current packs/day: 0.00     Average packs/day: 3.0 packs/day for 52.0 years (156.0 ttl pk-yrs)     Types: Cigarettes     Start date: 1955     Quit date: 2007     Years since quittin.0     Passive exposure: Past    Smokeless tobacco: Never    Tobacco comments:     Quit    Vaping Use    Vaping status: Never Used   Substance Use Topics    Alcohol use: No  "    Alcohol/week: 0.0 oz    Drug use: No          Problem list, medications, and allergies reviewed by myself today in Epic.     Objective:     /58   Pulse 65   Temp 36.3 °C (97.3 °F) (Temporal)   Resp 16   Ht 1.422 m (4' 8\")   Wt 69.9 kg (154 lb)   SpO2 95%   BMI 34.53 kg/m²     Physical Exam  Vitals and nursing note reviewed.   Constitutional:       General: She is not in acute distress.     Appearance: Normal appearance. She is normal weight. She is not ill-appearing, toxic-appearing or diaphoretic.   HENT:      Head: Normocephalic and atraumatic.      Right Ear: Tympanic membrane, ear canal and external ear normal. There is no impacted cerumen.      Left Ear: Tympanic membrane, ear canal and external ear normal. There is no impacted cerumen.      Nose: Nose normal. No congestion or rhinorrhea.      Mouth/Throat:      Mouth: Mucous membranes are moist.      Pharynx: Oropharynx is clear. No oropharyngeal exudate or posterior oropharyngeal erythema.   Cardiovascular:      Rate and Rhythm: Normal rate and regular rhythm.      Pulses: Normal pulses.      Heart sounds: Normal heart sounds. No murmur heard.     No friction rub. No gallop.   Pulmonary:      Effort: Pulmonary effort is normal. No respiratory distress.      Breath sounds: Normal breath sounds. No stridor. No wheezing, rhonchi or rales.   Chest:      Chest wall: No tenderness.   Musculoskeletal:      Cervical back: Neck supple. No tenderness.   Lymphadenopathy:      Cervical: No cervical adenopathy.   Skin:     General: Skin is warm and dry.      Capillary Refill: Capillary refill takes less than 2 seconds.   Neurological:      General: No focal deficit present.      Mental Status: She is alert and oriented to person, place, and time. Mental status is at baseline.      Cranial Nerves: No cranial nerve deficit.      Motor: No weakness.      Gait: Gait normal.   Psychiatric:         Mood and Affect: Mood normal.         Behavior: Behavior " normal.         Thought Content: Thought content normal.         Judgment: Judgment normal.         Assessment/Plan:     Diagnosis and associated orders:   1. COPD with acute exacerbation (HCC)  - POCT CoV-2, Flu A/B, RSV by PCR  - DX-CHEST-2 VIEWS; Future  - doxycycline (VIBRAMYCIN) 100 MG Tab; Take 1 Tablet by mouth 2 times a day for 7 days.  Dispense: 14 Tablet; Refill: 0  - methylPREDNISolone (MEDROL DOSEPAK) 4 MG Tablet Therapy Pack; Follow schedule on package instructions.  Dispense: 21 Tablet; Refill: 0  - benzonatate (TESSALON) 100 MG Cap; Take 1 Capsule by mouth 3 times a day as needed for Cough.  Dispense: 30 Capsule; Refill: 0      RADIOLOGY RESULTS   DX-CHEST-2 VIEWS    Result Date: 9/29/2024 9/29/2024 4:14 PM HISTORY/REASON FOR EXAM:  Shortness of Breath. TECHNIQUE/EXAM DESCRIPTION AND NUMBER OF VIEWS: Two views of the chest. COMPARISON:  01/06/2023 FINDINGS: The heart is normal in size. No pulmonary infiltrates or consolidations are noted. No pleural effusions are appreciated.     No active disease.            Comments/MDM:   Pt is clinically stable at today's acute urgent care visit.  No acute distress noted. Appropriate for outpatient management at this time.     Assessment & Plan  The patient presents today for persistent cough and shortness of breath.  Her vital signs are stable in clinic today.  Her lung sounds are clear on auscultation.  No labored breathing on exam today.  X-ray was obtained today and shows no active disease.  The patient does have underlying history of COPD.  Viral swab for COVID, influenza, RSV was obtained today, results pending, will notify patient of any positive result.  The patient will be treated for acute COPD exacerbation with course of doxycycline, Medrol Dosepak and Tessalon Perles. Patient is to return to  or go to ER for any new or worsening signs or symptoms, and follow with with PCP for recheck. Patient is agreeable with plan of care and verbalizes good  understanding.                  Discussed DDx, management options (risks,benefits, and alternatives to planned treatment), natural progression and supportive care.  Expressed understanding and the treatment plan was agreed upon. Questions were encouraged and answered   Return to urgent care prn if new or worsening sx or if there is no improvement in condition prn.    Educated in Red flags and indications to immediately call 911 or present to the Emergency Department.   Advised the patient to follow-up with the primary care physician for recheck, reevaluation, and consideration of further management.    I personally reviewed prior external notes and test results pertinent to today's visit.  I have independently reviewed and interpreted all diagnostics ordered during this urgent care acute visit.       Please note that this dictation was created using voice recognition software. I have made a reasonable attempt to correct obvious errors, but I expect that there are errors of grammar and possibly content that I did not discover before finalizing the note.    This note was electronically signed by CASIE Erickson

## 2024-10-15 LAB — RETINAL SCREEN: NEGATIVE

## 2024-12-16 DIAGNOSIS — E78.5 DYSLIPIDEMIA: ICD-10-CM

## 2024-12-16 RX ORDER — ATORVASTATIN CALCIUM 80 MG/1
80 TABLET, FILM COATED ORAL EVERY EVENING
Qty: 100 TABLET | Refills: 0 | Status: SHIPPED | OUTPATIENT
Start: 2024-12-16

## 2024-12-19 DIAGNOSIS — J43.1 PANLOBULAR EMPHYSEMA (HCC): ICD-10-CM

## 2024-12-19 RX ORDER — FLUTICASONE PROPIONATE AND SALMETEROL 250; 50 UG/1; UG/1
1 POWDER RESPIRATORY (INHALATION) EVERY 12 HOURS
COMMUNITY
End: 2024-12-19 | Stop reason: SDUPTHER

## 2024-12-19 NOTE — TELEPHONE ENCOUNTER
Received request via: Patient    Was the patient seen in the last year in this department? Yes    Does the patient have an active prescription (recently filled or refills available) for medication(s) requested? No    [facility]    Does the patient have [facility] and need 100-day supply? (This applies to ALL medications) Yes, quantity updated to 100 days

## 2024-12-20 RX ORDER — FLUTICASONE PROPIONATE AND SALMETEROL 250; 50 UG/1; UG/1
1 POWDER RESPIRATORY (INHALATION) EVERY 12 HOURS
Qty: 180 EACH | Refills: 3 | Status: SHIPPED | OUTPATIENT
Start: 2024-12-20

## 2024-12-20 RX ORDER — ALBUTEROL SULFATE 90 UG/1
2 INHALANT RESPIRATORY (INHALATION) EVERY 4 HOURS PRN
Qty: 3 EACH | Refills: 3 | Status: SHIPPED | OUTPATIENT
Start: 2024-12-20

## 2024-12-20 NOTE — TELEPHONE ENCOUNTER
Requested Prescriptions     Pending Prescriptions Disp Refills    fluticasone-salmeterol (WIXELA INHUB) 250-50 MCG/ACT AEROSOL POWDER, BREATH ACTIVATED 180 Each 3     Sig: Inhale 1 Puff every 12 hours.    albuterol 108 (90 Base) MCG/ACT Aero Soln inhalation aerosol 3 Each 3     Sig: Inhale 2 Puffs every four hours as needed (Emergency Rescue use for - Shortness of Breath, or Wheezing.).       LUCIE Dickson.    [facility]

## 2025-04-30 ENCOUNTER — HOSPITAL ENCOUNTER (OUTPATIENT)
Dept: LAB | Facility: MEDICAL CENTER | Age: 85
End: 2025-04-30
Attending: NURSE PRACTITIONER
Payer: MEDICARE

## 2025-04-30 DIAGNOSIS — E78.5 DYSLIPIDEMIA: ICD-10-CM

## 2025-04-30 LAB
CHOLEST SERPL-MCNC: 158 MG/DL (ref 100–199)
FASTING STATUS PATIENT QL REPORTED: NORMAL
HDLC SERPL-MCNC: 73 MG/DL
LDLC SERPL CALC-MCNC: 72 MG/DL
TRIGL SERPL-MCNC: 66 MG/DL (ref 0–149)

## 2025-04-30 PROCEDURE — 80061 LIPID PANEL: CPT

## 2025-04-30 PROCEDURE — 36415 COLL VENOUS BLD VENIPUNCTURE: CPT

## 2025-05-06 ENCOUNTER — RESULTS FOLLOW-UP (OUTPATIENT)
Dept: MEDICAL GROUP | Facility: PHYSICIAN GROUP | Age: 85
End: 2025-05-06

## 2025-05-06 ENCOUNTER — APPOINTMENT (OUTPATIENT)
Dept: MEDICAL GROUP | Facility: PHYSICIAN GROUP | Age: 85
End: 2025-05-06
Payer: MEDICARE

## 2025-05-06 VITALS
OXYGEN SATURATION: 93 % | HEIGHT: 58 IN | DIASTOLIC BLOOD PRESSURE: 80 MMHG | HEART RATE: 69 BPM | TEMPERATURE: 97.6 F | BODY MASS INDEX: 29.91 KG/M2 | WEIGHT: 142.5 LBS | SYSTOLIC BLOOD PRESSURE: 128 MMHG

## 2025-05-06 DIAGNOSIS — I50.32 CHRONIC DIASTOLIC CONGESTIVE HEART FAILURE (HCC): ICD-10-CM

## 2025-05-06 DIAGNOSIS — I10 ESSENTIAL HYPERTENSION: Chronic | ICD-10-CM

## 2025-05-06 DIAGNOSIS — N18.32 STAGE 3B CHRONIC KIDNEY DISEASE: ICD-10-CM

## 2025-05-06 DIAGNOSIS — E78.5 DYSLIPIDEMIA: ICD-10-CM

## 2025-05-06 DIAGNOSIS — K21.9 GASTROESOPHAGEAL REFLUX DISEASE WITHOUT ESOPHAGITIS: ICD-10-CM

## 2025-05-06 DIAGNOSIS — J43.1 PANLOBULAR EMPHYSEMA (HCC): ICD-10-CM

## 2025-05-06 DIAGNOSIS — R73.03 PREDIABETES: ICD-10-CM

## 2025-05-06 DIAGNOSIS — Z91.81 RISK FOR FALLS: ICD-10-CM

## 2025-05-06 DIAGNOSIS — Z00.00 MEDICARE ANNUAL WELLNESS VISIT, SUBSEQUENT: ICD-10-CM

## 2025-05-06 DIAGNOSIS — I27.20 PULMONARY HYPERTENSION (HCC): ICD-10-CM

## 2025-05-06 LAB
HBA1C MFR BLD: 5.8 % (ref ?–5.8)
POCT INT CON NEG: NEGATIVE
POCT INT CON POS: POSITIVE

## 2025-05-06 PROCEDURE — 3074F SYST BP LT 130 MM HG: CPT | Performed by: NURSE PRACTITIONER

## 2025-05-06 PROCEDURE — 83036 HEMOGLOBIN GLYCOSYLATED A1C: CPT | Performed by: NURSE PRACTITIONER

## 2025-05-06 PROCEDURE — 3079F DIAST BP 80-89 MM HG: CPT | Performed by: NURSE PRACTITIONER

## 2025-05-06 PROCEDURE — G0439 PPPS, SUBSEQ VISIT: HCPCS | Performed by: NURSE PRACTITIONER

## 2025-05-06 RX ORDER — AMLODIPINE BESYLATE 10 MG/1
10 TABLET ORAL DAILY
Qty: 100 TABLET | Refills: 3 | Status: SHIPPED | OUTPATIENT
Start: 2025-05-06

## 2025-05-06 RX ORDER — ALBUTEROL SULFATE 90 UG/1
2 INHALANT RESPIRATORY (INHALATION) EVERY 4 HOURS PRN
Qty: 3 EACH | Refills: 3 | Status: SHIPPED | OUTPATIENT
Start: 2025-05-06

## 2025-05-06 RX ORDER — ATORVASTATIN CALCIUM 80 MG/1
80 TABLET, FILM COATED ORAL EVERY EVENING
Qty: 100 TABLET | Refills: 3 | Status: SHIPPED | OUTPATIENT
Start: 2025-05-06

## 2025-05-06 RX ORDER — OMEPRAZOLE 20 MG/1
20 CAPSULE, DELAYED RELEASE ORAL
Qty: 100 CAPSULE | Refills: 3 | Status: SHIPPED | OUTPATIENT
Start: 2025-05-06

## 2025-05-06 RX ORDER — METOPROLOL TARTRATE 25 MG/1
25 TABLET, FILM COATED ORAL 2 TIMES DAILY
Qty: 200 TABLET | Refills: 3 | Status: SHIPPED | OUTPATIENT
Start: 2025-05-06

## 2025-05-06 ASSESSMENT — ACTIVITIES OF DAILY LIVING (ADL): BATHING_REQUIRES_ASSISTANCE: 0

## 2025-05-06 ASSESSMENT — ENCOUNTER SYMPTOMS: GENERAL WELL-BEING: FAIR

## 2025-05-06 ASSESSMENT — PATIENT HEALTH QUESTIONNAIRE - PHQ9: CLINICAL INTERPRETATION OF PHQ2 SCORE: 0

## 2025-05-06 NOTE — PROGRESS NOTES
Chief Complaint   Patient presents with    Annual Wellness Visit    Lab Results   Verbal consent was acquired by the patient to use [employer] ambient listening note generation during this visit Yes      HPI:  Valorie Sierra is a 84 y.o. here for Medicare Annual Wellness Visit   History of Present Illness  The patient presents for an annual wellness visit.    She has been making dietary modifications, including the elimination of Belén bars and a reduction in her overall sugar intake. Her breakfast typically consists of a muffin and a banana, which she consumes prior to taking her daily medications. She maintains a good appetite and attempts to consume a balanced diet, avoiding junk food as much as possible. She enjoys salads, homemade soup, chicken fried steak, and fish. She has discontinued vitamin D supplementation due to gastrointestinal upset but is considering resuming it.    She has received two doses of the shingles vaccine and is inquiring about the necessity of an additional dose. She has a history of smoking but has successfully quit. Due to fatigue, she does not engage in regular exercise and is contemplating the use of a walker with a seat for mobility.    Approximately 1.5 to 2 months ago, she experienced a fall resulting in head trauma and extensive bruising on her legs. She sustained deep lacerations on her knees, which she managed at home. Persistent soreness in her knees and occasional swelling in her legs are reported. She has noticed that her left leg turns red when she is active but returns to its normal color upon rest. She has a history of ankle instability, which has led to several falls over the past year.    She reports a persistent cough and uses Wixela twice daily. She previously used Symbicort but was unable to continue due to insurance restrictions. She uses albuterol as an emergency inhaler and is requesting a refill.    She is currently on amlodipine 10 mg for blood pressure  management and is seeking a refill. She takes low-dose aspirin daily and has observed worsening skin changes on her arms, which she attributes to the medication. She is on furosemide 80 mg twice daily, which was increased by Dr. Simon, and believes this medication is beneficial for her kidneys. She reports leg swelling if she misses a dose. She continues to take omeprazole 20 mg daily and a probiotic. She takes metoprolol twice daily for cardiac health but has not seen her cardiologist recently.    PAST SURGICAL HISTORY:  - Complete hysterectomy    SOCIAL HISTORY  She has a history of smoking but has successfully quit.     Patient Active Problem List    Diagnosis Date Noted    Thoracic aortic ectasia ([facility]) 09/15/2024    Secondary hyperparathyroidism of renal origin ([facility]) 06/09/2023    Gastroesophageal reflux disease without esophagitis 01/09/2023    Muscle cramping 10/28/2021    Prediabetes 06/16/2021    Stage 3b chronic kidney disease 06/07/2019    Pulmonary hypertension ([facility]) 07/31/2018    Risk for falls 04/18/2017    Osteopenia 11/01/2016    CAD - Coronary artery disease involving native coronary artery of native heart without angina pectoris 04/28/2016    Dyslipidemia 09/23/2014    COPD (chronic obstructive pulmonary disease) ([facility]) 09/23/2014    Essential hypertension 09/23/2014    Hx of CABG 09/23/2014    Chronic diastolic congestive heart failure ([facility]) 09/10/2014     Current Outpatient Medications   Medication Sig Dispense Refill    amLODIPine (NORVASC) 10 MG Tab Take 1 Tablet by mouth every day. 100 Tablet 3    albuterol 108 (90 Base) MCG/ACT Aero Soln inhalation aerosol Inhale 2 Puffs every four hours as needed (Emergency Rescue use for - Shortness of Breath, or Wheezing.). 3 Each 3    atorvastatin (LIPITOR) 80 MG tablet Take 1 Tablet by mouth every evening. 100 Tablet 3    omeprazole (PRILOSEC) 20 MG delayed-release capsule Take 1 Capsule by mouth every day. 100 Capsule 3    metoprolol tartrate (LOPRESSOR)  25 MG Tab Take 1 Tablet by mouth 2 times a day. 200 Tablet 3    fluticasone-salmeterol (WIXELA INHUB) 250-50 MCG/ACT AEROSOL POWDER, BREATH ACTIVATED Inhale 1 Puff every 12 hours. 180 Each 3    benzonatate (TESSALON) 100 MG Cap Take 1 Capsule by mouth 3 times a day as needed for Cough. 30 Capsule 0    furosemide (LASIX) 80 MG Tab Take 1 Tablet by mouth every day. 90 Tablet 3    Probiotic Product (PROBIOTIC DAILY PO) Take  by mouth.      aspirin EC (ECOTRIN) 81 MG TBEC Take 1 Tab by mouth every day. 90 Tab 3     No current facility-administered medications for this visit.          Current supplements as per medication list.     Allergies: Iodine, Oxycodone-acetaminophen, Sulfa drugs, Food, and Nsaids    Current social contact/activities: she likes to watch the news.     She  reports that she quit smoking about 17 years ago. Her smoking use included cigarettes. She started smoking about 69 years ago. She has a 156 pack-year smoking history. She has been exposed to tobacco smoke. She has never used smokeless tobacco. She reports that she does not drink alcohol and does not use drugs.  Counseling given: Yes  Tobacco comments: Quit 2007    ROS:    Gait: Uses a cane  Ostomy: No  Other tubes: No  Amputations: Yes  Chronic oxygen use: No  Last eye exam: unknown   Wears hearing aids: Yes   : Reports urinary leakage during the last 6 months that has not interfered at all with their daily activities or sleep.    Screening:    Depression Screening  Little interest or pleasure in doing things?  0 - not at all  Feeling down, depressed , or hopeless? 0 - not at all  Patient Health Questionnaire Score: 0     If depressive symptoms identified deferred to follow up visit unless specifically addressed in assessment and plan.    Interpretation of PHQ-9 Total Score   Score Severity   1-4 No Depression   5-9 Mild Depression   10-14 Moderate Depression   15-19 Moderately Severe Depression   20-27 Severe Depression    Screening for  Cognitive Impairment  Do you or any of your friends or family members have any concern about your memory? No  Three Minute Recall (Village, Kitchen, Baby) 2/3    Abhijit clock face with all 12 numbers and set the hands to show 10 minutes past 11.  Yes    Cognitive concerns identified deferred for follow up unless specifically addressed in assessment and plan.    Fall Risk Assessment  Has the patient had two or more falls in the last year or any fall with injury in the last year?  Yes    Safety Assessment  Do you always wear your seatbelt?  Yes  Any changes to home needed to function safely? Yes  Difficulty hearing.  Yes  Patient counseled about all safety risks that were identified.    Functional Assessment ADLs  Are there any barriers preventing you from cooking for yourself or meeting nutritional needs?  No.    Are there any barriers preventing you from driving safely or obtaining transportation?  Yes.    Are there any barriers preventing you from using a telephone or calling for help?  No    Are there any barriers preventing you from shopping?  No.    Are there any barriers preventing you from taking care of your own finances?  No    Are there any barriers preventing you from managing your medications?  No    Are there any barriers preventing you from showering, bathing or dressing yourself? No    Are there any barriers preventing you from doing housework or laundry? No  Are there any barriers preventing you from using the toilet?No  Are you currently engaging in any exercise or physical activity?  No.      Self-Assessment of Health  What is your perception of your health? Fair    Do you sleep more than six hours a night? Yes    In the past 7 days, how much did pain keep you from doing your normal work? Some    Do you spend quality time with family or friends (virtually or in person)? Yes    Do you usually eat a heart healthy diet that constists of a variety of fruits, vegetables, whole grains and fiber? Yes    Do  you eat foods high in fat and/or Fast Food more than three times per week? No    How concerned are you that your medical conditions are not being well managed? Not at all    Are you worried that in the next 2 months, you may not have stable housing that you own, rent, or stay in as part of a household? No      Advance Care Planning  Do you have an Advance Directive, Living Will, Durable Power of , or POLST? Yes  Advance Directive   Durable Power of  POLST      Health Maintenance Summary            Current Care Gaps       Zoster (Shingles) Vaccines (3 of 3) Overdue since 11/7/2021 09/12/2021  Imm Admin: Zoster Vaccine Recombinant (RZV) (SHINGRIX)    08/23/2012  Imm Admin: Zoster Vaccine Live (ZVL) (Zostavax) - HISTORICAL DATA              COVID-19 Vaccine (6 - 2024-25 season) Overdue since 3/3/2025      09/03/2024  Imm Admin: Comirnaty (Covid-19 Vaccine, Mrna, 7401-4253 Formula)    10/17/2023  Imm Admin: Covid-19 Mrna (Spikevax) Moderna 12+ Years    10/06/2022  Imm Admin: MODERNA BIVALENT BOOSTER SARS-COV-2 VACCINE (6+)    04/09/2022  Imm Admin: MODERNA SARS-COV-2 VACCINE (12+)    11/19/2021  Imm Admin: Asaf SARS-CoV-2 Vaccine     Only the first 5 history entries have been loaded, but more history exists.                    Upcoming       Annual Wellness Visit (Yearly) Next due on 5/6/2026 05/06/2025  Subsequent Annual Wellness Visit - Includes PPPS ()    05/06/2025  Visit Dx: Medicare annual wellness visit, subsequent    03/19/2024  Level of Service: ANNUAL WELLNESS VISIT-INCLUDES PPPS SUBSEQUE*    12/15/2020  Visit Dx: Medicare annual wellness visit, subsequent              IMM DTaP/Tdap/Td Vaccine (3 - Td or Tdap) Next due on 3/19/2034      03/19/2024  Imm Admin: Tdap Vaccine    08/23/2012  Imm Admin: Tdap Vaccine                      Completed or No Longer Recommended       Influenza Vaccine (Series Information) Completed      09/03/2024  Imm Admin: Influenza High Dose Trivalent  Adult    08/23/2023  Imm Admin: Influenza Vaccine Adult HD    08/29/2022  Imm Admin: Influenza Vaccine Adult HD    08/13/2021  Imm Admin: Influenza Vaccine Adult HD    10/15/2020  Imm Admin: Influenza Vaccine Quad Inj (Pf)      Only the first 5 history entries have been loaded, but more history exists.              Pneumococcal Vaccine: 50+ Years (Series Information) Completed      09/09/2015  Imm Admin: Pneumococcal Conjugate Vaccine (Prevnar/PCV-13)    08/31/2005  Imm Admin: Pneumococcal polysaccharide vaccine (PPSV-23)              Hepatitis A Vaccine (Hep A) (Series Information) Aged Out      No completion history exists for this topic.              HPV Vaccines (Series Information) Aged Out     No completion history exists for this topic.              Polio Vaccine (Inactivated Polio) (Series Information) Aged Out     No completion history exists for this topic.              Meningococcal Immunization (Series Information) Aged Out     No completion history exists for this topic.              Diabetes: Retinopathy Screening  Discontinued        Frequency changed to Never automatically (Topic No Longer Applies)    09/12/2024  POCT Retinal Eye Exam              A1c Screening  Discontinued        Frequency changed to Never automatically (Topic No Longer Applies)    05/06/2025  POCT  A1C    09/12/2024  POCT  A1C    11/14/2023  POCT  A1C    06/08/2023  POCT  A1C     Only the first 5 history entries have been loaded, but more history exists.            Fasting Lipid Profile  Discontinued        Frequency changed to Never automatically (Topic No Longer Applies)    04/30/2025  Lipid Profile    10/30/2023  Lipid Profile    05/28/2021  Lipid Profile    05/21/2019  Lipid Profile      Only the first 5 history entries have been loaded, but more history exists.              Diabetes: Urine Protein Screening  Discontinued        Frequency changed to Never automatically (Topic No Longer Applies)    07/11/2024  Order placed for  PROTEIN/CREAT RATIO URINE by Vitaliy Simon M.D.    2024  MICROALBUMIN CREAT RATIO URINE    2024  PROTEIN/CREAT RATIO URINE    2024  PROTEIN/CREAT RATIO URINE      Only the first 5 history entries have been loaded, but more history exists.              SERUM CREATININE  Discontinued        Frequency changed to Never automatically (Topic No Longer Applies)    2024  Order placed for Basic Metabolic Panel by Vitaliy Simon M.D.    2024  Basic Metabolic Panel    2024  Basic Metabolic Panel    2024  Basic Metabolic Panel      Only the first 5 history entries have been loaded, but more history exists.              Bone Density Scan  Discontinued      2016  Done              Hepatitis B Vaccine (Hep B)  Discontinued      No completion history exists for this topic.              Diabetes: Monofilament / LE Exam  Discontinued        Frequency changed to Never automatically (Topic No Longer Applies)    2024  [employer]: WORKFLOW - DIABETES - DIABETIC FOOT EXAM PERFORMED    2024  Diabetic Monofilament LE Exam    12/15/2022  Diabetic Monofilament LE Exam    2022  [employer]: WORKFLOW - DIABETES - DIABETIC FOOT EXAM PERFORMED     Only the first 5 history entries have been loaded, but more history exists.            Annual Pulmonary Function Test / Spirometry  Discontinued      2017  PFT DICTATED RESULTS    10/18/2016  Done                          Patient Care Team:  HAFSA Dickson as PCP - General (Family Medicine)  Vitaliy Simon M.D. (Nephrology)    Social History     Tobacco Use    Smoking status: Former     Current packs/day: 0.00     Average packs/day: 3.0 packs/day for 52.0 years (156.0 ttl pk-yrs)     Types: Cigarettes     Start date: 1955     Quit date: 2007     Years since quittin.7     Passive exposure: Past    Smokeless tobacco: Never    Tobacco comments:     Quit    Vaping Use    Vaping status: Never Used   Substance Use  Topics    Alcohol use: No     Alcohol/week: 0.0 oz    Drug use: No     Family History   Problem Relation Age of Onset    Stroke Mother     Hyperlipidemia Mother     Hypertension Mother     Lung Disease Mother         smoker    Other Father         thoracic aortic aneurysm    Alcohol abuse Father     Heart Disease Father     Hypertension Father     Hyperlipidemia Father     Lung Disease Father         smoker    Abdominal aortic aneurysm Father     Parkinson's Disease Maternal Aunt     Heart Disease Paternal Aunt     No Known Problems Paternal Aunt     No Known Problems Paternal Aunt     No Known Problems Paternal Aunt     No Known Problems Paternal Aunt     No Known Problems Paternal Uncle     No Known Problems Paternal Uncle     Heart Disease Maternal Grandmother     No Known Problems Maternal Grandfather     DVT Paternal Grandmother     Suicide Attempts Paternal Grandfather     Alcohol abuse Daughter     Alcohol abuse Son     GI Disease Son         Liver cirrhosis     She  has a past medical history of Anemia, Anxiety, Arrhythmia, Arthritis, Blood transfusion, without reported diagnosis, CHF (congestive heart failure) (HCC), Chronic airway obstruction, not elsewhere classified, Depression, Emphysema, GERD (gastroesophageal reflux disease), Headache, classical migraine, Heart attack (HCC), Heart murmur, Hyperlipidemia, Hypertension, IBD (inflammatory bowel disease), Kidney disease, Ulcer, and Unspecified cataract.   Past Surgical History:   Procedure Laterality Date    MULTIPLE CORONARY ARTERY BYPASS ENDO VEIN HARVEST  09/01/2014    Performed by Stephen Nowak M.D. at SURGERY [facility]    BLADDER NECK CONTRACTURE EXICISION  1970    GANGLION EXCISION  1969    left wrist    ABDOMINAL EXPLORATION      ABDOMINAL HYSTERECTOMY TOTAL      fibroids 40    APPENDECTOMY      DENTAL EXTRACTION(S)       Exam:   /80 (BP Location: Left arm, Patient Position: Sitting, BP Cuff Size: Adult)   Pulse 69   Temp 36.4 °C  "(97.6 °F) (Temporal)   Ht 1.473 m (4' 10\")   Wt 64.6 kg (142 lb 8 oz)   SpO2 93%  Body mass index is 29.78 kg/m².    Hearing fair .    Dentition upper and lower dentures  Alert, oriented in no acute distress.  Eye contact is good, speech goal directed, affect calm    Assessment and Plan. The following treatment and monitoring plan is recommended:    1. Medicare annual wellness visit, subsequent  - Cholesterol levels have shown significant improvement since the last evaluation in 2023. Current total cholesterol is 158, triglycerides are 66, HDL is 73, and LDL is 72.  - A1c level has decreased from 6.2 to 5.8. Oxygen saturation is at 93%, which is within the acceptable range.  - Advised to receive one more dose of the Shingrix vaccine, which can be administered at [facility]. A coupon for the shingles vaccine has been provided to potentially eliminate any associated costs.  - Refills for all her medications have been sent to pharmacy.  - Subsequent Annual Wellness Visit - Includes PPPS ()    2. Panlobular emphysema (HCC)  Chronic, ongoing, does not follow with pulmonary.  - Continue Wixela 250-50 mcg/act twice a day and albuterol as an emergency inhaler.  - Medication effectiveness and response to treatment are being monitored.  - Refills for albuterol have been sent to pharmacy.  - albuterol 108 (90 Base) MCG/ACT Aero Soln inhalation aerosol; Inhale 2 Puffs every four hours as needed (Emergency Rescue use for - Shortness of Breath, or Wheezing.).  Dispense: 3 Each; Refill: 3    3. Pulmonary hypertension (HCC)  Chronic, ongoing.   - Has not seen her cardiologist for quite a while due to canceled appointments.  - Discussed the importance of regular cardiology follow-ups.  - Encouraged to contact her cardiologist to reschedule the appointments.    4. Chronic diastolic congestive heart failure (HCC)  Chronic, ongoing. Continue metoprolol 25 mg twice daily and furosemide 80 mg twice daily. Encouraged patient to schedule " follow up with cardiology.  - metoprolol tartrate (LOPRESSOR) 25 MG Tab; Take 1 Tablet by mouth 2 times a day.  Dispense: 200 Tablet; Refill: 3    5. Stage 3b chronic kidney disease  Chronic, ongoing. Continue to follow with nephrology.    6. Essential hypertension  Chronic, ongoing.   - Continue amlodipine 10 mg daily and metoprolol 25 mg twice a day.  - Blood pressure medication effectiveness is being monitored.  - Refills for these medications have been sent to pharmacy.  - amLODIPine (NORVASC) 10 MG Tab; Take 1 Tablet by mouth every day.  Dispense: 100 Tablet; Refill: 3  - metoprolol tartrate (LOPRESSOR) 25 MG Tab; Take 1 Tablet by mouth 2 times a day.  Dispense: 200 Tablet; Refill: 3    7. Dyslipidemia  Chronic, improving. Continue atorvastatin 80 mg daily, refill sent to pharmacy.   - atorvastatin (LIPITOR) 80 MG tablet; Take 1 Tablet by mouth every evening.  Dispense: 100 Tablet; Refill: 3    8. Prediabetes  Chronic, improving without medication. A1c in clinic 5.8%. Continue healthy diet changes and increased activity.  - POCT  A1C    9. Gastroesophageal reflux disease without esophagitis  Chronic, ongoing.   - Takes omeprazole 20 mg daily and a probiotic.  - Medication effectiveness is being monitored.  - Refills for these medications have been sent to pharmacy.  - omeprazole (PRILOSEC) 20 MG delayed-release capsule; Take 1 Capsule by mouth every day.  Dispense: 100 Capsule; Refill: 3    10. Risk for falls  - Experienced a fall about 1.5 to 2 months ago, resulting in bruising and soreness in both knees. There is a little bit of swelling in both legs.  - Redness in her leg appears to be related to circulation issues rather than an infection, as it improves with rest and elevation. If the redness persists despite elevation, she should inform the office immediately.  - Discussed the circulation issue and the improvement with rest and elevation.  - A walker with a seat will be ordered for her.  - Patient  identified as fall risk.  Appropriate orders and counseling given.  - DME Walker      Services suggested: No services needed at this time  Health Care Screening: Age-appropriate preventive services recommended by USPTF and ACIP covered by Medicare were discussed today. Services ordered if indicated and agreed upon by the patient.  Referrals offered: Community-based lifestyle interventions to reduce health risks and promote self-management and wellness, fall prevention, nutrition, physical activity, tobacco-use cessation, weight loss, and mental health services as per orders if indicated.    Discussion today about general wellness and lifestyle habits:    Prevent falls and reduce trip hazards; Cautioned about securing or removing rugs.  Have a working fire alarm and carbon monoxide detector;   Engage in regular physical activity and social activities     Follow-up: Return in about 6 months (around 11/6/2025) for Preventative Annual, A1C in Clinic.    I have placed the below orders and discussed them with an approved delegating provider. The MA is performing the below orders under the direction of Dr. Solano.      Please note that this dictation was created using voice recognition software. I have worked with consultants from the vendor as well as technical experts from [employer] to optimize the interface. I have made every reasonable attempt to correct obvious errors, but I expect that there are errors of grammar and possibly content that I did not discover before finalizing the note.

## 2025-06-09 ENCOUNTER — TELEPHONE (OUTPATIENT)
Dept: NEPHROLOGY | Facility: MEDICAL CENTER | Age: 85
End: 2025-06-09
Payer: MEDICARE

## 2025-06-09 DIAGNOSIS — I50.32 CHRONIC DIASTOLIC CONGESTIVE HEART FAILURE (HCC): ICD-10-CM

## 2025-06-09 DIAGNOSIS — I10 ESSENTIAL HYPERTENSION: Chronic | ICD-10-CM

## 2025-06-09 RX ORDER — FUROSEMIDE 80 MG/1
80 TABLET ORAL DAILY
Qty: 90 TABLET | Refills: 3 | Status: SHIPPED | OUTPATIENT
Start: 2025-06-09

## 2025-06-09 NOTE — TELEPHONE ENCOUNTER
Pt called and stated that she needs a new prescription for furosemide. I explained that we did not get a refill request because another prescriber ordered it last. She stated she doesn't know who to contact to get it filled because she thought it was Dr. Simon and the other provider is no longer at that office. I stated I will send a message to Dr. Simon to possibly refill but he is not in the office today. She stated she will also contact her PCP's office as she only has 1 pill left. Pharmacy is verified.

## 2025-06-18 ENCOUNTER — HOSPITAL ENCOUNTER (OUTPATIENT)
Dept: LAB | Facility: MEDICAL CENTER | Age: 85
End: 2025-06-18
Attending: INTERNAL MEDICINE
Payer: MEDICARE

## 2025-06-18 DIAGNOSIS — N18.4 CKD (CHRONIC KIDNEY DISEASE) STAGE 4, GFR 15-29 ML/MIN (HCC): ICD-10-CM

## 2025-06-18 DIAGNOSIS — E21.3 HYPERPARATHYROIDISM (HCC): ICD-10-CM

## 2025-06-18 DIAGNOSIS — Z87.440 HISTORY OF UTI: ICD-10-CM

## 2025-06-18 DIAGNOSIS — D64.9 ANEMIA, UNSPECIFIED TYPE: ICD-10-CM

## 2025-06-18 DIAGNOSIS — I10 ESSENTIAL HYPERTENSION: ICD-10-CM

## 2025-06-18 LAB
25(OH)D3 SERPL-MCNC: 37 NG/ML (ref 30–100)
ALBUMIN SERPL BCP-MCNC: 4.6 G/DL (ref 3.2–4.9)
ANION GAP SERPL CALC-SCNC: 13 MMOL/L (ref 7–16)
APPEARANCE UR: ABNORMAL
BACTERIA #/AREA URNS HPF: ABNORMAL /HPF
BILIRUB UR QL STRIP.AUTO: NEGATIVE
BUN SERPL-MCNC: 51 MG/DL (ref 8–22)
CALCIUM SERPL-MCNC: 9.8 MG/DL (ref 8.5–10.5)
CASTS URNS QL MICRO: ABNORMAL /LPF (ref 0–2)
CHLORIDE SERPL-SCNC: 102 MMOL/L (ref 96–112)
CO2 SERPL-SCNC: 22 MMOL/L (ref 20–33)
COLOR UR: YELLOW
CREAT SERPL-MCNC: 1.85 MG/DL (ref 0.5–1.4)
CREAT UR-MCNC: 26.2 MG/DL
CREAT UR-MCNC: 27.8 MG/DL
EPITHELIAL CELLS 1715: ABNORMAL /HPF (ref 0–5)
ERYTHROCYTE [DISTWIDTH] IN BLOOD BY AUTOMATED COUNT: 54.4 FL (ref 35.9–50)
FERRITIN SERPL-MCNC: 63.3 NG/ML (ref 10–291)
GFR SERPLBLD CREATININE-BSD FMLA CKD-EPI: 26 ML/MIN/1.73 M 2
GLUCOSE SERPL-MCNC: 118 MG/DL (ref 65–99)
GLUCOSE UR STRIP.AUTO-MCNC: NEGATIVE MG/DL
HCT VFR BLD AUTO: 38.8 % (ref 37–47)
HGB BLD-MCNC: 13 G/DL (ref 12–16)
IRON SATN MFR SERPL: 31 % (ref 15–55)
IRON SERPL-MCNC: 96 UG/DL (ref 40–170)
KETONES UR STRIP.AUTO-MCNC: NEGATIVE MG/DL
LEUKOCYTE ESTERASE UR QL STRIP.AUTO: ABNORMAL
MCH RBC QN AUTO: 30.6 PG (ref 27–33)
MCHC RBC AUTO-ENTMCNC: 33.5 G/DL (ref 32.2–35.5)
MCV RBC AUTO: 91.3 FL (ref 81.4–97.8)
MICRO URNS: ABNORMAL
MICROALBUMIN UR-MCNC: 2.5 MG/DL
MICROALBUMIN/CREAT UR: 90 MG/G (ref 0–30)
NITRITE UR QL STRIP.AUTO: NEGATIVE
PH UR STRIP.AUTO: 6 [PH] (ref 5–8)
PHOSPHATE SERPL-MCNC: 4.8 MG/DL (ref 2.5–4.5)
PLATELET # BLD AUTO: 301 K/UL (ref 164–446)
PMV BLD AUTO: 10.3 FL (ref 9–12.9)
POTASSIUM SERPL-SCNC: 4.5 MMOL/L (ref 3.6–5.5)
PROT UR QL STRIP: NEGATIVE MG/DL
PROT UR-MCNC: 8.7 MG/DL (ref 0–15)
PROT/CREAT UR: 332 MG/G (ref 10–107)
PTH-INTACT SERPL-MCNC: 204 PG/ML (ref 14–72)
RBC # BLD AUTO: 4.25 M/UL (ref 4.2–5.4)
RBC # URNS HPF: ABNORMAL /HPF (ref 0–2)
RBC UR QL AUTO: ABNORMAL
SODIUM SERPL-SCNC: 137 MMOL/L (ref 135–145)
SP GR UR STRIP.AUTO: 1.01
TIBC SERPL-MCNC: 313 UG/DL (ref 250–450)
UIBC SERPL-MCNC: 217 UG/DL (ref 110–370)
UROBILINOGEN UR STRIP.AUTO-MCNC: 0.2 EU/DL
WBC # BLD AUTO: 10.5 K/UL (ref 4.8–10.8)
WBC #/AREA URNS HPF: >100 /HPF

## 2025-06-18 PROCEDURE — 87186 SC STD MICRODIL/AGAR DIL: CPT

## 2025-06-18 PROCEDURE — 83970 ASSAY OF PARATHORMONE: CPT

## 2025-06-18 PROCEDURE — 82306 VITAMIN D 25 HYDROXY: CPT

## 2025-06-18 PROCEDURE — 82040 ASSAY OF SERUM ALBUMIN: CPT

## 2025-06-18 PROCEDURE — 84100 ASSAY OF PHOSPHORUS: CPT

## 2025-06-18 PROCEDURE — 82728 ASSAY OF FERRITIN: CPT

## 2025-06-18 PROCEDURE — 87077 CULTURE AEROBIC IDENTIFY: CPT

## 2025-06-18 PROCEDURE — 36415 COLL VENOUS BLD VENIPUNCTURE: CPT

## 2025-06-18 PROCEDURE — 83550 IRON BINDING TEST: CPT

## 2025-06-18 PROCEDURE — 85027 COMPLETE CBC AUTOMATED: CPT

## 2025-06-18 PROCEDURE — 81001 URINALYSIS AUTO W/SCOPE: CPT

## 2025-06-18 PROCEDURE — 80048 BASIC METABOLIC PNL TOTAL CA: CPT

## 2025-06-18 PROCEDURE — 84156 ASSAY OF PROTEIN URINE: CPT

## 2025-06-18 PROCEDURE — 87086 URINE CULTURE/COLONY COUNT: CPT

## 2025-06-18 PROCEDURE — 83540 ASSAY OF IRON: CPT

## 2025-06-18 PROCEDURE — 82043 UR ALBUMIN QUANTITATIVE: CPT

## 2025-06-18 PROCEDURE — 82570 ASSAY OF URINE CREATININE: CPT

## 2025-06-26 ENCOUNTER — OFFICE VISIT (OUTPATIENT)
Dept: NEPHROLOGY | Facility: MEDICAL CENTER | Age: 85
End: 2025-06-26
Payer: MEDICARE

## 2025-06-26 VITALS
WEIGHT: 143.9 LBS | OXYGEN SATURATION: 96 % | HEART RATE: 57 BPM | BODY MASS INDEX: 29.01 KG/M2 | DIASTOLIC BLOOD PRESSURE: 50 MMHG | HEIGHT: 59 IN | SYSTOLIC BLOOD PRESSURE: 120 MMHG | TEMPERATURE: 97.6 F

## 2025-06-26 DIAGNOSIS — I50.32 CHRONIC DIASTOLIC CONGESTIVE HEART FAILURE (HCC): ICD-10-CM

## 2025-06-26 DIAGNOSIS — J43.1 PANLOBULAR EMPHYSEMA (HCC): ICD-10-CM

## 2025-06-26 DIAGNOSIS — Z87.440 HISTORY OF UTI: ICD-10-CM

## 2025-06-26 DIAGNOSIS — I10 ESSENTIAL HYPERTENSION: ICD-10-CM

## 2025-06-26 DIAGNOSIS — N18.32 STAGE 3B CHRONIC KIDNEY DISEASE: Primary | ICD-10-CM

## 2025-06-26 DIAGNOSIS — N25.81 SECONDARY HYPERPARATHYROIDISM OF RENAL ORIGIN (HCC): ICD-10-CM

## 2025-06-26 PROCEDURE — 99214 OFFICE O/P EST MOD 30 MIN: CPT | Performed by: INTERNAL MEDICINE

## 2025-06-26 PROCEDURE — 3074F SYST BP LT 130 MM HG: CPT | Performed by: INTERNAL MEDICINE

## 2025-06-26 PROCEDURE — 3078F DIAST BP <80 MM HG: CPT | Performed by: INTERNAL MEDICINE

## 2025-06-26 PROCEDURE — G2211 COMPLEX E/M VISIT ADD ON: HCPCS | Performed by: INTERNAL MEDICINE

## 2025-06-26 ASSESSMENT — ENCOUNTER SYMPTOMS
COUGH: 1
ABDOMINAL PAIN: 0
SHORTNESS OF BREATH: 0
FEVER: 0

## 2025-06-26 NOTE — PATIENT INSTRUCTIONS
"If you would like to learn more about kidney failure and the options for patients with kidney failure, I recommend viewing a YouTube video by Dr. Julien Carpio about dialysis options.  You can find this easily by searching Google for \"YouTube, Dr. Julien Carpio, dialysis.\"  The video is about 10 minutes long.   "

## 2025-06-26 NOTE — PROGRESS NOTES
Chief Complaint   Patient presents with    Chronic Kidney Disease       CC: Follow-up CKD    HPI:  Valorie Sierra is a 84 y.o. female with HTN, pre-diabetes, CKD 3B, chronic diastolic heart failure who presents today for follow-up.     She didn't get any sleep last night due to anxiety.     Re: HTN, diagnosed 2014 around her CABG surgery. BP control over the years has been well controlled.  She doesn't check BP at home. Says it's usually good at doctor's visits.     Re: Chronic diastolic heart failure.  The patient had CABG surgery in September 2014. She takes lasix 80mg tab daily and she feels diuretic effect. LE edema is stable or maybe a little better.     Re: CKD. She had mild MELITA (Scr of ~2) when she had CABG in 2014, denies history of MELITA requiring dialysis. Denies NSAIDs. Denies bladder emptying troubles.       Past Medical History:   Diagnosis Date    Anemia     Anxiety     Arrhythmia     Arthritis     Blood transfusion, without reported diagnosis     CHF (congestive heart failure) (HCC)     Chronic airway obstruction, not elsewhere classified     Depression     Emphysema     GERD (gastroesophageal reflux disease)     Headache, classical migraine     Heart attack (HCC)     Heart murmur     Hyperlipidemia     Hypertension     IBD (inflammatory bowel disease)     Kidney disease     Ulcer     Unspecified cataract        Past Surgical History:   Procedure Laterality Date    MULTIPLE CORONARY ARTERY BYPASS ENDO VEIN HARVEST  09/01/2014    Performed by Stephen Nowak M.D. at SURGERY [facility] ORS    BLADDER NECK CONTRACTURE EXICISION  1970    GANGLION EXCISION  1969    left wrist    ABDOMINAL EXPLORATION      ABDOMINAL HYSTERECTOMY TOTAL      fibroids 41    APPENDECTOMY      DENTAL EXTRACTION(S)          Outpatient Encounter Medications as of 6/26/2025   Medication Sig Dispense Refill    furosemide (LASIX) 80 MG Tab Take 1 Tablet by mouth every day. 90 Tablet 3    amLODIPine (NORVASC) 10 MG Tab Take 1 Tablet  "by mouth every day. 100 Tablet 3    albuterol 108 (90 Base) MCG/ACT Aero Soln inhalation aerosol Inhale 2 Puffs every four hours as needed (Emergency Rescue use for - Shortness of Breath, or Wheezing.). 3 Each 3    atorvastatin (LIPITOR) 80 MG tablet Take 1 Tablet by mouth every evening. 100 Tablet 3    omeprazole (PRILOSEC) 20 MG delayed-release capsule Take 1 Capsule by mouth every day. 100 Capsule 3    metoprolol tartrate (LOPRESSOR) 25 MG Tab Take 1 Tablet by mouth 2 times a day. 200 Tablet 3    fluticasone-salmeterol (WIXELA INHUB) 250-50 MCG/ACT AEROSOL POWDER, BREATH ACTIVATED Inhale 1 Puff every 12 hours. 180 Each 3    benzonatate (TESSALON) 100 MG Cap Take 1 Capsule by mouth 3 times a day as needed for Cough. 30 Capsule 0    Probiotic Product (PROBIOTIC DAILY PO) Take  by mouth.      aspirin EC (ECOTRIN) 81 MG TBEC Take 1 Tab by mouth every day. 90 Tab 3     No facility-administered encounter medications on file as of 6/26/2025.        Allergies   Allergen Reactions    Iodine Itching    Oxycodone-Acetaminophen Hives    Sulfa Drugs Vomiting    Avocado     Nsaids      Pt states she was told to never take these because of her kidneys.             Review of Systems   Constitutional:  Negative for fever.   Respiratory:  Positive for cough. Negative for shortness of breath.    Cardiovascular:  Negative for chest pain.   Gastrointestinal:  Negative for abdominal pain.   Genitourinary:  Positive for frequency (especially at night). Negative for dysuria and hematuria.   All other systems reviewed and are negative.      /50 (BP Location: Right arm, Patient Position: Sitting, BP Cuff Size: Adult)   Pulse (!) 57   Temp 36.4 °C (97.6 °F) (Temporal)   Ht 1.499 m (4' 11\")   Wt 65.3 kg (143 lb 14.4 oz)   SpO2 96%   BMI 29.06 kg/m²     Physical Exam  Constitutional:       General: She is not in acute distress.     Appearance: She is overweight.   HENT:      Mouth/Throat:      Pharynx: No oropharyngeal exudate. "   Eyes:      General: No scleral icterus.  Neck:      Trachea: No tracheal deviation.   Cardiovascular:      Rate and Rhythm: Normal rate and regular rhythm.      Heart sounds: Normal heart sounds. No murmur heard.  Pulmonary:      Effort: Pulmonary effort is normal.      Breath sounds: Normal breath sounds. No stridor. No rales.   Abdominal:      General: Bowel sounds are normal.      Palpations: Abdomen is soft.      Tenderness: There is no abdominal tenderness.   Musculoskeletal:         General: Normal range of motion.      Cervical back: Neck supple.      Right lower leg: Edema (trace) present.      Left lower leg: Edema (trace) present.   Skin:     General: Skin is warm and dry.      Findings: No rash.   Neurological:      General: No focal deficit present.      Mental Status: She is alert and oriented to person, place, and time.   Psychiatric:         Mood and Affect: Mood and affect normal.         Behavior: Behavior normal.         Labs reviewed.    Recent Labs     04/30/25  0853 06/18/25  1124   ALBUMIN  --  4.6   HDL 73  --    TRIGLYCERIDE 66  --    SODIUM  --  137   POTASSIUM  --  4.5   CHLORIDE  --  102   CO2  --  22   BUN  --  51*   CREATININE  --  1.85*   PHOSPHORUS  --  4.8*       Lab Results   Component Value Date/Time    WBC 10.5 06/18/2025 11:24 AM    RBC 4.25 06/18/2025 11:24 AM    HEMOGLOBIN 13.0 06/18/2025 11:24 AM    HEMATOCRIT 38.8 06/18/2025 11:24 AM    MCV 91.3 06/18/2025 11:24 AM    MCH 30.6 06/18/2025 11:24 AM    MCHC 33.5 06/18/2025 11:24 AM    MPV 10.3 06/18/2025 11:24 AM             URINALYSIS:  Lab Results   Component Value Date/Time    COLORURINE Yellow 06/18/2025 1125    CLARITY Turbid (A) 06/18/2025 1125    SPECGRAVITY 1.009 06/18/2025 1125    PHURINE 6.0 06/18/2025 1125    KETONES Negative 06/18/2025 1125    PROTEINURIN Negative 06/18/2025 1125    BILIRUBINUR Negative 06/18/2025 1125    UROBILU 0.2 06/18/2025 1125    NITRITE Negative 06/18/2025 1125    LEUKESTERAS Large (A)  06/18/2025 1125    OCCULTBLOOD Small (A) 06/18/2025 1125       [facility] [place]  Lab Results   Component Value Date/Time    TOTPROTUR 8.7 06/18/2025 1125      Lab Results   Component Value Date/Time    CREATININEU 26.20 06/18/2025 1125                 Imaging reviewed  No orders to display         Assessment:  Valorie Sierra is a 84 y.o. female with HTN, pre-diabetes, CKD 3B, chronic diastolic heart failure who presents today for follow-up.    Plan:  1.  CKD stage 3b-4  -Creatinine and GFR are currently within stage IV CKD, has been fluctuating between stage IIIb and IV recently.  Hyperkalemia improved with cessation of lisinopril.  The patient's underlying CKD likely from hypertension and age-related kidney decline.  I recommend avoiding NSAIDs and other nephrotoxins.  I recommend a low-salt diet.  I explained the importance of blood pressure control to help slow CKD progression.  Unfortunately, patient had hyperkalemia with lisinopril, I recommend continuing to avoid lisinopril for now.    KFRE 2-Year: 3.6% at 6/18/2025 11:25 AM  Calculated from:  Serum Creatinine: 1.85 mg/dL at 6/18/2025 11:24 AM  Urine Albumin Creatinine Ratio: 90 mg/g at 6/18/2025 11:25 AM  Age: 84 years  Sex: Female at 6/18/2025 11:25 AM  Has CKD-3 to CKD-5: Yes    KFRE 5-Year: 10.9% at 6/18/2025 11:25 AM  Calculated from:  Serum Creatinine: 1.85 mg/dL at 6/18/2025 11:24 AM  Urine Albumin Creatinine Ratio: 90 mg/g at 6/18/2025 11:25 AM  Age: 84 years  Sex: Female at 6/18/2025 11:25 AM  Has CKD-3 to CKD-5: Yes      2. Prediabetes  -I explained to the importance of glycemic control to help slow CKD progression.  If she progresses to diabetes, I would recommend SGLT2 inhibitor such as Farxiga 5 mg p.o. daily.  I would recommend against metformin with GFR less than 30.    3. Essential hypertension  -Well-controlled.  Continue with amlodipine, Lasix, metoprolol.  Continue to avoid lisinopril given history of hyperkalemia.    4. Chronic diastolic congestive  heart failure (HCC)  -Patient appears euvolemic.  Continue Lasix 80 mg p.o. daily, metoprolol.    5.  Secondary hyperparathyroidism, due to CKD and vitamin D deficiency.    -Vitamin D level on the low end of normal in June 2025.  I recommend maintenance over-the-counter vitamin D 2000 units daily.    6.  Asymptomatic bacteriuria  - There remains no need for antibiotics as the patient has no symptoms of UTI.    Return to clinic in 3-4 months with preclinic labs    Vitaliy Simon MD  Nephrology  [facility]

## 2025-07-03 ENCOUNTER — TELEPHONE (OUTPATIENT)
Dept: HEALTH INFORMATION MANAGEMENT | Facility: OTHER | Age: 85
End: 2025-07-03
Payer: MEDICARE